# Patient Record
Sex: MALE | Race: ASIAN | NOT HISPANIC OR LATINO | Employment: FULL TIME | URBAN - METROPOLITAN AREA
[De-identification: names, ages, dates, MRNs, and addresses within clinical notes are randomized per-mention and may not be internally consistent; named-entity substitution may affect disease eponyms.]

---

## 2017-01-06 ENCOUNTER — TELEPHONE (OUTPATIENT)
Dept: FAMILY MEDICINE | Facility: CLINIC | Age: 27
End: 2017-01-06

## 2017-01-06 ENCOUNTER — RADIANT APPOINTMENT (OUTPATIENT)
Dept: GENERAL RADIOLOGY | Facility: CLINIC | Age: 27
End: 2017-01-06
Attending: PHYSICIAN ASSISTANT
Payer: COMMERCIAL

## 2017-01-06 ENCOUNTER — OFFICE VISIT (OUTPATIENT)
Dept: FAMILY MEDICINE | Facility: CLINIC | Age: 27
End: 2017-01-06
Payer: COMMERCIAL

## 2017-01-06 VITALS
WEIGHT: 167 LBS | HEART RATE: 106 BPM | TEMPERATURE: 97.4 F | OXYGEN SATURATION: 98 % | HEIGHT: 66 IN | SYSTOLIC BLOOD PRESSURE: 134 MMHG | DIASTOLIC BLOOD PRESSURE: 72 MMHG | BODY MASS INDEX: 26.84 KG/M2

## 2017-01-06 DIAGNOSIS — R05.9 COUGH: ICD-10-CM

## 2017-01-06 DIAGNOSIS — J45.41 MODERATE PERSISTENT ASTHMA WITH ACUTE EXACERBATION: Primary | ICD-10-CM

## 2017-01-06 DIAGNOSIS — F17.200 TOBACCO USE DISORDER: ICD-10-CM

## 2017-01-06 PROCEDURE — 99214 OFFICE O/P EST MOD 30 MIN: CPT | Performed by: PHYSICIAN ASSISTANT

## 2017-01-06 PROCEDURE — 71020 XR CHEST 2 VW: CPT

## 2017-01-06 PROCEDURE — 99406 BEHAV CHNG SMOKING 3-10 MIN: CPT | Performed by: PHYSICIAN ASSISTANT

## 2017-01-06 RX ORDER — PREDNISONE 20 MG/1
TABLET ORAL
Qty: 20 TABLET | Refills: 0 | Status: SHIPPED | OUTPATIENT
Start: 2017-01-06 | End: 2017-05-17

## 2017-01-06 RX ORDER — ALBUTEROL SULFATE 0.83 MG/ML
1 SOLUTION RESPIRATORY (INHALATION) EVERY 6 HOURS PRN
Qty: 60 VIAL | Refills: 1 | Status: SHIPPED | OUTPATIENT
Start: 2017-01-06 | End: 2018-05-03

## 2017-01-06 RX ORDER — ALBUTEROL SULFATE 90 UG/1
2 AEROSOL, METERED RESPIRATORY (INHALATION) EVERY 6 HOURS
Qty: 3 INHALER | Refills: 3 | Status: SHIPPED | OUTPATIENT
Start: 2017-01-06 | End: 2017-05-17

## 2017-01-06 NOTE — PATIENT INSTRUCTIONS
Based on your medical history and these are the current health maintenance or preventive care services that you are due for (some may have been done at this visit)  Health Maintenance Due   Topic Date Due     HPV IMMUNIZATION (1 of 3 - Male 3 Dose Series) 07/21/2003     INFLUENZA VACCINE (SYSTEM ASSIGNED)  09/01/2016     ASTHMA CONTROL TEST Q6 MOS (NO INBASKET)  02/03/2017       At [facility], we strive to deliver an exceptional experience to you, every time we see you.    If you receive a survey in the mail, please send us back your thoughts. We really do value your feedback.    Your care team's suggested websites for health information:  Www.[employer].org : Up to date and easily searchable information on multiple topics.  Www.medlineplus.gov : medication info, interactive tutorials, watch real surgeries online  Www.familydoctor.org : good info from the Academy of Family Physicians  Www.cdc.gov : public health info, travel advisories, epidemics (H1N1)  Www.aap.org : children's health info, normal development, vaccinations  Www.health.[facility].[place].us : [place] dept of health, public health issues in [place], N1N1    How to contact your care team:   Team [facility] [phone]         Pharmacy [phone]    Dr. Joseph, Tracey Estrada PA-C, Dr. Hernández, Kira GARRIDO CNP, Ariana Gonzalez PA-C, Dr. Blanton, and Ligia Gaona, CNP    Team RNs: Unique & Radha      Clinic hours  M-Th 7 am-7 pm   Fri 7 am-5 pm.   Urgent care M-F 11 am-9 pm,   Sat/Sun 9 am-5 pm.  Pharmacy M-Th 8 am-8 pm Fri 8 am-6 pm  Sat/Sun 9 am-5 pm.     All password changes, disabled accounts, or ID changes in [facility]/MyHealth will be done by our Access Services Department.    If you need help with your account or password, call: [phone]. Clinic staff no longer has the ability to change passwords.

## 2017-01-06 NOTE — PROGRESS NOTES
SUBJECTIVE:                                                    Jada Lion is a 26 year old male who presents to clinic today for the following health issues:      Hypertension Follow-up      Outpatient blood pressures are being checked at store.  Results are 144/90.    Low Salt Diet: no added salt     Asthma Follow-Up    Was ACT completed today?    Yes    ACT Total Scores 1/6/2017   ACT TOTAL SCORE -   ASTHMA ER VISITS -   ASTHMA HOSPITALIZATIONS -   ACT TOTAL SCORE (Goal Greater than or Equal to 20) 7   In the past 12 months, how many times did you visit the emergency room for your asthma without being admitted to the hospital? 2   In the past 12 months, how many times were you hospitalized overnight because of your asthma? 0         Amount of exercise or physical activity: 6-7 days/week for an average of 30-45 minutes    Problems taking medications regularly: No    Medication side effects: none  Diet: regular (no restrictions)    Was recently in [facility] ER for asthma exacerbation, not intubated, no CXR done.  They prescribed zpack and a 7 day prednisone burst.   He is still coughing and feels his breathing is tight despite being on day 6 of prednisone.  He ran out of his inhaler and has been doing 10 nebs/day.     He tried singulair at last appt for about a month and this did not help.  He states typically, his asthma feels well controlled with the advair, however this cold air and infection seems to have really flared it up.      He also c/o left side chest pain that comes and goes.  He is worried about his blood pressure, he checked it the other day and it was 144/92 mmHg.    BP Readings from Last 6 Encounters:   01/06/17 134/72   08/03/16 115/70   05/13/16 110/70   04/20/16 112/78   11/16/15 126/80   10/01/15 108/68           CHEST PAIN     Onset: 3 weeks     Description:   Location:  left side  Character: sharp  Radiation: na  Duration: 30 minutes and intermittent     Intensity: severe,  7/10    Progression of Symptoms:  worsening and intermittent    Accompanying Signs & Symptoms:  Shortness of breath: YES  Sweating: YES  Nausea/vomiting: no  Lightheadedness: YES  Palpitations: YES  Fever/Chills: YES- Chills  Cough: no  Heartburn: no    History:   Family history of heart disease not sure  Tobacco use: YES    Precipitating factors:   Worse with exertion: doesn't matter  Worse with deep breaths :  doesn't matter  Related to food: no    Alleviating factors:  na       Therapies Tried and outcome: na              Problem list and histories reviewed & adjusted, as indicated.  Additional history: as documented    Patient Active Problem List   Diagnosis     Moderate persistent asthma     Seasonal allergic rhinitis     Plantar fasciitis     Overweight (BMI 25.0-29.9)     CARDIOVASCULAR SCREENING; LDL GOAL LESS THAN 160     Moderate persistent asthma without complication     Hip pain, left     Tobacco use disorder     Past Surgical History   Procedure Laterality Date     Dental surgery  1-2015     wisdom teeth        Social History   Substance Use Topics     Smoking status: Current Every Day Smoker     Smokeless tobacco: Never Used     Alcohol Use: Yes     Family History   Problem Relation Age of Onset     DIABETES Mother      Hypertension Mother      Asthma Mother      Other Cancer Maternal Grandfather      Other Cancer Paternal Grandfather          Current Outpatient Prescriptions   Medication Sig Dispense Refill     predniSONE (DELTASONE) 20 MG tablet Take 3 tabs (60 mg) by mouth daily x 3 days, 2 tabs (40 mg) daily x 3 days, 1 tab (20 mg) daily x 3 days, then 1/2 tab (10 mg) x 3 days. 20 tablet 0     albuterol (ALBUTEROL) 108 (90 BASE) MCG/ACT Inhaler Inhale 2 puffs into the lungs every 6 hours 3 Inhaler 3     albuterol (2.5 MG/3ML) 0.083% neb solution Take 1 vial (2.5 mg) by nebulization every 6 hours as needed for shortness of breath / dyspnea or wheezing 60 vial 1     fluticasone-salmeterol  "(ADVAIR-HFA) 230-21 MCG/ACT inhaler Inhale 2 puffs into the lungs 2 times daily 1 Inhaler 3     order for DME 1 nebulizer machine with tubing 1 Device 0     [DISCONTINUED] albuterol (ALBUTEROL) 108 (90 BASE) MCG/ACT inhaler Inhale 2 puffs into the lungs every 6 hours 3 Inhaler 3     [DISCONTINUED] albuterol (2.5 MG/3ML) 0.083% nebulizer solution Take 1 vial (2.5 mg) by nebulization every 6 hours as needed for shortness of breath / dyspnea or wheezing 60 vial 3     BP Readings from Last 3 Encounters:   01/06/17 134/72   08/03/16 115/70   05/13/16 110/70    Wt Readings from Last 3 Encounters:   01/06/17 167 lb (75.751 kg)   08/03/16 164 lb (74.39 kg)   07/21/16 164 lb 12.8 oz (74.753 kg)                    ROS:  Constitutional, HEENT, cardiovascular, pulmonary, GI, , musculoskeletal, neuro, skin, endocrine and psych systems are negative, except as otherwise noted.    OBJECTIVE:                                                    /72 mmHg  Pulse 106  Temp(Src) 97.4  F (36.3  C) (Oral)  Ht 5' 6.14\" (1.68 m)  Wt 167 lb (75.751 kg)  BMI 26.84 kg/m2  SpO2 98%  Body mass index is 26.84 kg/(m^2).  GENERAL: healthy, alert and no distress  NECK: no adenopathy, no asymmetry, masses, or scars and thyroid normal to palpation  RESP: lungs clear to auscultation - no rales, rhonchi or wheezes  CV: regular rate and rhythm, normal S1 S2, no S3 or S4, no murmur, click or rub, no peripheral edema and peripheral pulses strong  ABDOMEN: soft, nontender, no hepatosplenomegaly, no masses and bowel sounds normal  MS: no gross musculoskeletal defects noted, no edema    Diagnostic Test Results:  XR CHEST 2 VW 1/6/2017 10:28 AM     HISTORY: Cough.     COMPARISON: None.                                                                       IMPRESSION: The lungs are clear. No focal pulmonary opacities. Heart  and mediastinum are unremarkable. No acute cardiopulmonary  abnormalities.     DOROTHY COX MD     ASSESSMENT/PLAN:            "                                             1. Moderate persistent asthma with acute exacerbation  Typically does well on advair and he is on the highest dose of this.  I suggest we leave this as is and extend his prednisone and taper off accordingly.  He has tried singulair in the past and didn't seem to do much, however it may be worthwhile to retrial that if symptoms persist.  Otherwise, I suggest f/u with pulmonary specialist if his asthma remains this poorly controlled.   Lungs were clear on exam today and oxygen levels stable, he did just use a neb before coming in.  Chest pain is likely due to chest wall pain and excessive coughing, I expect this to resolve as asthma improves.  I reassured him that today's blood pressure and others in the clinic have been fine, BP likely a bit higher due to acute asthma flare and steroid use.    - PULMONARY MEDICINE REFERRAL  - predniSONE (DELTASONE) 20 MG tablet; Take 3 tabs (60 mg) by mouth daily x 3 days, 2 tabs (40 mg) daily x 3 days, 1 tab (20 mg) daily x 3 days, then 1/2 tab (10 mg) x 3 days.  Dispense: 20 tablet; Refill: 0    2. Tobacco use disorder  He is still smoking 2 cig/day.  Discussed risks of smoking and strongly advised smoking cessation, especially given how severe his asthma is flaring currently.  Went over various options including patches, meds, and cessation programs available.  Reviewed general cessation measures, including behavioral modifications.   Approximately 9 minutes were spent counseling patient on smoking cessation.     Coding if there is a co-morbidity.  32383 (3-9 minutes) and 42647 (10+ minutes)           3. Cough  CXR clear.   - XR Chest 2 Views    FUTURE APPOINTMENTS:       - Follow-up over phone in 1 month to recheck ACT.  If ACT < 20, I suggest he f/u to discuss further (or get into pulmonary)    Ariana Gonzalez PA-C  [facility]

## 2017-01-06 NOTE — TELEPHONE ENCOUNTER
Pt was seen today for ASTHMA, score 7 on the ACT.   Call pt in 1 month to do a new ACT over the phone.  Pt left with ACT form.  An,CMA

## 2017-01-06 NOTE — NURSING NOTE
"Chief Complaint   Patient presents with     Asthma     Hypertension     Chest Pain       Initial /72 mmHg  Pulse 106  Temp(Src) 97.4  F (36.3  C) (Oral)  Ht 5' 6.14\" (1.68 m)  Wt 167 lb (75.751 kg)  BMI 26.84 kg/m2  SpO2 98% Estimated body mass index is 26.84 kg/(m^2) as calculated from the following:    Height as of this encounter: 5' 6.14\" (1.68 m).    Weight as of this encounter: 167 lb (75.751 kg).  BP completed using cuff size: regular  An,CMA      "

## 2017-01-07 ASSESSMENT — ASTHMA QUESTIONNAIRES: ACT_TOTALSCORE: 7

## 2017-02-06 NOTE — TELEPHONE ENCOUNTER
Called pt today and left a voice msg for pt call back.  PT needs ACT questionnaire  When pt calls back reach team Sonia = Kellie/Radha/Deanne.  If team is busy,write a msg in this telephone encounter and route to the team.  Estelle,CMA

## 2017-02-15 NOTE — TELEPHONE ENCOUNTER
Called pt today and left a voice msg for pt call back.   PT needs ACT questionnaire   When pt calls back reach team Sonia = Kellie/Radha/Deanne.   If team is busy,write a msg in this telephone encounter and route to the team.   3 outreaches done.  Route to the PCP  THELMA Mccabe

## 2017-02-16 NOTE — TELEPHONE ENCOUNTER
He's on [employer], can you please send a [employer] message as well and then close panel management call?  Thanks  Ariana

## 2017-03-13 ENCOUNTER — TELEPHONE (OUTPATIENT)
Dept: FAMILY MEDICINE | Facility: CLINIC | Age: 27
End: 2017-03-13

## 2017-03-15 NOTE — TELEPHONE ENCOUNTER
RN called pharmacy and they already filled medication for Pro-Air.  No further action is required at this time.      Jessica Le RN

## 2017-05-17 ENCOUNTER — OFFICE VISIT (OUTPATIENT)
Dept: FAMILY MEDICINE | Facility: CLINIC | Age: 27
End: 2017-05-17
Payer: COMMERCIAL

## 2017-05-17 VITALS
SYSTOLIC BLOOD PRESSURE: 110 MMHG | BODY MASS INDEX: 26.29 KG/M2 | DIASTOLIC BLOOD PRESSURE: 73 MMHG | TEMPERATURE: 97.9 F | HEIGHT: 66 IN | OXYGEN SATURATION: 100 % | HEART RATE: 57 BPM | RESPIRATION RATE: 15 BRPM | WEIGHT: 163.6 LBS

## 2017-05-17 DIAGNOSIS — S46.001A ROTATOR CUFF INJURY, RIGHT, INITIAL ENCOUNTER: Primary | ICD-10-CM

## 2017-05-17 DIAGNOSIS — F17.200 TOBACCO USE DISORDER: ICD-10-CM

## 2017-05-17 DIAGNOSIS — J45.40 MODERATE PERSISTENT ASTHMA WITHOUT COMPLICATION: ICD-10-CM

## 2017-05-17 PROCEDURE — 99214 OFFICE O/P EST MOD 30 MIN: CPT | Performed by: PHYSICIAN ASSISTANT

## 2017-05-17 RX ORDER — ALBUTEROL SULFATE 90 UG/1
2 AEROSOL, METERED RESPIRATORY (INHALATION) EVERY 6 HOURS
Qty: 2 INHALER | Refills: 3 | Status: SHIPPED | OUTPATIENT
Start: 2017-05-17 | End: 2018-03-05

## 2017-05-17 RX ORDER — DICLOFENAC SODIUM 75 MG/1
75 TABLET, DELAYED RELEASE ORAL 2 TIMES DAILY
Qty: 60 TABLET | Refills: 1 | Status: SHIPPED | OUTPATIENT
Start: 2017-05-17 | End: 2018-01-03

## 2017-05-17 ASSESSMENT — PAIN SCALES - GENERAL: PAINLEVEL: EXTREME PAIN (8)

## 2017-05-17 NOTE — MR AVS SNAPSHOT
After Visit Summary   5/17/2017    Jada Lion    MRN: [id number]           Patient Information     Date Of Birth          1990        Visit Information        Provider Department      5/17/2017 8:40 AM Ariana Gonzalez PA-C [facility]        Today's Diagnoses     Tobacco use disorder    -  1    Rotator cuff injury, right, initial encounter        Moderate persistent asthma without complication          Care Instructions    Diclofenac twice per day with food x 2 weeks and then as needed.   Heat or ice (whatever feels better).    ROM exercises  physical therapy.   No volleyball for 2-4 weeks (as tolerated).           Follow-ups after your visit        Additional Services     [facility] PT, HAND, AND CHIROPRACTIC REFERRAL       === This order will print in the [facility] Scheduling  Office ===    Physical therapy, hand therapy and chiropractic care are available through:    [facility]  [facility]    Call one easy number to schedule at any of the above locations:  [phone].    Your provider has referred you to Physical Therapy at [facility] or [facility]    Indication/Reason for Referral: Shoulder Pain  Onset of Illness:  2 weeks  Therapy Orders:  Evaluate and Treat  Special Programs:  None  Special Request:  None    Additional Comments for the therapist or chiropractor:      Please be aware that coverage of these services is subject to the terms and limitations of your health insurance plan.  Call member services at your health plan with any benefit or coverage questions.      Please bring the following to your appointment:    >>   Your personal calendar for scheduling future appointments  >>   Comfortable clothing                  Who to contact     If you have questions or need follow up information about today's clinic visit or your schedule please contact [facility] directly at [phone].  Normal or  "non-critical lab and imaging results will be communicated to you by MyChart, letter or phone within 4 business days after the clinic has received the results. If you do not hear from us within 7 days, please contact the clinic through [employer] or phone. If you have a critical or abnormal lab result, we will notify you by phone as soon as possible.  Submit refill requests through [employer] or call your pharmacy and they will forward the refill request to us. Please allow 3 business days for your refill to be completed.          Additional Information About Your Visit        [employer] Information     [employer] gives you secure access to your electronic health record. If you see a primary care provider, you can also send messages to your care team and make appointments. If you have questions, please call your primary care clinic.  If you do not have a primary care provider, please call [phone] and they will assist you.        Care EveryWhere ID     This is your Care EveryWhere ID. This could be used by other organizations to access your [facility] medical records  [id number]        Your Vitals Were     Pulse Temperature Respirations Height Pulse Oximetry BMI (Body Mass Index)    57 97.9  F (36.6  C) (Oral) 15 5' 6\" (1.676 m) 100% 26.41 kg/m2       Blood Pressure from Last 3 Encounters:   05/17/17 110/73   01/06/17 134/72   08/03/16 115/70    Weight from Last 3 Encounters:   05/17/17 163 lb 9.6 oz (74.2 kg)   01/06/17 167 lb (75.8 kg)   08/03/16 164 lb (74.4 kg)              We Performed the Following     [facility] PT, HAND, AND CHIROPRACTIC REFERRAL          Today's Medication Changes          These changes are accurate as of: 5/17/17  8:51 AM.  If you have any questions, ask your nurse or doctor.               Start taking these medicines.        Dose/Directions    diclofenac 75 MG EC tablet   Commonly known as:  VOLTAREN   Used for:  Rotator cuff injury, right, initial encounter        Dose:  75 mg   Take 1 tablet (75 mg) by " mouth 2 times daily X 2 weeks then as needed after that.  (with food)   Quantity:  60 tablet   Refills:  1       mometasone-formoterol 200-5 MCG/ACT oral inhaler   Commonly known as:  DULERA   Used for:  Moderate persistent asthma without complication        Dose:  2 puff   Inhale 2 puffs into the lungs 2 times daily   Quantity:  13 g   Refills:  0            Where to get your medicines      These medications were sent to [place] [address] [facility] [place] [facility], [place] - [address]  [address] [place] [address]     Phone:  [phone]     albuterol 108 (90 BASE) MCG/ACT Inhaler    diclofenac 75 MG EC tablet    mometasone-formoterol 200-5 MCG/ACT oral inhaler                Primary Care Provider Office Phone # Fax #    Ariana Gonzalez PA-C [phone]       [facility] [address] [place]  [facility] [address]        Thank you!     Thank you for choosing [facility]  for your care. Our goal is always to provide you with excellent care. Hearing back from our patients is one way we can continue to improve our services. Please take a few minutes to complete the written survey that you may receive in the mail after your visit with us. Thank you!             Your Updated Medication List - Protect others around you: Learn how to safely use, store and throw away your medicines at www.disposemymeds.org.          This list is accurate as of: 5/17/17  8:51 AM.  Always use your most recent med list.                   Brand Name Dispense Instructions for use    * albuterol (2.5 MG/3ML) 0.083% neb solution     60 vial    Take 1 vial (2.5 mg) by nebulization every 6 hours as needed for shortness of breath / dyspnea or wheezing       * albuterol 108 (90 BASE) MCG/ACT Inhaler    albuterol    2 Inhaler    Inhale 2 puffs into the lungs every 6 hours       diclofenac 75 MG EC tablet    VOLTAREN    60 tablet    Take 1 tablet (75 mg) by mouth 2 times daily X 2 weeks  then as needed after that.  (with food)       mometasone-formoterol 200-5 MCG/ACT oral inhaler    DULERA    13 g    Inhale 2 puffs into the lungs 2 times daily       order for DME     1 Device    1 nebulizer machine with tubing       predniSONE 20 MG tablet    DELTASONE    20 tablet    Take 3 tabs (60 mg) by mouth daily x 3 days, 2 tabs (40 mg) daily x 3 days, 1 tab (20 mg) daily x 3 days, then 1/2 tab (10 mg) x 3 days.       * Notice:  This list has 2 medication(s) that are the same as other medications prescribed for you. Read the directions carefully, and ask your doctor or other care provider to review them with you.

## 2017-05-17 NOTE — PATIENT INSTRUCTIONS
Diclofenac twice per day with food x 2 weeks and then as needed.   Heat or ice (whatever feels better).    ROM exercises  physical therapy.   No volleyball for 2-4 weeks (as tolerated).

## 2017-05-17 NOTE — NURSING NOTE
"Chief Complaint   Patient presents with     Pain     right shoulder times 1.5 mo       Initial /73 (BP Location: Left arm, Patient Position: Chair, Cuff Size: Adult Regular)  Pulse 57  Temp 97.9  F (36.6  C) (Oral)  Resp 15  Ht 5' 6\" (1.676 m)  Wt 163 lb 9.6 oz (74.2 kg)  SpO2 100%  BMI 26.41 kg/m2 Estimated body mass index is 26.41 kg/(m^2) as calculated from the following:    Height as of this encounter: 5' 6\" (1.676 m).    Weight as of this encounter: 163 lb 9.6 oz (74.2 kg).  Medication Reconciliation: complete   Christiana Swain CMA      "

## 2017-05-17 NOTE — PROGRESS NOTES
SUBJECTIVE:                                                    Jada Lion is a 26 year old male who presents to clinic today for the following health issues:      Joint Pain     Onset: 1.5 mo    Description:   Location: right shoulder  Character: Sharp    Intensity: moderate, severe    Progression of Symptoms: worse    Accompanying Signs & Symptoms:  Other symptoms: radiation of pain to arm , not sleeping well now    History:   Previous similar pain: no       Precipitating factors:   Trauma or overuse: no     Alleviating factors:  Improved by: nothing       Therapies Tried and outcome: otc pain meds (ibuprofen, naproxen, tylenol), no relief     Playing volleyball and it was sore after a game (no specific injury).  He plays volleyball daily, he is still playing however taking it easier than normal.    Pain is now constant and is rated 8/10 when sleeping, if he doesn't use it, not much pain.  Lifting his arm overhead worsens the pain.    Asthma;  When albuterol inhaler gets down around 30 clicks left, it is not as effective (doesn't feel like it is delivering the same medication).   Not on advair due to cost.   Uses albuterol daily.   Singulair tried in the past and did not help.   Advair disc has worked the best in the past.   Flovent tried in the past and not effective.            Problem list and histories reviewed & adjusted, as indicated.  Additional history: as documented    Patient Active Problem List   Diagnosis     Moderate persistent asthma     Seasonal allergic rhinitis     Plantar fasciitis     Overweight (BMI 25.0-29.9)     CARDIOVASCULAR SCREENING; LDL GOAL LESS THAN 160     Moderate persistent asthma without complication     Hip pain, left     Tobacco use disorder     Rotator cuff injury, right, initial encounter     Past Surgical History:   Procedure Laterality Date     DENTAL SURGERY  1-2015    wisdom teeth        Social History   Substance Use Topics     Smoking status: Current Every Day  "Smoker     Smokeless tobacco: Never Used     Alcohol use Yes     Family History   Problem Relation Age of Onset     DIABETES Mother      Hypertension Mother      Asthma Mother      Other Cancer Maternal Grandfather      Other Cancer Paternal Grandfather          Current Outpatient Prescriptions   Medication Sig Dispense Refill     mometasone-formoterol (DULERA) 200-5 MCG/ACT oral inhaler Inhale 2 puffs into the lungs 2 times daily 13 g 0     albuterol (ALBUTEROL) 108 (90 BASE) MCG/ACT Inhaler Inhale 2 puffs into the lungs every 6 hours 2 Inhaler 3     diclofenac (VOLTAREN) 75 MG EC tablet Take 1 tablet (75 mg) by mouth 2 times daily X 2 weeks then as needed after that.  (with food) 60 tablet 1     albuterol (2.5 MG/3ML) 0.083% neb solution Take 1 vial (2.5 mg) by nebulization every 6 hours as needed for shortness of breath / dyspnea or wheezing 60 vial 1     [DISCONTINUED] albuterol (ALBUTEROL) 108 (90 BASE) MCG/ACT Inhaler Inhale 2 puffs into the lungs every 6 hours 3 Inhaler 3     BP Readings from Last 3 Encounters:   05/17/17 110/73   01/06/17 134/72   08/03/16 115/70    Wt Readings from Last 3 Encounters:   05/17/17 163 lb 9.6 oz (74.2 kg)   01/06/17 167 lb (75.8 kg)   08/03/16 164 lb (74.4 kg)                    Reviewed and updated as needed this visit by clinical staff  Allergies  Meds       Reviewed and updated as needed this visit by Provider         ROS:  Constitutional, HEENT, cardiovascular, pulmonary, GI, , musculoskeletal, neuro, skin, endocrine and psych systems are negative, except as otherwise noted.    OBJECTIVE:                                                    /73 (BP Location: Left arm, Patient Position: Chair, Cuff Size: Adult Regular)  Pulse 57  Temp 97.9  F (36.6  C) (Oral)  Resp 15  Ht 5' 6\" (1.676 m)  Wt 163 lb 9.6 oz (74.2 kg)  SpO2 100%  BMI 26.41 kg/m2  Body mass index is 26.41 kg/(m^2).  GENERAL: healthy, alert and no distress  RESP: lungs clear to auscultation - no " rales, rhonchi or wheezes  CV: regular rate and rhythm, normal S1 S2, no S3 or S4, no murmur, click or rub, no peripheral edema and peripheral pulses strong    Inspection: No obvious deformity, asymmetry, muscle atrophy or abnormal motion noted.   Palpation:  No pain over AC or SC joint, acromion and subacromial space, pain noted over bicipital groove and greater/less tubercles, no pain over scapular spine and adjacent musculature, cervical spine.    External/Internal rotation strength:  Limited on right due to pain  Abduction/Adduction strength: full but pain noted with abduction past 90 degrees  Biceps/Triceps strength: full  Neck examination: normal  ROM/crepitus? normal  Capillary refills less than 2 seconds and radial pulses normal bilaterally.   Sensation intact bilateral fingers, hands and arms.  X-ray: not indicated.           ASSESSMENT/PLAN:                                                            1. Rotator cuff injury, right, initial encounter  Discussed the etiology and pathophysiology of this condition.  Recommend we start with conservative treatment, mainly daily stretching/physical therapy exercises, NSAID's and ice.  Discussed importance of maintaining joint ROM to prevent frozen shoulder.  If no improvement with physical therapy, I suggest he follow-up for a recheck and we may consider imaging at that time.     - diclofenac (VOLTAREN) 75 MG EC tablet; Take 1 tablet (75 mg) by mouth 2 times daily X 2 weeks then as needed after that.  (with food)  Dispense: 60 tablet; Refill: 1    2. Tobacco use disorder  He has been working on cutting back and is down to 2 cigs/day.  Continue with smoking cessation efforts.     3. Moderate persistent asthma without complication    - mometasone-formoterol (DULERA) 200-5 MCG/ACT oral inhaler; Inhale 2 puffs into the lungs 2 times daily  Dispense: 13 g; Refill: 0    Dulera not covered better than advair.  Will try qvar next.     - albuterol (ALBUTEROL) 108 (90 BASE)  MCG/ACT Inhaler; Inhale 2 puffs into the lungs every 6 hours  Dispense: 2 Inhaler; Refill: 3  - [facility]    FUTURE APPOINTMENTS:       - Follow-up visit in 1 month with ACT over phone.     Ariana Gonzalez PA-C  [facility]

## 2017-05-18 ENCOUNTER — TELEPHONE (OUTPATIENT)
Dept: FAMILY MEDICINE | Facility: CLINIC | Age: 27
End: 2017-05-18

## 2017-05-18 ASSESSMENT — ASTHMA QUESTIONNAIRES: ACT_TOTALSCORE: 19

## 2017-05-18 NOTE — TELEPHONE ENCOUNTER
Reason for Call:  Other returning call    Detailed comments: Was told to call and let the Dr know that Quvar is cheaper than Doljones chol go ahead and abram the Quvar.     Phone Number Patient can be reached at: Other phone number:  [phone]    Best Time: Any    Can we leave a detailed message on this number? YES    Call taken on 5/18/2017 at 9:18 AM by Lisa Burton

## 2017-07-12 ENCOUNTER — TELEPHONE (OUTPATIENT)
Dept: FAMILY MEDICINE | Facility: CLINIC | Age: 27
End: 2017-07-12

## 2017-07-12 NOTE — TELEPHONE ENCOUNTER
Ariana Gonzalez PA-C  [facility] Team Sonia Qi                     Please mail ACT and then call a few days later to check ACT.  If < 20, please schedule f/u office visit for asthma.   Ariana Gonzalez PA-C

## 2017-07-12 NOTE — LETTER
July 12, 2017      [address]ada Lion  [address] [place] [address]            Dear Jada,      At [facility] we care about your health and are committed to providing quality patient care. It has come to our attention that you are due for an Asthma Control Test.     This screening tool helps us to assess how well your asthma is controlled. Good asthma control leads to less asthma symptoms and greater health. If your asthma is not in good control (score less than 20) it is recommended you be seen by your provider for medication and lifestyle adjustments    We have enclosed an  Asthma Control Test. Please complete the enclosed asthma control test even if you are feeling well. You can mail it back to our clinic or we will call you and verbally complete it.    Thank you for your time and we hope this letter finds you well!      Sincerely,    Your Team at [facility]

## 2017-08-21 NOTE — TELEPHONE ENCOUNTER
Spoke with the pt and schedule follow up for Asthma this Wednesday 7:40 with Lisa.  THELMA Mccabe (AA)

## 2017-08-23 ENCOUNTER — OFFICE VISIT (OUTPATIENT)
Dept: FAMILY MEDICINE | Facility: CLINIC | Age: 27
End: 2017-08-23
Payer: COMMERCIAL

## 2017-08-23 VITALS
BODY MASS INDEX: 27.66 KG/M2 | OXYGEN SATURATION: 100 % | TEMPERATURE: 98.7 F | DIASTOLIC BLOOD PRESSURE: 73 MMHG | WEIGHT: 171.4 LBS | HEART RATE: 67 BPM | SYSTOLIC BLOOD PRESSURE: 129 MMHG

## 2017-08-23 DIAGNOSIS — F17.200 TOBACCO USE DISORDER: ICD-10-CM

## 2017-08-23 DIAGNOSIS — J45.40 MODERATE PERSISTENT ASTHMA WITHOUT COMPLICATION: Primary | ICD-10-CM

## 2017-08-23 DIAGNOSIS — H04.123 DRY EYES: ICD-10-CM

## 2017-08-23 DIAGNOSIS — K13.0 CHEILITIS: ICD-10-CM

## 2017-08-23 DIAGNOSIS — B07.8 COMMON WART: ICD-10-CM

## 2017-08-23 PROCEDURE — 17110 DESTRUCTION B9 LES UP TO 14: CPT | Performed by: PHYSICIAN ASSISTANT

## 2017-08-23 PROCEDURE — 99214 OFFICE O/P EST MOD 30 MIN: CPT | Mod: 25 | Performed by: PHYSICIAN ASSISTANT

## 2017-08-23 PROCEDURE — 99406 BEHAV CHNG SMOKING 3-10 MIN: CPT | Performed by: PHYSICIAN ASSISTANT

## 2017-08-23 NOTE — MR AVS SNAPSHOT
After Visit Summary   8/23/2017    Jada Lion    MRN: [id number]           Patient Information     Date Of Birth          1990        Visit Information        Provider Department      8/23/2017 7:40 AM Ariana Gonzalez PA-C [facility]        Today's Diagnoses     Moderate persistent asthma without complication    -  1    Tobacco use disorder        Dry eyes        Cheilitis        Common wart          Care Instructions    Based on your medical history and these are the current health maintenance or preventive care services that you are due for (some may have been done at this visit)  Health Maintenance Due   Topic Date Due     ASTHMA ACTION PLAN Q1 YR  07/21/2017         At [facility], we strive to deliver an exceptional experience to you, every time we see you.    If you receive a survey in the mail, please send us back your thoughts. We really do value your feedback.    Your care team's suggested websites for health information:  Www.[email or web address] : Up to date and easily searchable information on multiple topics.  Www.medlineplus.gov : medication info, interactive tutorials, watch real surgeries online  Www.familydoctor.org : good info from the Academy of Family Physicians  Www.cdc.gov : public health info, travel advisories, epidemics (H1N1)  Www.aap.org : children's health info, normal development, vaccinations  Www.health.[facility].[place].us : [place] dept of health, public health issues in [place], N1N1    How to contact your care team:   Team Sonia/Spirit [phone]         Pharmacy [phone]    Dr. Joseph, Tracey Estrada PA-C, Kira Silva APRN CNP, Ariana Gonzalez PA-C, Dr. Blanton, and RADHIKA Rodriguez CNP    Team RNs: Radha & Marizol      Clinic hours  M-Th 7 am-7 pm   Fri 7 am-5 pm.   Urgent care M-F 11 am-9 pm,   Sat/Sun 9 am-5 pm.  Pharmacy M-Th 8 am-8 pm Fri 8 am-6 pm  Sat/Sun 9 am-5 pm.     All password changes, disabled  accounts, or ID changes in XenoOne/MyHealth will be done by our Access Services Department.    If you need help with your account or password, call: [phone]. Clinic staff no longer has the ability to change passwords.       I suggest 1% hydrocortisone ointment to your lips twice per day x 10-12 days to help calm down the irritation.                 Follow-ups after your visit        Who to contact     If you have questions or need follow up information about today's clinic visit or your schedule please contact [facility] directly at [phone].  Normal or non-critical lab and imaging results will be communicated to you by [employer], letter or phone within 4 business days after the clinic has received the results. If you do not hear from us within 7 days, please contact the clinic through [employer] or phone. If you have a critical or abnormal lab result, we will notify you by phone as soon as possible.  Submit refill requests through [employer]One or call your pharmacy and they will forward the refill request to us. Please allow 3 business days for your refill to be completed.          Additional Information About Your Visit        XenoOne Information     [employer]One gives you secure access to your electronic health record. If you see a primary care provider, you can also send messages to your care team and make appointments. If you have questions, please call your primary care clinic.  If you do not have a primary care provider, please call [phone] and they will assist you.        Care EveryWhere ID     This is your Care EveryWhere ID. This could be used by other organizations to access your [facility] medical records  [id number]        Your Vitals Were     Pulse Temperature Pulse Oximetry BMI (Body Mass Index)          67 98.7  F (37.1  C) (Tympanic) 100% 27.66 kg/m2         Blood Pressure from Last 3 Encounters:   08/23/17 129/73   05/17/17 110/73   01/06/17 134/72    Weight from Last 3  Encounters:   08/23/17 171 lb 6.4 oz (77.7 kg)   05/17/17 163 lb 9.6 oz (74.2 kg)   01/06/17 167 lb (75.8 kg)              We Performed the Following     DESTRUCT BENIGN LESION, UP TO 14     SMOKING CESSATION COUNSELING 3-10 MIN          Today's Medication Changes          These changes are accurate as of: 8/23/17  8:58 AM.  If you have any questions, ask your nurse or doctor.               Start taking these medicines.        Dose/Directions    fluticasone-salmeterol 250-50 MCG/DOSE diskus inhaler   Commonly known as:  ADVAIR   Used for:  Moderate persistent asthma without complication   Started by:  Ariana Gonzalez PA-C        Dose:  1 puff   Inhale 1 puff into the lungs every 12 hours   Quantity:  60 Inhaler   Refills:  3       glycerin-hypromellose- 0.2-0.2-1 % Soln ophthalmic solution   Commonly known as:  ARTIFICIAL TEARS   Used for:  Dry eyes   Started by:  Ariana Gonzalez PA-C        Dose:  1 drop   Place 1 drop into both eyes 4 times daily   Quantity:  1 Bottle   Refills:  1       nicotine 10 MG Inhaler   Commonly known as:  NICOTROL   Used for:  Tobacco use disorder   Started by:  Ariana Gonzalez PA-C        Dose:  5 cartridge   Inhale 5 cartridge into the lungs daily as needed for smoking cessation   Quantity:  168 each   Refills:  1         Stop taking these medicines if you haven't already. Please contact your care team if you have questions.     beclomethasone 40 MCG/ACT Inhaler   Commonly known as:  QVAR   Stopped by:  Ariana Gonzalez PA-C                Where to get your medicines      These medications were sent to [place] [id number] [place] - [address]  [address] [address]     Phone:  [phone]     fluticasone-salmeterol 250-50 MCG/DOSE diskus inhaler    glycerin-hypromellose- 0.2-0.2-1 % Soln ophthalmic solution    nicotine 10 MG Inhaler                Primary Care Provider Office Phone # Fax #    Ariana  Cate Gonzalez PA-C [phone]       [address]  [facility] [address]        Equal Access to Services     RACHEL NICOLE : Hadii bebeto nance stephenieo Solebronali, waaxda luqadaha, qaybta kaalmada adeegstephda, clover rodriguez laBardanyell rees. So [facility] [phone].    ATENCIÓN: Si habla español, tiene a antunez disposición servicios gratuitos de asistencia lingüística. Llame al [phone].    We comply with applicable federal civil rights laws and [place] laws. We do not discriminate on the basis of race, color, national origin, age, disability sex, sexual orientation or gender identity.            Thank you!     Thank you for choosing [facility]  for your care. Our goal is always to provide you with excellent care. Hearing back from our patients is one way we can continue to improve our services. Please take a few minutes to complete the written survey that you may receive in the mail after your visit with us. Thank you!             Your Updated Medication List - Protect others around you: Learn how to safely use, store and throw away your medicines at www.disposemymeds.org.          This list is accurate as of: 8/23/17  8:58 AM.  Always use your most recent med list.                   Brand Name Dispense Instructions for use Diagnosis    * albuterol (2.5 MG/3ML) 0.083% neb solution     60 vial    Take 1 vial (2.5 mg) by nebulization every 6 hours as needed for shortness of breath / dyspnea or wheezing        * albuterol 108 (90 BASE) MCG/ACT Inhaler    albuterol    2 Inhaler    Inhale 2 puffs into the lungs every 6 hours    Moderate persistent asthma without complication       diclofenac 75 MG EC tablet    VOLTAREN    60 tablet    Take 1 tablet (75 mg) by mouth 2 times daily X 2 weeks then as needed after that.  (with food)    Rotator cuff injury, right, initial encounter       fluticasone-salmeterol 250-50 MCG/DOSE diskus inhaler    ADVAIR    60 Inhaler    Inhale 1 puff into the  lungs every 12 hours    Moderate persistent asthma without complication       glycerin-hypromellose- 0.2-0.2-1 % Soln ophthalmic solution    ARTIFICIAL TEARS    1 Bottle    Place 1 drop into both eyes 4 times daily    Dry eyes       nicotine 10 MG Inhaler    NICOTROL    168 each    Inhale 5 cartridge into the lungs daily as needed for smoking cessation    Tobacco use disorder       * Notice:  This list has 2 medication(s) that are the same as other medications prescribed for you. Read the directions carefully, and ask your doctor or other care provider to review them with you.

## 2017-08-23 NOTE — PATIENT INSTRUCTIONS
Based on your medical history and these are the current health maintenance or preventive care services that you are due for (some may have been done at this visit)  Health Maintenance Due   Topic Date Due     ASTHMA ACTION PLAN Q1 YR  07/21/2017         At [facility], we strive to deliver an exceptional experience to you, every time we see you.    If you receive a survey in the mail, please send us back your thoughts. We really do value your feedback.    Your care team's suggested websites for health information:  Www.[employer].org : Up to date and easily searchable information on multiple topics.  Www.medlineplus.gov : medication info, interactive tutorials, watch real surgeries online  Www.familydoctor.org : good info from the Academy of Family Physicians  Www.cdc.gov : public health info, travel advisories, epidemics (H1N1)  Www.aap.org : children's health info, normal development, vaccinations  Www.health.[facility].[place].us : [place] dept of health, public health issues in [place], N1N1    How to contact your care team:   Team Sonia/Spirit [phone]         Pharmacy [phone]    Dr. Joseph, Tracey Estrada PA-C, Dr. Hernández, Kira GARRIDO CNP, Ariana Gonzalez PA-C, Dr. Blanton, and RADHIKA Rodriguez CNP    Team RNs: Radha Fontana      Clinic hours  M-Th 7 am-7 pm   Fri 7 am-5 pm.   Urgent care M-F 11 am-9 pm,   Sat/Sun 9 am-5 pm.  Pharmacy M-Th 8 am-8 pm Fri 8 am-6 pm  Sat/Sun 9 am-5 pm.     All password changes, disabled accounts, or ID changes in I[employer]iction/MyHealth will be done by our Access Services Department.    If you need help with your account or password, call: [phone]. Clinic staff no longer has the ability to change passwords.       I suggest 1% hydrocortisone ointment to your lips twice per day x 10-12 days to help calm down the irritation.

## 2017-08-23 NOTE — NURSING NOTE
"Chief Complaint   Patient presents with     Asthma     Follow up     Wart     removal     Eye Problem     dry eye       Initial /73 (BP Location: Left arm, Patient Position: Sitting, Cuff Size: Adult Regular)  Pulse 67  Temp 98.7  F (37.1  C) (Tympanic)  Wt 171 lb 6.4 oz (77.7 kg)  SpO2 100%  BMI 27.66 kg/m2 Estimated body mass index is 27.66 kg/(m^2) as calculated from the following:    Height as of 5/17/17: 5' 6\" (1.676 m).    Weight as of this encounter: 171 lb 6.4 oz (77.7 kg).  Medication Reconciliation: complete   Alyx Worrell MA    "

## 2017-08-23 NOTE — PROGRESS NOTES
SUBJECTIVE:   Jada Lion is a 27 year old male who presents to clinic today for the following health issues:      Asthma Follow-Up    Was ACT completed today?  No      Respiratory symptoms:   Cough: No   Wheezing: Yes-     Shortness of breath: Yes-      Use of short- acting(rescue) inhaler: Yes    Taking controlled (daily) meds as prescribed: Yes    ER/UC visits or hospital admissions since last visit: none     Recent asthma triggers that patient is dealing with: Not sure but this morning it happened       Tried Qvar in May for about 2 months, didn't seem to help, he was still wheezing while on it.    Going through a lot of albuterol recently, especially while playing sports.  Uses albuterol daily.   Not on advair due to cost.   Singulair tried in the past and did not help.   Advair disc has worked the best in the past.   Flovent tried in the past and not effective.    Most recently, Qvar does not seem to have helped.        Dry eyes, has been waking with dry eyes.  Has tried clear eyes however this causes a burning sensation.  Has tried many OTC eye drops without relief.      Still with warts on hand, but improved.  Left thumb and right 2nd digit.      Tobacco use:  Smokes 2 cigs/day.  Smokes mainly while at work.      C/o irritation on his lips for the past 2 months.  He has tried using vaseline/carmex on a regular basis however this started after he did not use it one day and was licking his lips all day.  Since that time it has continued to be irritated and burning at times.       Problem list and histories reviewed & adjusted, as indicated.  Additional history: as documented    Patient Active Problem List   Diagnosis     Moderate persistent asthma     Seasonal allergic rhinitis     Plantar fasciitis     Overweight (BMI 25.0-29.9)     CARDIOVASCULAR SCREENING; LDL GOAL LESS THAN 160     Moderate persistent asthma without complication     Hip pain, left     Tobacco use disorder     Rotator cuff injury,  right, initial encounter     Cheilitis     Dry eyes     Past Surgical History:   Procedure Laterality Date     DENTAL SURGERY  1-2015    wisdom teeth        Social History   Substance Use Topics     Smoking status: Current Every Day Smoker     Smokeless tobacco: Never Used     Alcohol use Yes     Family History   Problem Relation Age of Onset     DIABETES Mother      Hypertension Mother      Asthma Mother      Other Cancer Maternal Grandfather      Other Cancer Paternal Grandfather          Current Outpatient Prescriptions   Medication Sig Dispense Refill     albuterol (ALBUTEROL) 108 (90 BASE) MCG/ACT Inhaler Inhale 2 puffs into the lungs every 6 hours 2 Inhaler 3     diclofenac (VOLTAREN) 75 MG EC tablet Take 1 tablet (75 mg) by mouth 2 times daily X 2 weeks then as needed after that.  (with food) 60 tablet 1     albuterol (2.5 MG/3ML) 0.083% neb solution Take 1 vial (2.5 mg) by nebulization every 6 hours as needed for shortness of breath / dyspnea or wheezing 60 vial 1     BP Readings from Last 3 Encounters:   08/23/17 129/73   05/17/17 110/73   01/06/17 134/72    Wt Readings from Last 3 Encounters:   08/23/17 171 lb 6.4 oz (77.7 kg)   05/17/17 163 lb 9.6 oz (74.2 kg)   01/06/17 167 lb (75.8 kg)                        Reviewed and updated as needed this visit by clinical staffTobacco  Allergies       Reviewed and updated as needed this visit by Provider         ROS:  Constitutional, HEENT, cardiovascular, pulmonary, gi and gu systems are negative, except as otherwise noted.      OBJECTIVE:   /73 (BP Location: Left arm, Patient Position: Sitting, Cuff Size: Adult Regular)  Pulse 67  Temp 98.7  F (37.1  C) (Tympanic)  Wt 171 lb 6.4 oz (77.7 kg)  SpO2 100%  BMI 27.66 kg/m2  Body mass index is 27.66 kg/(m^2).  GENERAL: healthy, alert and no distress  RESP: lungs clear to auscultation - no rales, rhonchi or wheezes  CV: regular rate and rhythm, normal S1 S2, no S3 or S4, no murmur, click or rub, no  peripheral edema and peripheral pulses strong  Lips:  A few areas of hyperpigmentation and flaking noted.  MS: no gross musculoskeletal defects noted, no edema  Wart:  Wart noted on left base of 1st digit and knuckle of right 2nd digit (2 warts).     Diagnostic Test Results:  none     ASSESSMENT/PLAN:         1. Moderate persistent asthma without complication  Uncontrolled.  Has tried many meds in the past, not currently on a controller medication.  Most recently, Qvar has not worked well.  Will restart advair and may need to start a PA for coverage or check for 's coupons.      Discussed importance of smoking cessation.   - fluticasone-salmeterol (ADVAIR) 250-50 MCG/DOSE diskus inhaler; Inhale 1 puff into the lungs every 12 hours  Dispense: 60 Inhaler; Refill: 3      (F17.200) Tobacco use disorder  Comment: Discussed risks of smoking and strongly advised smoking cessation.  Went over various options including patches, meds, and cessation programs available.  Reviewed general cessation measures, including behavioral modifications.   Approximately 5 minutes were spent counseling patient on smoking cessation.     Coding if there is a co-morbidity.  81746 (3-9 minutes) and 29024 (10+ minutes)         Plan: nicotine (NICOTROL) 10 MG Inhaler          Discussed importance of smoking cessation given his h/o asthma (especially with how uncontrolled it is)    (H04.123) Dry eyes  Comment: Will try artificial tears 4x/day  Plan: glycerin-hypromellose- (ARTIFICIAL         TEARS) 0.2-0.2-1 % SOLN ophthalmic solution            (K13.0) Cheilitis  Comment: I suggest OTC hydrocortisone ointment BID x 12 days.  F/u if no improvement (will recheck in 1 month).   Plan:     (B07.8) Common wart  Comment: 078.10U Wart  Plan: DESTRUC BENIGN LESION, UP TO 14 LESIONS  (86293)        Liquid nitrogen was applied for 10-12 seconds x 3 to the warts and the expected blistering or scabbing reaction explained. Do not pick at the  area. Patient reminded to expect hypopigmented scars from the procedure.  The etiology of common warts were discussed.  Jada Lion  will continue to use the over-the-counter medications such as Compound W under occlusion on a nightly  basis. Warm soapy water soaks and sanding also recommended.  The patient is to return every two weeks until all warts have resolved.      Plan: DESTRUCT BENIGN LESION, UP TO 14                FUTURE APPOINTMENTS:       - Follow-up visit in 1 month for an asthma recheck and will recheck lips at that time    Ariana Gonzalez PA-C  [facility]

## 2017-08-24 ASSESSMENT — ASTHMA QUESTIONNAIRES: ACT_TOTALSCORE: 8

## 2018-01-03 ENCOUNTER — OFFICE VISIT (OUTPATIENT)
Dept: FAMILY MEDICINE | Facility: CLINIC | Age: 28
End: 2018-01-03
Payer: COMMERCIAL

## 2018-01-03 VITALS
BODY MASS INDEX: 27.61 KG/M2 | HEIGHT: 66 IN | WEIGHT: 171.8 LBS | DIASTOLIC BLOOD PRESSURE: 64 MMHG | SYSTOLIC BLOOD PRESSURE: 102 MMHG | HEART RATE: 60 BPM

## 2018-01-03 DIAGNOSIS — Z72.820 SLEEP DEPRIVATION: ICD-10-CM

## 2018-01-03 DIAGNOSIS — J45.40 MODERATE PERSISTENT ASTHMA WITHOUT COMPLICATION: ICD-10-CM

## 2018-01-03 DIAGNOSIS — M22.2X2 PATELLOFEMORAL ARTHRALGIA OF BOTH KNEES: Primary | ICD-10-CM

## 2018-01-03 DIAGNOSIS — M22.2X1 PATELLOFEMORAL ARTHRALGIA OF BOTH KNEES: Primary | ICD-10-CM

## 2018-01-03 PROCEDURE — 99214 OFFICE O/P EST MOD 30 MIN: CPT | Performed by: PHYSICIAN ASSISTANT

## 2018-01-03 RX ORDER — DICLOFENAC SODIUM 75 MG/1
75 TABLET, DELAYED RELEASE ORAL 2 TIMES DAILY PRN
Qty: 60 TABLET | Refills: 1 | Status: SHIPPED | OUTPATIENT
Start: 2018-01-03 | End: 2018-03-05

## 2018-01-03 NOTE — LETTER
My Asthma Action Plan  Name: Jada Lion   YOB: 1990  Date: 1/3/2018   My doctor: Ariana Gonzalez PA-C   My clinic: [facility]        My Control Medicine: Fluticasone + salmeterol (Advair) -  Diskus 250/50 mcg 1 puff twice per day  My Rescue Medicine: Albuterol 90 mcg 1-2 puffs every 4-6 hours as needed     My Asthma Severity: moderate persistent  Avoid your asthma triggers: smoke, upper respiratory infections, pollens and animal dander              GREEN ZONE   Good Control    I feel good    No cough or wheeze    Can work, sleep and play without asthma symptoms       Take your asthma control medicine every day.     1. If exercise triggers your asthma, take your rescue medication    15 minutes before exercise or sports, and    During exercise if you have asthma symptoms  2. Spacer to use with inhaler: If you have a spacer, make sure to use it with your inhaler             YELLOW ZONE Getting Worse  I have ANY of these:    I do not feel good    Cough or wheeze    Chest feels tight    Wake up at night   1. Keep taking your Green Zone medications  2. Start taking your rescue medicine:    every 20 minutes for up to 1 hour. Then every 4 hours for 24-48 hours.  3. If you stay in the Yellow Zone for more than 12-24 hours, contact your doctor.  4. If you do not return to the Green Zone in 12-24 hours or you get worse, start taking your oral steroid medicine if prescribed by your provider.           RED ZONE Medical Alert - Get Help  I have ANY of these:    I feel awful    Medicine is not helping    Breathing getting harder    Trouble walking or talking    Nose opens wide to breathe       1. Take your rescue medicine NOW  2. If your provider has prescribed an oral steroid medicine, start taking it NOW  3. Call your doctor NOW  4. If you are still in the Red Zone after 20 minutes and you have not reached your doctor:    Take your rescue medicine again and    Call 911 or go to the  emergency room right away    See your regular doctor within 2 weeks of an Emergency Room or Urgent Care visit for follow-up treatment.        Electronically signed by: Ariana Gonzalez, January 3, 2018    Annual Reminders:  Meet with Asthma Educator,  Flu Shot in the Fall, consider Pneumonia Vaccination for patients with asthma (aged 19 and older).    Pharmacy: [facility] [address] [place], [place] - [address]                    Asthma Triggers  How To Control Things That Make Your Asthma Worse    Triggers are things that make your asthma worse.  Look at the list below to help you find your triggers and what you can do about them.  You can help prevent asthma flare-ups by staying away from your triggers.      Trigger                                                          What you can do   Cigarette Smoke  Tobacco smoke can make asthma worse. Do not allow smoking in your home, car or around you.  Be sure no one smokes at a child s day care or school.  If you smoke, ask your health care provider for ways to help you quit.  Ask family members to quit too.  Ask your health care provider for a referral to Quit Plan to help you quit smoking, or call 9-734-458-PLAN.     Colds, Flu, Bronchitis  These are common triggers of asthma. Wash your hands often.  Don t touch your eyes, nose or mouth.  Get a flu shot every year.     Dust Mites  These are tiny bugs that live in cloth or carpet. They are too small to see. Wash sheets and blankets in hot water every week.   Encase pillows and mattress in dust mite proof covers.  Avoid having carpet if you can. If you have carpet, vacuum weekly.   Use a dust mask and HEPA vacuum.   Pollen and Outdoor Mold  Some people are allergic to trees, grass, or weed pollen, or molds. Try to keep your windows closed.  Limit time out doors when pollen count is high.   Ask you health care provider about taking medicine during allergy season.     Animal Dander  Some people are  allergic to skin flakes, urine or saliva from pets with fur or feathers. Keep pets with fur or feathers out of your home.    If you can t keep the pet outdoors, then keep the pet out of your bedroom.  Keep the bedroom door closed.  Keep pets off cloth furniture and away from stuffed toys.     Mice, Rats, and Cockroaches  Some people are allergic to the waste from these pests.   Cover food and garbage.  Clean up spills and food crumbs.  Store grease in the refrigerator.   Keep food out of the bedroom.   Indoor Mold  This can be a trigger if your home has high moisture. Fix leaking faucets, pipes, or other sources of water.   Clean moldy surfaces.  Dehumidify basement if it is damp and smelly.   Smoke, Strong Odors, and Sprays  These can reduce air quality. Stay away from strong odors and sprays, such as perfume, powder, hair spray, paints, smoke incense, paint, cleaning products, candles and new carpet.   Exercise or Sports  Some people with asthma have this trigger. Be active!  Ask your doctor about taking medicine before sports or exercise to prevent symptoms.    Warm up for 5-10 minutes before and after sports or exercise.     Other Triggers of Asthma  Cold air:  Cover your nose and mouth with a scarf.  Sometimes laughing or crying can be a trigger.  Some medicines and food can trigger asthma.

## 2018-01-03 NOTE — MR AVS SNAPSHOT
After Visit Summary   1/3/2018    Jada Lion    MRN: [id number]           Patient Information     Date Of Birth          1990        Visit Information        Provider Department      1/3/2018 8:00 AM Ariana Gonzalez PA-C [facility]        Today's Diagnoses     Patellofemoral arthralgia of both knees    -  1      Care Instructions    You need to get more sleep at night, goal is 6 hours per night.    For your knees, make sure you rest them as your pain is caused by overuse.  You can try ice, elevation and a compression sleeve to help with the pain          Follow-ups after your visit        Who to contact     Normal or non-critical lab and imaging results will be communicated to you by DraftMixhart, letter or phone within 4 business days after the clinic has received the results. If you do not hear from us within 7 days, please contact the clinic through DraftMixhart or phone. If you have a critical or abnormal lab result, we will notify you by phone as soon as possible.  Submit refill requests through MadeClose or call your pharmacy and they will forward the refill request to us. Please allow 3 business days for your refill to be completed.          If you need to speak with a  for additional information , please call: [phone]           Additional Information About Your Visit        MadeClose Information     MadeClose gives you secure access to your electronic health record. If you see a primary care provider, you can also send messages to your care team and make appointments. If you have questions, please call your primary care clinic.  If you do not have a primary care provider, please call [phone] and they will assist you.        Care EveryWhere ID     This is your Care EveryWhere ID. This could be used by other organizations to access your [place] medical records  [id number]        Your Vitals Were     Pulse Height BMI (Body Mass Index)             60 5'  "6\" (1.676 m) 27.73 kg/m2          Blood Pressure from Last 3 Encounters:   01/03/18 102/64   08/23/17 129/73   05/17/17 110/73    Weight from Last 3 Encounters:   01/03/18 171 lb 12.8 oz (77.9 kg)   08/23/17 171 lb 6.4 oz (77.7 kg)   05/17/17 163 lb 9.6 oz (74.2 kg)              Today, you had the following     No orders found for display         Today's Medication Changes          These changes are accurate as of: 1/3/18  8:35 AM.  If you have any questions, ask your nurse or doctor.               Start taking these medicines.        Dose/Directions    diclofenac 75 MG EC tablet   Commonly known as:  VOLTAREN   Used for:  Patellofemoral arthralgia of both knees   Started by:  rAiana Gonzalez PA-C        Dose:  75 mg   Take 1 tablet (75 mg) by mouth 2 times daily as needed for moderate pain   Quantity:  60 tablet   Refills:  1            Where to get your medicines      These medications were sent to [id number] - [place], [place] - [address]  [address] [place] [facility] [address]     Phone:  [phone]     diclofenac 75 MG EC tablet                Primary Care Provider Office Phone # Fax #    Ariana Gonzalez PA-C [phone] [address] [place] [address]        Equal Access to Services     [facility] COREY AH: Hadii bebeto nance hadasho Soomaali, waaxda luqadaha, qaybta kaalmada adeegyada, clover rees. So [facility] [phone].    ATENCIÓN: Si habla jaycobañol, tiene a antunez disposición servicios gratuitos de asistencia lingüística. Llame al [phone].    We comply with applicable federal civil rights laws and [place] laws. We do not discriminate on the basis of race, color, national origin, age, disability, sex, sexual orientation, or gender identity.            Thank you!     Thank you for choosing [facility]  for your care. Our goal is always to provide you with excellent care. Hearing back from our patients is " one way we can continue to improve our services. Please take a few minutes to complete the written survey that you may receive in the mail after your visit with us. Thank you!             Your Updated Medication List - Protect others around you: Learn how to safely use, store and throw away your medicines at www.disposemymeds.org.          This list is accurate as of: 1/3/18  8:35 AM.  Always use your most recent med list.                   Brand Name Dispense Instructions for use Diagnosis    * albuterol (2.5 MG/3ML) 0.083% neb solution     60 vial    Take 1 vial (2.5 mg) by nebulization every 6 hours as needed for shortness of breath / dyspnea or wheezing        * albuterol 108 (90 BASE) MCG/ACT Inhaler    PROAIR HFA    2 Inhaler    Inhale 2 puffs into the lungs every 6 hours    Moderate persistent asthma without complication       diclofenac 75 MG EC tablet    VOLTAREN    60 tablet    Take 1 tablet (75 mg) by mouth 2 times daily as needed for moderate pain    Patellofemoral arthralgia of both knees       fluticasone-salmeterol 250-50 MCG/DOSE diskus inhaler    ADVAIR    60 Inhaler    Inhale 1 puff into the lungs every 12 hours    Moderate persistent asthma without complication       glycerin-hypromellose- 0.2-0.2-1 % Soln ophthalmic solution    ARTIFICIAL TEARS    1 Bottle    Place 1 drop into both eyes 4 times daily    Dry eyes       nicotine 10 MG Inhaler    NICOTROL    168 each    Inhale 5 cartridge into the lungs daily as needed for smoking cessation    Tobacco use disorder       * Notice:  This list has 2 medication(s) that are the same as other medications prescribed for you. Read the directions carefully, and ask your doctor or other care provider to review them with you.

## 2018-01-03 NOTE — LETTER
[facility]  [address] [facility] [address]  Phone: [phone]    January 3, 2018        Jada Lion  [address] [place] [facility] [address]          To whom it may concern:    RE: Jada Lion    Patient was seen and treated today at our clinic and missed work.    Please contact me for questions or concerns.      Sincerely,        Ariana Gonzalze PA-C

## 2018-01-03 NOTE — PATIENT INSTRUCTIONS
You need to get more sleep at night, goal is 6 hours per night.    For your knees, make sure you rest them as your pain is caused by overuse.  You can try ice, elevation and a compression sleeve to help with the pain

## 2018-01-03 NOTE — PROGRESS NOTES
"  SUBJECTIVE:   Jaad Lion is a 27 year old male who presents to clinic today for the following health issues:    Joint Pain    Onset:pain in both knees for 1 week. Right knee is very bothersome today.     Description:   Location: left knee and right knee  Character: Sharp, Dull ache and Stabbing, burning, \"feels like bones are touching\"    Intensity: moderate    Progression of Symptoms: worse    Accompanying Signs & Symptoms:  Other symptoms: radiation of pain to hamstring     History:   Previous similar pain: no       Precipitating factors:   Trauma or overuse: YES- maybe from playing volleyball     Alleviating factors:  Improved by: tylenol and ibuprofen without relief    Right knee feels unsteady at times, left knee just pain.     Therapies Tried and outcome: ice for 1-2 days - made it worse, tylenol and ibuprofen     No pain while playing volleyball (plays nearly everyday).  Gets 3-4 hours of sleep at night.  Drinks an energy drink once per day.  Works and goes to school and has a daughter.  His schedule is packed.     Currently in school at [employer], needs a form filled out stating he is current on immunizations (does not actually need to be signed by provider, just needs immunization history)           Asthma Follow-Up    Was ACT completed today?    Yes    ACT Total Scores 1/3/2018   ACT TOTAL SCORE -   ASTHMA ER VISITS -   ASTHMA HOSPITALIZATIONS -   ACT TOTAL SCORE (Goal Greater than or Equal to 20) 13   In the past 12 months, how many times did you visit the emergency room for your asthma without being admitted to the hospital? 0   In the past 12 months, how many times were you hospitalized overnight because of your asthma? 0       Recent asthma triggers that patient is dealing with: None              Problem list and histories reviewed & adjusted, as indicated.  Additional history: as documented    Patient Active Problem List   Diagnosis     Moderate persistent asthma     Seasonal " allergic rhinitis     Plantar fasciitis     Overweight (BMI 25.0-29.9)     CARDIOVASCULAR SCREENING; LDL GOAL LESS THAN 160     Moderate persistent asthma without complication     Hip pain, left     Tobacco use disorder     Rotator cuff injury, right, initial encounter     Cheilitis     Dry eyes     Past Surgical History:   Procedure Laterality Date     DENTAL SURGERY  1-2015    wisdom teeth        Social History   Substance Use Topics     Smoking status: Current Every Day Smoker     Smokeless tobacco: Never Used     Alcohol use Yes     Family History   Problem Relation Age of Onset     DIABETES Mother      Hypertension Mother      Asthma Mother      Other Cancer Maternal Grandfather      Other Cancer Paternal Grandfather          Current Outpatient Prescriptions   Medication Sig Dispense Refill     diclofenac (VOLTAREN) 75 MG EC tablet Take 1 tablet (75 mg) by mouth 2 times daily as needed for moderate pain 60 tablet 1     fluticasone-salmeterol (ADVAIR) 250-50 MCG/DOSE diskus inhaler Inhale 1 puff into the lungs every 12 hours 60 Inhaler 3     nicotine (NICOTROL) 10 MG Inhaler Inhale 5 cartridge into the lungs daily as needed for smoking cessation 168 each 1     glycerin-hypromellose- (ARTIFICIAL TEARS) 0.2-0.2-1 % SOLN ophthalmic solution Place 1 drop into both eyes 4 times daily 1 Bottle 1     albuterol (ALBUTEROL) 108 (90 BASE) MCG/ACT Inhaler Inhale 2 puffs into the lungs every 6 hours 2 Inhaler 3     albuterol (2.5 MG/3ML) 0.083% neb solution Take 1 vial (2.5 mg) by nebulization every 6 hours as needed for shortness of breath / dyspnea or wheezing 60 vial 1     [DISCONTINUED] diclofenac (VOLTAREN) 75 MG EC tablet Take 1 tablet (75 mg) by mouth 2 times daily X 2 weeks then as needed after that.  (with food) 60 tablet 1     BP Readings from Last 3 Encounters:   01/03/18 102/64   08/23/17 129/73   05/17/17 110/73    Wt Readings from Last 3 Encounters:   01/03/18 171 lb 12.8 oz (77.9 kg)   08/23/17 171 lb  "6.4 oz (77.7 kg)   05/17/17 163 lb 9.6 oz (74.2 kg)                        Reviewed and updated as needed this visit by clinical staff     Reviewed and updated as needed this visit by Provider         ROS:  Constitutional, HEENT, cardiovascular, pulmonary, gi and gu systems are negative, except as otherwise noted.      OBJECTIVE:   /64  Pulse 60  Ht 5' 6\" (1.676 m)  Wt 171 lb 12.8 oz (77.9 kg)  BMI 27.73 kg/m2  Body mass index is 27.73 kg/(m^2).  GENERAL: healthy, alert and no distress  MS: no gross musculoskeletal defects noted, no edema  SKIN: no suspicious lesions or rashes  NEURO: Normal strength and tone, mentation intact and speech normal    GAIT: NORMAL  Dorsalis Pedis pulses intact bilaterally.  MUSCULOSKELETAL:  Bilateral KNEEs   Inspection: AP/lateral alignment normal, No effusion  Tender: prepatellar bursa  Non-tender: lateral joint line, medial joint line, popliteal region  Active Range of Motion: full flexion, full extension  Strength: full  Special tests: normal Valgus stress test, negative Lachman's test  Duck walk: able to perform.     No warmth noted over bilateral knees.             Diagnostic Test Results:  none     ASSESSMENT/PLAN:       1. Patellofemoral arthralgia of both knees  Patellofemoral Pain Syndrome    The patient was advised to apply ice on knee for 20-30 minutes every 3-4 hours for first 2-3 days or until pain goes away.  Elevate knee by placing a pillow underneath  Start anti-inflammatory medication, see epic for orders.  Do rehabilitation exercises, Patient education materials given during examination today, patellofemoral pain syndrome.    Physical therapy/imaging if symptoms not improving    Patient was instructed to f/u if symptoms worsen or fail to improve as anticipated.          - diclofenac (VOLTAREN) 75 MG EC tablet; Take 1 tablet (75 mg) by mouth 2 times daily as needed for moderate pain  Dispense: 60 tablet; Refill: 1    2. Moderate persistent asthma without " complication  AAP done today.  No change in treatment at this time, he feels his breathing is fine.     3. Sleep deprivation  Discussed the importance of adequate sleep and need for rest (especially with his knee pains).  Currently getting 3-4 hours of sleep at night,  I suggest an initial goal of 6 hours per night.     Immunization history provided for Kristian    FUTURE APPOINTMENTS:       - Follow-up visit if symptoms worsen or fail to improve as anticipated.     Ariana Gonzalez PA-C  [facility]

## 2018-01-04 ASSESSMENT — ASTHMA QUESTIONNAIRES: ACT_TOTALSCORE: 13

## 2018-01-18 DIAGNOSIS — J45.40 MODERATE PERSISTENT ASTHMA WITHOUT COMPLICATION: ICD-10-CM

## 2018-01-19 NOTE — TELEPHONE ENCOUNTER
"Requested Prescriptions   Pending Prescriptions Disp Refills     PROAIR  (90 BASE) MCG/ACT inhaler [Pharmacy Med Name: PROAIR HFA 90 MCG INHALER] 17 Inhaler 3    Last Written Prescription Date:  05/07/2017 #2 x 3  Last filled 01/04/2018  Last Office Visit with [facility], [facility] or [facility] prescribing provider:  01/03/2018 RANGEL Gonzalez   Future Office Visit:  none    Sig: INHALE 2 PUFFS BY MOUTH INTO THE LUNGS EVERY 6 HOURS    Asthma Maintenance Inhalers - Anticholinergics Failed    1/18/2018  6:02 PM       Failed - Asthma control test score is 20 or greater in last 6 months    Please review ACT score.   ACT Total Scores 5/17/2017 8/23/2017 1/3/2018   ACT TOTAL SCORE - - -   ASTHMA ER VISITS - - -   ASTHMA HOSPITALIZATIONS - - -   ACT TOTAL SCORE (Goal Greater than or Equal to 20) 19 8 13   In the past 12 months, how many times did you visit the emergency room for your asthma without being admitted to the hospital? 2 1 0   In the past 12 months, how many times were you hospitalized overnight because of your asthma? 0 0 0              Passed - Patient is age 12 years or older       Passed - Recent (6 mo) or future visit with authorizing provider's specialty    Patient had office visit in the last 6 months or has a visit in the next 30 days with authorizing provider.  See \"Patient Info\" tab in inbasket, or \"Choose Columns\" in Meds & Orders section of the refill encounter.              "

## 2018-01-22 ENCOUNTER — OFFICE VISIT (OUTPATIENT)
Dept: FAMILY MEDICINE | Facility: CLINIC | Age: 28
End: 2018-01-22
Payer: COMMERCIAL

## 2018-01-22 VITALS
RESPIRATION RATE: 18 BRPM | HEIGHT: 66 IN | BODY MASS INDEX: 27.64 KG/M2 | OXYGEN SATURATION: 97 % | WEIGHT: 172 LBS | HEART RATE: 87 BPM | DIASTOLIC BLOOD PRESSURE: 68 MMHG | SYSTOLIC BLOOD PRESSURE: 126 MMHG | TEMPERATURE: 98.6 F

## 2018-01-22 DIAGNOSIS — J45.40 MODERATE PERSISTENT ASTHMA, UNSPECIFIED WHETHER COMPLICATED: ICD-10-CM

## 2018-01-22 DIAGNOSIS — J06.9 UPPER RESPIRATORY TRACT INFECTION, UNSPECIFIED TYPE: Primary | ICD-10-CM

## 2018-01-22 DIAGNOSIS — J10.1 INFLUENZA A: ICD-10-CM

## 2018-01-22 LAB
FLUAV+FLUBV AG SPEC QL: NEGATIVE
FLUAV+FLUBV AG SPEC QL: NEGATIVE
SPECIMEN SOURCE: NORMAL

## 2018-01-22 PROCEDURE — 99213 OFFICE O/P EST LOW 20 MIN: CPT | Performed by: PHYSICIAN ASSISTANT

## 2018-01-22 PROCEDURE — 87804 INFLUENZA ASSAY W/OPTIC: CPT | Performed by: PHYSICIAN ASSISTANT

## 2018-01-22 RX ORDER — OSELTAMIVIR PHOSPHATE 75 MG/1
75 CAPSULE ORAL 2 TIMES DAILY
Qty: 10 CAPSULE | Refills: 0 | Status: SHIPPED | OUTPATIENT
Start: 2018-01-22 | End: 2018-03-05

## 2018-01-22 RX ORDER — ALBUTEROL SULFATE 90 UG/1
2 AEROSOL, METERED RESPIRATORY (INHALATION) EVERY 4 HOURS PRN
Qty: 1 INHALER | Refills: 0 | Status: SHIPPED | OUTPATIENT
Start: 2018-01-22 | End: 2018-05-03

## 2018-01-22 ASSESSMENT — PAIN SCALES - GENERAL: PAINLEVEL: MODERATE PAIN (4)

## 2018-01-22 NOTE — MR AVS SNAPSHOT
After Visit Summary   1/22/2018    Jada Lion    MRN: [id number]           Patient Information     Date Of Birth          1990        Visit Information        Provider Department      1/22/2018 3:00 PM Nate Feng PA [facility]        Today's Diagnoses     Upper respiratory tract infection, unspecified type    -  1    Influenza A          Care Instructions      Influenza (Adult)  Influenza, also called the flu, is a viral illness that affects the air passages of the lungs. It differs from the common cold. It is highly contagious. It may be spread through the air by coughing and sneezing or by direct contact (touching the sick person and then touching your own eyes, nose or mouth).  Illness starts 1-3 days after exposure and lasts for 1-2 weeks. Antibiotics are usually not needed unless a complication appears (ear or sinus infection or pneumonia).  Symptoms may be mild or severe and can include extreme tiredness (wanting to stay in bed all day), chills, fevers, muscle aching, soreness with eye movement, headache, and a dry, hacking cough.  Home Care:  Avoid exposure to cigarette smoke (yours or others ).  Tylenol or ibuprofen (Advil) will help fever, muscle aching, and headache. To avoid risk of liver injury, aspirin should not be used in children and teenagers under 18 with this illness.  Nausea and loss of appetite are common. A light diet is recommended. Avoid dehydration by drinking 6-8 glasses of fluids per day (water, sport drinks like Gatorade, soft drinks without caffeine, juices, tea, soup, etc.). Extra fluids will also help loosen secretions in the nose and lungs.  Over-the-counter cold medicines will not shorten the duration of the illness but may be helpful for the following symptoms: cough (Robitussin DM); sore throat (Chloraseptic lozenges or spray); nasal and sinus congestion (Actifed or Sudafed). [NOTE: Do not use decongestants if you have  high blood pressure.]  Stay home until your fever has been gone for at least 24 hours (without the use of fever-reducing medications such as ibuprofen).  Follow Up  with your doctor or as directed by our staff if you are not improving over the next week.  Note: If you are age 65 or older, or if you have chronic asthma or COPD, we recommend a pneumococcal vaccination every five years. All adults should receive a yearly influenza vaccination every autumn. Ask your doctor about this.  Get Prompt Medical Attention  if any of the following occur:  Cough with lots of colored sputum (mucus) or blood in your sputum  Chest pain, shortness of breath, wheezing, or difficulty breathing  Severe headache, face, neck or ear pain  New rash  Fever of 100.4 F (38 C) oral or higher, not better with fever medication  Confusion, behavior change or seizure  Severe weakness or dizziness    0341-3762 [place] [address]. All rights reserved. This information is not intended as a substitute for professional medical care. Always follow your healthcare professional's instructions.                Follow-ups after your visit        Follow-up notes from your care team     Return if symptoms worsen or fail to improve.      Who to contact     If you have questions or need follow up information about today's clinic visit or your schedule please contact [facility] directly at [phone].  Normal or non-critical lab and imaging results will be communicated to you by MyChart, letter or phone within 4 business days after the clinic has received the results. If you do not hear from us within 7 days, please contact the clinic through MyChart or phone. If you have a critical or abnormal lab result, we will notify you by phone as soon as possible.  Submit refill requests through [employer] or call your pharmacy and they will forward the refill request to us. Please allow 3 business days for your  "refill to be completed.          Additional Information About Your Visit        [employer] Information     [employer] gives you secure access to your electronic health record. If you see a primary care provider, you can also send messages to your care team and make appointments. If you have questions, please call your primary care clinic.  If you do not have a primary care provider, please call [phone] and they will assist you.        Care EveryWhere ID     This is your Care EveryWhere ID. This could be used by other organizations to access your [facility] medical records  [id number]        Your Vitals Were     Pulse Temperature Respirations Height Pulse Oximetry BMI (Body Mass Index)    87 98.6  F (37  C) (Oral) 18 5' 5.75\" (1.67 m) 97% 27.97 kg/m2       Blood Pressure from Last 3 Encounters:   01/22/18 126/68   01/03/18 102/64   08/23/17 129/73    Weight from Last 3 Encounters:   01/22/18 172 lb (78 kg)   01/03/18 171 lb 12.8 oz (77.9 kg)   08/23/17 171 lb 6.4 oz (77.7 kg)              We Performed the Following     Influenza A/B antigen          Today's Medication Changes          These changes are accurate as of: 1/22/18  4:08 PM.  If you have any questions, ask your nurse or doctor.               Start taking these medicines.        Dose/Directions    oseltamivir 75 MG capsule   Commonly known as:  TAMIFLU   Used for:  Influenza A   Started by:  Nate Feng PA        Dose:  75 mg   Take 1 capsule (75 mg) by mouth 2 times daily   Quantity:  10 capsule   Refills:  0            Where to get your medicines      These medications were sent to [id number] [facility] [place] - [address]  [address] [place] [address]     Phone:  [phone]     oseltamivir 75 MG capsule                Primary Care Provider Office Phone # Fax #    Ariana Gonzalez PA-C [phone] [address] [place] [address]        Equal Access to Services     " RACHEL NICOLE : Hadii aad ku gustabo Solebronali, waaxda luqadaha, qaybta kaalmada adeegdarron, clover lindain hayaashukri curzish rodriguez dougie . So [facility] [phone].    ATENCIÓN: Si habla desire, tiene a antunez disposición servicios gratuitos de asistencia lingüística. Llame al [phone].    We comply with applicable federal civil rights laws and [place] laws. We do not discriminate on the basis of race, color, national origin, age, disability, sex, sexual orientation, or gender identity.            Thank you!     Thank you for choosing [facility]  for your care. Our goal is always to provide you with excellent care. Hearing back from our patients is one way we can continue to improve our services. Please take a few minutes to complete the written survey that you may receive in the mail after your visit with us. Thank you!             Your Updated Medication List - Protect others around you: Learn how to safely use, store and throw away your medicines at www.disposemymeds.org.          This list is accurate as of: 1/22/18  4:08 PM.  Always use your most recent med list.                   Brand Name Dispense Instructions for use Diagnosis    * albuterol (2.5 MG/3ML) 0.083% neb solution     60 vial    Take 1 vial (2.5 mg) by nebulization every 6 hours as needed for shortness of breath / dyspnea or wheezing        * albuterol 108 (90 BASE) MCG/ACT Inhaler    PROAIR HFA    2 Inhaler    Inhale 2 puffs into the lungs every 6 hours    Moderate persistent asthma without complication       diclofenac 75 MG EC tablet    VOLTAREN    60 tablet    Take 1 tablet (75 mg) by mouth 2 times daily as needed for moderate pain    Patellofemoral arthralgia of both knees       fluticasone-salmeterol 250-50 MCG/DOSE diskus inhaler    ADVAIR    60 Inhaler    Inhale 1 puff into the lungs every 12 hours    Moderate persistent asthma without complication       glycerin-hypromellose- 0.2-0.2-1 % Soln ophthalmic solution     ARTIFICIAL TEARS    1 Bottle    Place 1 drop into both eyes 4 times daily    Dry eyes       nicotine 10 MG Inhaler    NICOTROL    168 each    Inhale 5 cartridge into the lungs daily as needed for smoking cessation    Tobacco use disorder       oseltamivir 75 MG capsule    TAMIFLU    10 capsule    Take 1 capsule (75 mg) by mouth 2 times daily    Influenza A       * Notice:  This list has 2 medication(s) that are the same as other medications prescribed for you. Read the directions carefully, and ask your doctor or other care provider to review them with you.

## 2018-01-22 NOTE — PROGRESS NOTES
SUBJECTIVE:   Jada Lion is a 27 year old male who presents to clinic today for the following health issues:      Acute Illness   Acute illness concerns: cough, heartburn, headache and diarrhea   Onset: today    Fever: no     Chills/Sweats: YES    Headache (location?): YES    Sinus Pressure:no    Conjunctivitis:  no    Ear Pain: YES: both    Rhinorrhea: no     Congestion: no     Sore Throat: YES     Cough: YES-non-productive    Wheeze: YES    Decreased Appetite: yes    Nausea: YES    Vomiting: YES- once    Diarrhea:  YES    Dysuria/Freq.: no     Fatigue/Achiness: YES    Sick/Strep Exposure: YES     Therapies Tried and outcome: Tylenol and Ibuprofen    Patient didn't tolerate singualr very well in the past      No Known Allergies    Patient Active Problem List   Diagnosis     Moderate persistent asthma     Seasonal allergic rhinitis     Plantar fasciitis     Overweight (BMI 25.0-29.9)     CARDIOVASCULAR SCREENING; LDL GOAL LESS THAN 160     Moderate persistent asthma without complication     Hip pain, left     Tobacco use disorder     Rotator cuff injury, right, initial encounter     Cheilitis     Dry eyes       History reviewed. No pertinent past medical history.      Current Outpatient Prescriptions on File Prior to Visit:  diclofenac (VOLTAREN) 75 MG EC tablet Take 1 tablet (75 mg) by mouth 2 times daily as needed for moderate pain   fluticasone-salmeterol (ADVAIR) 250-50 MCG/DOSE diskus inhaler Inhale 1 puff into the lungs every 12 hours   nicotine (NICOTROL) 10 MG Inhaler Inhale 5 cartridge into the lungs daily as needed for smoking cessation   glycerin-hypromellose- (ARTIFICIAL TEARS) 0.2-0.2-1 % SOLN ophthalmic solution Place 1 drop into both eyes 4 times daily   albuterol (ALBUTEROL) 108 (90 BASE) MCG/ACT Inhaler Inhale 2 puffs into the lungs every 6 hours   albuterol (2.5 MG/3ML) 0.083% neb solution Take 1 vial (2.5 mg) by nebulization every 6 hours as needed for shortness of breath / dyspnea  "or wheezing     No current facility-administered medications on file prior to visit.     Social History   Substance Use Topics     Smoking status: Current Every Day Smoker     Smokeless tobacco: Never Used     Alcohol use Yes       Family History   Problem Relation Age of Onset     DIABETES Mother      Hypertension Mother      Asthma Mother      Other Cancer Maternal Grandfather      Other Cancer Paternal Grandfather        ROS:  Consitutional: As above  ENT: As above  Respiratory: As above    OBJECTIVE:  /68 (BP Location: Right arm, Patient Position: Chair, Cuff Size: Adult Large)  Pulse 87  Temp 98.6  F (37  C) (Oral)  Resp 18  Ht 5' 5.75\" (1.67 m)  Wt 172 lb (78 kg)  SpO2 97%  BMI 27.97 kg/m2  GENERAL APPEARANCE: healthy, alert and no distress  EYES: conjunctiva clear  HENT:    TMs w/o erythema, effusion or bulging.  Nose and mouth without ulcers, erythema or lesions.  NO tonsillar enlargement erythema or exudates.   NECK: supple, nontender, no lymphadenopathy  RESP: lungs clear to auscultation - no rales, rhonchi or wheezes  CV: regular rates and rhythm, normal S1 S2, no murmur noted  NEURO: awake, alert      INFLU NEGATIVE but daughterk being seen concurrently with similar symptoms, tested + for A    ASSESSMENT: Well appearing.    ICD-10-CM    1. Upper respiratory tract infection, unspecified type J06.9 Influenza A/B antigen   2. Influenza A J10.1 oseltamivir (TAMIFLU) 75 MG capsule   3. Moderate persistent asthma, unspecified whether complicated J45.40 albuterol (PROAIR HFA/PROVENTIL HFA/VENTOLIN HFA) 108 (90 BASE) MCG/ACT Inhaler     fluticasone-salmeterol (ADVAIR) 500-50 MCG/DOSE diskus inhaler       Increase advair  PLAN:  Lots of rest and fluids.  RTC if any worsening symptoms or if not improving.    Marcel Feng PA-C           "

## 2018-01-22 NOTE — PATIENT INSTRUCTIONS
Influenza (Adult)  Influenza, also called the flu, is a viral illness that affects the air passages of the lungs. It differs from the common cold. It is highly contagious. It may be spread through the air by coughing and sneezing or by direct contact (touching the sick person and then touching your own eyes, nose or mouth).  Illness starts 1-3 days after exposure and lasts for 1-2 weeks. Antibiotics are usually not needed unless a complication appears (ear or sinus infection or pneumonia).  Symptoms may be mild or severe and can include extreme tiredness (wanting to stay in bed all day), chills, fevers, muscle aching, soreness with eye movement, headache, and a dry, hacking cough.  Home Care:  Avoid exposure to cigarette smoke (yours or others ).  Tylenol or ibuprofen (Advil) will help fever, muscle aching, and headache. To avoid risk of liver injury, aspirin should not be used in children and teenagers under 18 with this illness.  Nausea and loss of appetite are common. A light diet is recommended. Avoid dehydration by drinking 6-8 glasses of fluids per day (water, sport drinks like Gatorade, soft drinks without caffeine, juices, tea, soup, etc.). Extra fluids will also help loosen secretions in the nose and lungs.  Over-the-counter cold medicines will not shorten the duration of the illness but may be helpful for the following symptoms: cough (Robitussin DM); sore throat (Chloraseptic lozenges or spray); nasal and sinus congestion (Actifed or Sudafed). [NOTE: Do not use decongestants if you have high blood pressure.]  Stay home until your fever has been gone for at least 24 hours (without the use of fever-reducing medications such as ibuprofen).  Follow Up  with your doctor or as directed by our staff if you are not improving over the next week.  Note: If you are age 65 or older, or if you have chronic asthma or COPD, we recommend a pneumococcal vaccination every five years. All adults should receive a yearly  influenza vaccination every autumn. Ask your doctor about this.  Get Prompt Medical Attention  if any of the following occur:  Cough with lots of colored sputum (mucus) or blood in your sputum  Chest pain, shortness of breath, wheezing, or difficulty breathing  Severe headache, face, neck or ear pain  New rash  Fever of 100.4 F (38 C) oral or higher, not better with fever medication  Confusion, behavior change or seizure  Severe weakness or dizziness    5150-[address], [place] [address]. All rights reserved. This information is not intended as a substitute for professional medical care. Always follow your healthcare professional's instructions.

## 2018-01-22 NOTE — NURSING NOTE
"Chief Complaint   Patient presents with     Cough       Initial /68 (BP Location: Right arm, Patient Position: Chair, Cuff Size: Adult Large)  Pulse 87  Temp 98.6  F (37  C) (Oral)  Resp 18  Ht 5' 5.75\" (1.67 m)  Wt 172 lb (78 kg)  SpO2 97%  BMI 27.97 kg/m2 Estimated body mass index is 27.97 kg/(m^2) as calculated from the following:    Height as of this encounter: 5' 5.75\" (1.67 m).    Weight as of this encounter: 172 lb (78 kg).  Medication Reconciliation: minh Thomason      "

## 2018-01-24 ASSESSMENT — ASTHMA QUESTIONNAIRES: ACT_TOTALSCORE: 6

## 2018-01-25 RX ORDER — ALBUTEROL SULFATE 90 UG/1
AEROSOL, METERED RESPIRATORY (INHALATION)
Qty: 17 INHALER | Refills: 3 | OUTPATIENT
Start: 2018-01-25

## 2018-03-05 ENCOUNTER — THERAPY VISIT (OUTPATIENT)
Dept: PHYSICAL THERAPY | Facility: CLINIC | Age: 28
End: 2018-03-05
Payer: COMMERCIAL

## 2018-03-05 ENCOUNTER — OFFICE VISIT (OUTPATIENT)
Dept: FAMILY MEDICINE | Facility: CLINIC | Age: 28
End: 2018-03-05
Payer: COMMERCIAL

## 2018-03-05 VITALS
TEMPERATURE: 98.5 F | WEIGHT: 167 LBS | SYSTOLIC BLOOD PRESSURE: 118 MMHG | HEIGHT: 66 IN | BODY MASS INDEX: 26.84 KG/M2 | HEART RATE: 63 BPM | OXYGEN SATURATION: 96 % | DIASTOLIC BLOOD PRESSURE: 80 MMHG

## 2018-03-05 DIAGNOSIS — M54.50 ACUTE LEFT-SIDED LOW BACK PAIN WITHOUT SCIATICA: ICD-10-CM

## 2018-03-05 DIAGNOSIS — M54.50 ACUTE LEFT-SIDED LOW BACK PAIN WITHOUT SCIATICA: Primary | ICD-10-CM

## 2018-03-05 DIAGNOSIS — R50.9 FEVER, UNSPECIFIED FEVER CAUSE: Primary | ICD-10-CM

## 2018-03-05 DIAGNOSIS — R68.89 FLU-LIKE SYMPTOMS: ICD-10-CM

## 2018-03-05 LAB
FLUAV+FLUBV AG SPEC QL: NEGATIVE
FLUAV+FLUBV AG SPEC QL: NEGATIVE
SPECIMEN SOURCE: NORMAL

## 2018-03-05 PROCEDURE — 97112 NEUROMUSCULAR REEDUCATION: CPT | Mod: GP | Performed by: PHYSICAL THERAPIST

## 2018-03-05 PROCEDURE — 99214 OFFICE O/P EST MOD 30 MIN: CPT | Performed by: PHYSICIAN ASSISTANT

## 2018-03-05 PROCEDURE — 97110 THERAPEUTIC EXERCISES: CPT | Mod: GP | Performed by: PHYSICAL THERAPIST

## 2018-03-05 PROCEDURE — 87804 INFLUENZA ASSAY W/OPTIC: CPT | Performed by: PHYSICIAN ASSISTANT

## 2018-03-05 PROCEDURE — 97161 PT EVAL LOW COMPLEX 20 MIN: CPT | Mod: GP | Performed by: PHYSICAL THERAPIST

## 2018-03-05 RX ORDER — METHOCARBAMOL 750 MG/1
750 TABLET, FILM COATED ORAL 3 TIMES DAILY PRN
Qty: 30 TABLET | Refills: 0 | Status: SHIPPED | OUTPATIENT
Start: 2018-03-05 | End: 2018-05-09

## 2018-03-05 RX ORDER — OSELTAMIVIR PHOSPHATE 75 MG/1
75 CAPSULE ORAL 2 TIMES DAILY
Qty: 10 CAPSULE | Refills: 0 | Status: SHIPPED | OUTPATIENT
Start: 2018-03-05 | End: 2018-05-09

## 2018-03-05 RX ORDER — BENZONATATE 200 MG/1
200 CAPSULE ORAL 3 TIMES DAILY PRN
Qty: 20 CAPSULE | Refills: 0 | Status: SHIPPED | OUTPATIENT
Start: 2018-03-05 | End: 2018-05-09

## 2018-03-05 ASSESSMENT — PAIN SCALES - GENERAL: PAINLEVEL: EXTREME PAIN (9)

## 2018-03-05 NOTE — PROGRESS NOTES
SUBJECTIVE:   Jada Lion is a 27 year old male who presents to clinic today for the following health issues:    Acute Illness   Acute illness concerns: Possible recurrent flu  Onset: 2days    Fever: YES    Chills/Sweats: YES    Headache (location?): YES    Sinus Pressure:YES    Conjunctivitis:  no    Ear Pain: YES: right    Rhinorrhea: no     Congestion: no     Sore Throat: YES     Cough: YES-productive of yellow sputum    Wheeze: YES- Hx asthma    Decreased Appetite: no     Nausea: YES    Vomiting: no     Diarrhea:  YES    Dysuria/Freq.: no     Fatigue/Achiness: YES    Sick/Strep Exposure: YES- friends/family     Therapies Tried and outcome: Ibuprofen, left over Tamiflu    tx'd for influ in January (daughetr tested +)    Has been using alb less since incr dose of advair at last visit.      Back Pain       Duration: 1week        Specific cause: none    Description:   Location of pain: low back bilateral  Character of pain: sharp, burning and pins and needles  Pain radiation:radiates to bilateral buttocks and mid back  New numbness or weakness in legs, not attributed to pain:  no     Intensity: Currently 9/10    History:   Pain interferes with job: YES  History of back problems: no prior back problems  Any previous MRI or X-rays: None  Sees a specialist for back pain:  No  Therapies tried without relief: physical home exercises found at home, working out    Alleviating factors:   Improved by: None      Precipitating factors:  Worsened by: Lifting, Bending, Standing, Sitting and Walking    Works in construction         Allergies   Allergen Reactions     Singulair [Montelukast]      Abdominal pain       Patient Active Problem List   Diagnosis     Moderate persistent asthma     Seasonal allergic rhinitis     Plantar fasciitis     Overweight (BMI 25.0-29.9)     CARDIOVASCULAR SCREENING; LDL GOAL LESS THAN 160     Moderate persistent asthma without complication     Hip pain, left     Tobacco use disorder      Rotator cuff injury, right, initial encounter     Cheilitis     Dry eyes       No past medical history on file.      Current Outpatient Prescriptions on File Prior to Visit:  albuterol (PROAIR HFA/PROVENTIL HFA/VENTOLIN HFA) 108 (90 BASE) MCG/ACT Inhaler Inhale 2 puffs into the lungs every 4 hours as needed for shortness of breath / dyspnea or wheezing   fluticasone-salmeterol (ADVAIR) 500-50 MCG/DOSE diskus inhaler Inhale 1 puff into the lungs 2 times daily   albuterol (2.5 MG/3ML) 0.083% neb solution Take 1 vial (2.5 mg) by nebulization every 6 hours as needed for shortness of breath / dyspnea or wheezing   [DISCONTINUED] fluticasone-salmeterol (ADVAIR) 250-50 MCG/DOSE diskus inhaler Inhale 1 puff into the lungs every 12 hours   [DISCONTINUED] albuterol (ALBUTEROL) 108 (90 BASE) MCG/ACT Inhaler Inhale 2 puffs into the lungs every 6 hours     No current facility-administered medications on file prior to visit.     Past Surgical History:   Procedure Laterality Date     DENTAL SURGERY  1-2015    wisdom teeth        Social History     Social History     Marital status: Single     Spouse name: N/A     Number of children: N/A     Years of education: N/A     Occupational History     Not on file.     Social History Main Topics     Smoking status: Current Every Day Smoker     Packs/day: 0.50     Smokeless tobacco: Never Used     Alcohol use Yes     Drug use: No     Sexual activity: Yes     Partners: Female     Birth control/ protection: None     Other Topics Concern     Not on file     Social History Narrative       REVIEW OF SYSTEMS  General: + for chills  Resp: + as above  ENt + as above      : negative for dysuria , incontinence, frequency  Musculoskeletal: as above  Neurologic: negative for ataxia, saddle anesthesia, fecal incontinence, one sided weakness,  paresthesias    Physical Exam:  Vitals: /80 (BP Location: Left arm, Patient Position: Chair, Cuff Size: Adult Regular)  Pulse 63  Temp 98.5  F (36.9  C)  "(Oral)  Ht 5' 5.75\" (1.67 m)  Wt 167 lb (75.8 kg)  SpO2 96%  BMI 27.16 kg/m2  BMI= Body mass index is 27.16 kg/(m^2).  Constitutional: healthy, alert and no acute distress   ENT_ TMs intact and clear, no throat redness or swelling  CARDIO: RRR, no MRG  RESP: lungs CTA, NAD  NEURO: Patellar reflexes intact and equal b/l  BACK:  Straight leg raise intact, + LS paraspinal muscle TTP, strength intact and equal b/l lower extremities  GAIT: intact  Psychiatric: mentation appears normal and affect normal/bright    INFLU NEG    Impression: Back pain, uncomplicated.  Work note, p/t.  Discussed tx options for URI- amenable to empiric t x as below.      ICD-10-CM    1. Fever, unspecified fever cause R50.9 Influenza A/B antigen   2. Flu-like symptoms R68.89 oseltamivir (TAMIFLU) 75 MG capsule     benzonatate (TESSALON) 200 MG capsule   3. Acute left-sided low back pain without sciatica M54.5 [facility] AND CHIR[facility]RACTIC REFERRAL     methocarbamol (ROBAXIN) 750 MG tablet       Plan:  Instructions for back care and return precautions discussed.        Marcel Feng PA-C      "

## 2018-03-05 NOTE — MR AVS SNAPSHOT
After Visit Summary   3/5/2018    Jada Lion    MRN: [id number]           Patient Information     Date Of Birth          1990        Visit Information        Provider Department      3/5/2018 7:40 AM Nate Feng PA [facility]        Today's Diagnoses     Fever, unspecified fever cause    -  1    Flu-like symptoms        Acute left-sided low back pain without sciatica          Care Instructions    At [facility], we strive to deliver an exceptional experience to you, every time we see you.  If you receive a survey in the mail, please send us back your thoughts. We really do value your feedback.    Based on your medical history, these are the current health maintenance/preventive care services that you are due for (some may have been done at this visit.)  There are no preventive care reminders to display for this patient.      Suggested websites for health information:  Www.Upclique : Up to date and easily searchable information on multiple topics.  Www.medlineplus.gov : medication info, interactive tutorials, watch real surgeries online  Www.familydoctor.org : good info from the Academy of Family Physicians  Www.cdc.gov : public health info, travel advisories, epidemics (H1N1)  Www.aap.org : children's health info, normal development, vaccinations  Www.health.[facility].[place].us : [place] dept of health, public health issues in [place], N1N1    Your care team:                            Family Medicine Internal Medicine   MD Michael Ma MD Shantel Branch-Fleming, MD Katya Georgiev PA-C Nam Ho, MD Pediatrics   CHAVA Corado, MD Krista Velasquez CNP, MD Deborah Mielke, MD Kim Thein, RADHIKA CNP      Clinic hours: Monday - Thursday 7 am-7 pm; Fridays 7 am-5 pm.   Urgent care: Monday - Friday 11 am-9 pm; Saturday and Sunday 9 am-5 pm.  Pharmacy : Monday -Thursday 8  am-8 pm; Friday 8 am-6 pm; Saturday and Sunday 9 am-5 pm.     Clinic: [phone]   Pharmacy: [phone]  Trial over the counter symptomatic relief, rest, and drink plenty of fluids. Salt water gargles and nasal lavage may also be helpful.  Return to clinic or Emergency Department for breathing/swallowing problems, fever >105, altered mental status, or worsening general condition.      Viral Respiratory Illness:  You have an Upper Respiratory Illness (URI) caused by a virus. This illness is contagious during the first few days. It is spread through the air by coughing and sneezing or by direct contact (touching the sick person and then touching your own eyes, nose or mouth). Most viral illnesses go away within 7-10 days with rest and simple home remedies. Sometimes, the illness may last for several weeks. Antibiotics will not kill a virus and are generally not prescribed for this condition.  Home Care:  1) If symptoms are severe, rest at home for the first 2-3 days. When you resume activity, don't let yourself get too tired.  2) Avoid being exposed to cigarette smoke (yours or others ).  3) Tylenol (acetaminophen) or ibuprofen (Advil, Motrin) will help fever, muscle aching and headache. (Persons under 18 with fever should not take aspirin since this may cause liver damage.)  4) Your appetite may be poor, so a light diet is fine. Avoid dehydration by drinking 6-8 glasses of fluids per day (water, soft, drinks, juices, tea, soup, etc.). Extra fluids will help loosen secretions in the nose and lungs.  5) Over-the-counter cold medicines will not shorten the length of time you re sick, but they may be helpful for the following symptoms: cough (Robitussin DM); sore throat (Chloraseptic lozenges or spray); nasal and sinus congestion (Actifed, Sudafed, Chlortrimeton).  Follow Up  with your doctor or as advised if you don t improve over the next week.  Get Prompt Medical Attention  if any of the following occur:  --  Cough with lots of colored sputum (mucus) or blood in your sputum  -- Chest pain, shortness of breath, wheezing or have trouble breathing  -- Severe headache; face, neck or ear pain  -- Fever over 100.4  F (38.0  C) for more than three days  -- You can t swallow due to throat pain    1953-4093 [employer], [address] [place] [address], [place] [address]. All rights reserved. This information is not intended as a substitute for professional medical care. Always follow your healthcare professional's instructions.      Apply ice for 24 hours then alternate heat or ice, which ever feels better. Return to clinic or Emergency Department  for uncontrolled pain, chest pain, shortness of breath, fever, numbness, incontinence or urinary problems.    Neck/Back Pain [General]  Both neck and back pain are usually caused by injury to the muscles or ligaments of the spine. Sometimes the disks that separate each bone of the spine may cause pain by putting pressure on a nearby nerve. Back and neck pain may appear after a sudden twisting/bending force (such as in a car accident), or sometimes after a simple awkward movement. In either case, muscle spasm is often present and adds to the pain.  Acute neck and back pain usually gets better in one to two weeks. Pain related to disk disease, arthritis in the spinal joints or spinal stenosis (narrowing of the spinal canal) can become chronic and last for months or years.  Home Care:  FOR NECK PAIN: Use a comfortable pillow that supports the head and keeps the spine in a neutral position. The position of the head should not be tilted forward or backward.  FOR BACK PAIN: You may need to stay in bed the first few days. But, as soon as possible, begin sitting or walking to avoid problems with prolonged bed rest (muscle weakness, worsening back stiffness and pain, blood clots in the legs).  When in bed, try to find a position of comfort. A firm mattress is best. Try lying flat on your back with  pillows under your knees. You can also try lying on your side with your knees bent up towards your chest and a pillow between your knees.  Avoid prolonged sitting. This puts more stress on the lower back than standing or walking.  During the first two days after injury, apply an ICE PACK to the painful area for 20 minutes every 2-4 hours. This will reduce swelling and pain. HEAT (hot shower, hot bath or heating pad) works well for muscle spasm. You can start with ice, then switch to heat after two days. Some patients feel best alternating ice and heat treatments. Use the one method that feels the best to you.  You may use acetaminophen (Tylenol) or ibuprofen (Motrin, Advil) to control pain, unless another pain medicine was prescribed. [NOTE: If you have chronic liver or kidney disease or ever had a stomach ulcer or GI bleeding, talk with your doctor before using these medicines.]  Be aware of safe lifting methods and do not lift anything over 15 pounds until all the pain is gone.  Follow Up  with your physician or this facility if your symptoms do not start to improve after one week. Physical therapy or further tests may be needed.  [NOTE: If X-rays were taken, they will be reviewed by a radiologist. You will be notified of any new findings that may affect your care.]  Get Prompt Medical Attention  if any of the following occur:  Pain becomes worse or spreads into your arms or legs  Weakness, numbness or pain in one or both arms or legs  Loss of bowel or bladder control  Numbness in the groin area  Difficulty walking  Fever of 100.4 F (38 C) or higher, or as directed by your healthcare provider    8330-4639 [employer] \[facility] [place] [facility]\"", [address] [place] [address], [place] [address]. All rights reserved. This information is not intended as a substitute for professional medical care. Always follow your healthcare professional's instructions.                  Follow-ups after your visit        Additional Services     [facility], HAND, AND  CHIROPRACTIC REFERRAL       **This order will print in the [facility] Scheduling Office**    Physical Therapy, Hand Therapy and Chiropractic Care are available through:    *[facility]  *[facility]  *[facility]    Call one number to schedule at any of the above locations: [phone].    Your provider has referred you to: [facility] - PT and/or Chiropractic Care determined by clinical presentation at [facility] or [facility] Initial Visit    Indication/Reason for Referral: Low Back Pain  Onset of Illness: 1 week  Therapy Orders: Evaluate and Treat  Special Programs: None  Special Request: None    Tatiana Simpson      Additional Comments for the Therapist or Chiropractor: no    Please be aware that coverage of these services is subject to the terms and limitations of your health insurance plan.  Call member services at your health plan with any benefit or coverage questions.      Please bring the following to your appointment:    *Your personal calendar for scheduling future appointments  *Comfortable clothing                  Follow-up notes from your care team     Return if symptoms worsen or fail to improve.      Your next 10 appointments already scheduled     Mar 05, 2018  9:50 AM CST   (Arrive by 9:35 AM)   [facility] Spine with Fidel Estrada, PT   [facility] Laura Jennings ([facility] Laura Jennings  )    [address]  [facility] [id number] [phone]              Who to contact     If you have questions or need follow up information about today's clinic visit or your schedule please contact [facility] [place] [facility]ENN[facility] directly at [phone].  Normal or non-critical lab and imaging results will be communicated to you by MyChart, letter or phone within 4 business days after the clinic has received the results. If you do not hear from us within 7 days, please contact the clinic through MyChart or phone. If you have a critical or  "abnormal lab result, we will notify you by phone as soon as possible.  Submit refill requests through [facility] or call your pharmacy and they will forward the refill request to us. Please allow 3 business days for your refill to be completed.          Additional Information About Your Visit        [employer] Information     [facility] gives you secure access to your electronic health record. If you see a primary care provider, you can also send messages to your care team and make appointments. If you have questions, please call your primary care clinic.  If you do not have a primary care provider, please call [phone] and they will assist you.        Care EveryWhere ID     This is your Care EveryWhere ID. This could be used by other organizations to access your [facility] medical records  [id number]        Your Vitals Were     Pulse Temperature Height Pulse Oximetry BMI (Body Mass Index)       63 98.5  F (36.9  C) (Oral) 5' 5.75\" (1.67 m) 96% 27.16 kg/m2        Blood Pressure from Last 3 Encounters:   03/05/18 118/80   01/22/18 126/68   01/03/18 102/64    Weight from Last 3 Encounters:   03/05/18 167 lb (75.8 kg)   01/22/18 172 lb (78 kg)   01/03/18 171 lb 12.8 oz (77.9 kg)              We Performed the Following     [facility], AND CHIROPRACTIC REFERRAL     Influenza A/B antigen          Today's Medication Changes          These changes are accurate as of 3/5/18  8:55 AM.  If you have any questions, ask your nurse or doctor.               Start taking these medicines.        Dose/Directions    benzonatate 200 MG capsule   Commonly known as:  TESSALON   Used for:  Flu-like symptoms   Started by:  Nate Feng PA        Dose:  200 mg   Take 1 capsule (200 mg) by mouth 3 times daily as needed for cough   Quantity:  20 capsule   Refills:  0       methocarbamol 750 MG tablet   Commonly known as:  ROBAXIN   Used for:  Acute left-sided low back pain without sciatica   Started by:  Nate Feng, " PA        Dose:  750 mg   Take 1 tablet (750 mg) by mouth 3 times daily as needed for muscle spasms   Quantity:  30 tablet   Refills:  0       oseltamivir 75 MG capsule   Commonly known as:  TAMIFLU   Used for:  Flu-like symptoms   Started by:  Nate Feng PA        Dose:  75 mg   Take 1 capsule (75 mg) by mouth 2 times daily   Quantity:  10 capsule   Refills:  0            Where to get your medicines      These medications were sent to [place] [address] [facility] [place] [facility], [place] - [address]  [address] [place] [facility] [place] [address]     Phone:  [phone]     benzonatate 200 MG capsule    methocarbamol 750 MG tablet    oseltamivir 75 MG capsule                Primary Care Provider Office Phone # Fax #    Ariana Cate Gonzalez PA-C [phone] [address] [place] [address]        Equal Access to Services     [facility]: Hadii bebeto casianoo Soluis a, waaxda luqadaha, qaybta kaalmada adeegyada, clover constantino . So [facility] [phone].    ATENCIÓN: Si habla español, tiene a antunez disposición servicios gratuitos de asistencia lingüística. Llame al [phone].    We comply with applicable federal civil rights laws and [place] laws. We do not discriminate on the basis of race, color, national origin, age, disability, sex, sexual orientation, or gender identity.            Thank you!     Thank you for choosing [facility]  for your care. Our goal is always to provide you with excellent care. Hearing back from our patients is one way we can continue to improve our services. Please take a few minutes to complete the written survey that you may receive in the mail after your visit with us. Thank you!             Your Updated Medication List - Protect others around you: Learn how to safely use, store and throw away your medicines at www.disposemymeds.org.          This list is accurate as of 3/5/18  8:55 AM.  Always use  your most recent med list.                   Brand Name Dispense Instructions for use Diagnosis    * albuterol (2.5 MG/3ML) 0.083% neb solution     60 vial    Take 1 vial (2.5 mg) by nebulization every 6 hours as needed for shortness of breath / dyspnea or wheezing        * albuterol 108 (90 BASE) MCG/ACT Inhaler    PROAIR HFA/PROVENTIL HFA/VENTOLIN HFA    1 Inhaler    Inhale 2 puffs into the lungs every 4 hours as needed for shortness of breath / dyspnea or wheezing    Moderate persistent asthma, unspecified whether complicated       benzonatate 200 MG capsule    TESSALON    20 capsule    Take 1 capsule (200 mg) by mouth 3 times daily as needed for cough    Flu-like symptoms       fluticasone-salmeterol 500-50 MCG/DOSE diskus inhaler    ADVAIR    1 Inhaler    Inhale 1 puff into the lungs 2 times daily    Moderate persistent asthma, unspecified whether complicated       methocarbamol 750 MG tablet    ROBAXIN    30 tablet    Take 1 tablet (750 mg) by mouth 3 times daily as needed for muscle spasms    Acute left-sided low back pain without sciatica       oseltamivir 75 MG capsule    TAMIFLU    10 capsule    Take 1 capsule (75 mg) by mouth 2 times daily    Flu-like symptoms       * Notice:  This list has 2 medication(s) that are the same as other medications prescribed for you. Read the directions carefully, and ask your doctor or other care provider to review them with you.

## 2018-03-05 NOTE — PATIENT INSTRUCTIONS
At [facility], we strive to deliver an exceptional experience to you, every time we see you.  If you receive a survey in the mail, please send us back your thoughts. We really do value your feedback.    Based on your medical history, these are the current health maintenance/preventive care services that you are due for (some may have been done at this visit.)  There are no preventive care reminders to display for this patient.      Suggested websites for health information:  Www.[facility]pet[facility]all.org : Up to date and easily searchable information on multiple topics.  Www.medlineplus.gov : medication info, interactive tutorials, watch real surgeries online  Www.familydoctor.org : good info from the Academy of Family Physicians  Www.cdc.gov : public health info, travel advisories, epidemics (H1N1)  Www.aap.org : children's health info, normal development, vaccinations  Www.health.[facility].[place].us : [place] dept of health, public health issues in [place], N1N1    Your care team:                            Family Medicine Internal Medicine   MD Michael Ma MD Shantel Branch-Fleming, MD Katya Georgiev PA-C Nam Ho, MD Pediatrics   CHAVA Corado, JAMISON Galeano APRN CNP   MD Krista Lawrence MD Deborah Mielke, MD Kim Thein, APRN [facility]      Clinic hours: Monday - Thursday 7 am-7 pm; Fridays 7 am-5 pm.   Urgent care: Monday - Friday 11 am-9 pm; Saturday and Sunday 9 am-5 pm.  Pharmacy : Monday -Thursday 8 am-8 pm; Friday 8 am-6 pm; Saturday and Sunday 9 am-5 pm.     Clinic: [phone]   Pharmacy: [phone]  Trial over the counter symptomatic relief, rest, and drink plenty of fluids. Salt water gargles and nasal lavage may also be helpful.  Return to clinic or Emergency Department for breathing/swallowing problems, fever >105, altered mental status, or worsening general condition.      Viral Respiratory Illness:  You have an Upper Respiratory Illness  (URI) caused by a virus. This illness is contagious during the first few days. It is spread through the air by coughing and sneezing or by direct contact (touching the sick person and then touching your own eyes, nose or mouth). Most viral illnesses go away within 7-10 days with rest and simple home remedies. Sometimes, the illness may last for several weeks. Antibiotics will not kill a virus and are generally not prescribed for this condition.  Home Care:  1) If symptoms are severe, rest at home for the first 2-3 days. When you resume activity, don't let yourself get too tired.  2) Avoid being exposed to cigarette smoke (yours or others ).  3) Tylenol (acetaminophen) or ibuprofen (Advil, Motrin) will help fever, muscle aching and headache. (Persons under 18 with fever should not take aspirin since this may cause liver damage.)  4) Your appetite may be poor, so a light diet is fine. Avoid dehydration by drinking 6-8 glasses of fluids per day (water, soft, drinks, juices, tea, soup, etc.). Extra fluids will help loosen secretions in the nose and lungs.  5) Over-the-counter cold medicines will not shorten the length of time you re sick, but they may be helpful for the following symptoms: cough (Robitussin DM); sore throat (Chloraseptic lozenges or spray); nasal and sinus congestion (Actifed, Sudafed, Chlortrimeton).  Follow Up  with your doctor or as advised if you don t improve over the next week.  Get Prompt Medical Attention  if any of the following occur:  -- Cough with lots of colored sputum (mucus) or blood in your sputum  -- Chest pain, shortness of breath, wheezing or have trouble breathing  -- Severe headache; face, neck or ear pain  -- Fever over 100.4  F (38.0  C) for more than three days  -- You can t swallow due to throat pain    6712-9067 [employer], [address] [place] [address], [place] [address]. All rights reserved. This information is not intended as a substitute for professional medical care. Always  follow your healthcare professional's instructions.      Apply ice for 24 hours then alternate heat or ice, which ever feels better. Return to clinic or Emergency Department  for uncontrolled pain, chest pain, shortness of breath, fever, numbness, incontinence or urinary problems.    Neck/Back Pain [General]  Both neck and back pain are usually caused by injury to the muscles or ligaments of the spine. Sometimes the disks that separate each bone of the spine may cause pain by putting pressure on a nearby nerve. Back and neck pain may appear after a sudden twisting/bending force (such as in a car accident), or sometimes after a simple awkward movement. In either case, muscle spasm is often present and adds to the pain.  Acute neck and back pain usually gets better in one to two weeks. Pain related to disk disease, arthritis in the spinal joints or spinal stenosis (narrowing of the spinal canal) can become chronic and last for months or years.  Home Care:  FOR NECK PAIN: Use a comfortable pillow that supports the head and keeps the spine in a neutral position. The position of the head should not be tilted forward or backward.  FOR BACK PAIN: You may need to stay in bed the first few days. But, as soon as possible, begin sitting or walking to avoid problems with prolonged bed rest (muscle weakness, worsening back stiffness and pain, blood clots in the legs).  When in bed, try to find a position of comfort. A firm mattress is best. Try lying flat on your back with pillows under your knees. You can also try lying on your side with your knees bent up towards your chest and a pillow between your knees.  Avoid prolonged sitting. This puts more stress on the lower back than standing or walking.  During the first two days after injury, apply an ICE PACK to the painful area for 20 minutes every 2-4 hours. This will reduce swelling and pain. HEAT (hot shower, hot bath or heating pad) works well for muscle spasm. You can start  with ice, then switch to heat after two days. Some patients feel best alternating ice and heat treatments. Use the one method that feels the best to you.  You may use acetaminophen (Tylenol) or ibuprofen (Motrin, Advil) to control pain, unless another pain medicine was prescribed. [NOTE: If you have chronic liver or kidney disease or ever had a stomach ulcer or GI bleeding, talk with your doctor before using these medicines.]  Be aware of safe lifting methods and do not lift anything over 15 pounds until all the pain is gone.  Follow Up  with your physician or this facility if your symptoms do not start to improve after one week. Physical therapy or further tests may be needed.  [NOTE: If X-rays were taken, they will be reviewed by a radiologist. You will be notified of any new findings that may affect your care.]  Get Prompt Medical Attention  if any of the following occur:  Pain becomes worse or spreads into your arms or legs  Weakness, numbness or pain in one or both arms or legs  Loss of bowel or bladder control  Numbness in the groin area  Difficulty walking  Fever of 100.4 F (38 C) or higher, or as directed by your healthcare provider    6197-5473 [employer] [facility], [address] [place] [address], [place] [address]. All rights reserved. This information is not intended as a substitute for professional medical care. Always follow your healthcare professional's instructions.

## 2018-03-05 NOTE — MR AVS SNAPSHOT
After Visit Summary   3/5/2018    Jada Lion    MRN: [id number]           Patient Information     Date Of Birth          1990        Visit Information        Provider Department      3/5/2018 9:50 AM Fidel Estrada PT [facility]        Today's Diagnoses     Acute left-sided low back pain without sciatica    -  1       Follow-ups after your visit        Your next 10 appointments already scheduled     Mar 19, 2018  3:40 PM CDT   [facility] Spine with Fidel Estrada PT   [facility] ([facility]ETH [place]  )    [address]  [facility] [id number] [phone]              Who to contact     If you have questions or need follow up information about today's clinic visit or your schedule please contact [facility] directly at [phone].  Normal or non-critical lab and imaging results will be communicated to you by MyChart, letter or phone within 4 business days after the clinic has received the results. If you do not hear from us within 7 days, please contact the clinic through [employer]ie[employer] or phone. If you have a critical or abnormal lab result, we will notify you by phone as soon as possible.  Submit refill requests through [employer] or call your pharmacy and they will forward the refill request to us. Please allow 3 business days for your refill to be completed.          Additional Information About Your Visit        MyChart Information     [employer] gives you secure access to your electronic health record. If you see a primary care provider, you can also send messages to your care team and make appointments. If you have questions, please call your primary care clinic.  If you do not have a primary care provider, please call [phone] and they will assist you.        Care EveryWhere ID     This is your Care EveryWhere ID. This could be used by other organizations to access your [place]  medical records  [id number]         Blood Pressure from Last 3 Encounters:   03/05/18 118/80   01/22/18 126/68   01/03/18 102/64    Weight from Last 3 Encounters:   03/05/18 75.8 kg (167 lb)   01/22/18 78 kg (172 lb)   01/03/18 77.9 kg (171 lb 12.8 oz)              We Performed the Following     [facility]IZ[facility]ETH Inital Eval Report     Neuromuscular Re-Education     PT Rachna, Low Complexity (51522)     Therapeutic Exercises          Today's Medication Changes          These changes are accurate as of 3/5/18 10:57 PM.  If you have any questions, ask your nurse or doctor.               Start taking these medicines.        Dose/Directions    benzonatate 200 MG capsule   Commonly known as:  TESSALON   Used for:  Flu-like symptoms   Started by:  Nate Feng PA        Dose:  200 mg   Take 1 capsule (200 mg) by mouth 3 times daily as needed for cough   Quantity:  20 capsule   Refills:  0       methocarbamol 750 MG tablet   Commonly known as:  ROBAXIN   Used for:  Acute left-sided low back pain without sciatica   Started by:  Nate Feng PA        Dose:  750 mg   Take 1 tablet (750 mg) by mouth 3 times daily as needed for muscle spasms   Quantity:  30 tablet   Refills:  0       oseltamivir 75 MG capsule   Commonly known as:  TAMIFLU   Used for:  Flu-like symptoms   Started by:  Nate Feng PA        Dose:  75 mg   Take 1 capsule (75 mg) by mouth 2 times daily   Quantity:  10 capsule   Refills:  0            Where to get your medicines      These medications were sent to [facility] [place] - [address]  [address] [place] [address]     Phone:  [phone]     benzonatate 200 MG capsule    methocarbamol 750 MG tablet    oseltamivir 75 MG capsule                Primary Care Provider Office Phone # Fax #    Ariana Gonzalez PA-C [phone] [address] [place] [address]        Equal Access to Services     [facility]  [facility]: Hadii aad ku haduyen Vyasali, waaxda luqadaha, qaybta kaalmada tadeo, clover lindain hayaashukri cruzish rodriguez dougie . So [facility] [phone].    ATENCIÓN: Si habla español, tiene a antunez disposición servicios gratuitos de asistencia lingüística. Llame al [phone].    We comply with applicable federal civil rights laws and [place] laws. We do not discriminate on the basis of race, color, national origin, age, disability, sex, sexual orientation, or gender identity.            Thank you!     Thank you for choosing [place] [facility]  for your care. Our goal is always to provide you with excellent care. Hearing back from our patients is one way we can continue to improve our services. Please take a few minutes to complete the written survey that you may receive in the mail after your visit with us. Thank you!             Your Updated Medication List - Protect others around you: Learn how to safely use, store and throw away your medicines at www.disposemymeds.org.          This list is accurate as of 3/5/18 10:57 PM.  Always use your most recent med list.                   Brand Name Dispense Instructions for use Diagnosis    * albuterol (2.5 MG/3ML) 0.083% neb solution     60 vial    Take 1 vial (2.5 mg) by nebulization every 6 hours as needed for shortness of breath / dyspnea or wheezing        * albuterol 108 (90 BASE) MCG/ACT Inhaler    PROAIR HFA/PROVENTIL HFA/VENTOLIN HFA    1 Inhaler    Inhale 2 puffs into the lungs every 4 hours as needed for shortness of breath / dyspnea or wheezing    Moderate persistent asthma, unspecified whether complicated       benzonatate 200 MG capsule    TESSALON    20 capsule    Take 1 capsule (200 mg) by mouth 3 times daily as needed for cough    Flu-like symptoms       fluticasone-salmeterol 500-50 MCG/DOSE diskus inhaler    ADVAIR    1 Inhaler    Inhale 1 puff into the lungs 2 times daily    Moderate persistent asthma, unspecified whether  complicated       methocarbamol 750 MG tablet    ROBAXIN    30 tablet    Take 1 tablet (750 mg) by mouth 3 times daily as needed for muscle spasms    Acute left-sided low back pain without sciatica       oseltamivir 75 MG capsule    TAMIFLU    10 capsule    Take 1 capsule (75 mg) by mouth 2 times daily    Flu-like symptoms       * Notice:  This list has 2 medication(s) that are the same as other medications prescribed for you. Read the directions carefully, and ask your doctor or other care provider to review them with you.

## 2018-03-05 NOTE — PROGRESS NOTES
[facility] Initial Evaluation  Subjective:  Patient is a 27 year old male presenting with rehab back hpi.   Jada Lion is a 27 year old male with a lumbar condition.  Condition occurred with:  Insidious onset (VOLLEYBALL.).  Condition occurred: during recreation/sport ([employer]).    2/25/2018..    Patient reports pain:  Lumbar spine right and lumbar spine left.  Radiates to:  Gluteals right.  Pain is described as sharp, shooting, stabbing, aching, burning and cramping and is constant and reported as 9/10.  Associated symptoms:  Tingling. Pain is the same all the time.  Symptoms are exacerbated by bending, lifting and sitting (COUGH / SNEEZE. ) and relieved by rest.  Since onset symptoms are gradually worsening.  Special testing: NONE.  Previous treatment: NONE.    General health as reported by patient is excellent.  Pertinent medical history includes:  Asthma and smoking.  Medical allergies: yes.  Other surgeries include:  No.  Current medications:  Pain medication.  Current occupation is [employer]. .  Patient is working in normal job without restrictions.  Primary job tasks include:  Prolonged standing and repetitive tasks.    Barriers include:  None as reported by the patient.    Red flags: NONE COMMUNICATED.                         Objective:  System    Physical Exam      [place] Lumbar Evaluation    Posture:  Sitting: poor        Correction of Posture: no effect    Movement Loss:  Flexion (Flex): pain and mod  Extension (EXT): pain        Static Tests:          Lying Prone in Extension: PRONE LYING    Conclusion: derangement  Principle of Treatment:  Posture Correction: IN SITTING WITH TOWEL ROLL.     Extension: EIS/EIL, ADIEL.    Other: NEUTRAL SPINE, THER EX, THER ACT, NMR, MANUAL THERAPY.                                        ROS    Assessment/Plan:    Patient is a 27 year old male with lumbar complaints.    Patient has the following significant findings with corresponding  treatment plan.                Diagnosis 1:  ACUTE LEFT SIDED LOW BACK PAIN WITHOUT SCIATICA  Pain -  hot/cold therapy, US, electric stimulation, manual therapy, splint/taping/bracing/orthotics, self management, education, directional preference exercise and home program  Decreased ROM/flexibility - manual therapy, therapeutic exercise, therapeutic activity and home program  Decreased function - therapeutic activities and home program  Impaired posture - neuro re-education, therapeutic activities and home program    Therapy Evaluation Codes:   1) History comprised of:   Personal factors that impact the plan of care:      Past/current experiences.    Comorbidity factors that impact the plan of care are:      None.     Medications impacting care: None.  2) Examination of Body Systems comprised of:   Body structures and functions that impact the plan of care:      Lumbar spine.   Activity limitations that impact the plan of care are:      Bathing, Bending, Driving, Dressing, Lifting, Running, Sitting, Sports, Squatting/kneeling, Standing, Working and Transfers..  3) Clinical presentation characteristics are:   Stable/Uncomplicated.  4) Decision-Making    Low complexity using standardized patient assessment instrument and/or measureable assessment of functional outcome.  Cumulative Therapy Evaluation is: Low complexity.    Previous and current functional limitations:  (See Goal Flow Sheet for this information)    Short term and Long term goals: (See Goal Flow Sheet for this information)     Communication ability:  Patient appears to be able to clearly communicate and understand verbal and written communication and follow directions correctly.  Treatment Explanation - The following has been discussed with the patient:   RX ordered/plan of care  Anticipated outcomes  Possible risks and side effects  This patient would benefit from PT intervention to resume normal activities.   Rehab potential is good.    Frequency:  1 X  week, once daily  Duration:  for 6 weeks  Discharge Plan:  Achieve all LTG.  Independent in home treatment program.  Reach maximal therapeutic benefit.    Please refer to the daily flowsheet for treatment today, total treatment time and time spent performing 1:1 timed codes.

## 2018-03-05 NOTE — LETTER
[address] [address]  Phone: [phone]    March 5, 2018        Jada SHANNON Lion  [address]  [facility] [place] [address]          To whom it may concern:    RE: [place] [employer]    Patient was seen and treated today at our clinic.  Patient excused from work until 3/12/18 unless feeling better sooner.    Patient may return to work without restrictions after that.          Please contact me for questions or concerns.      Sincerely,        JAMEEL Preciado

## 2018-03-05 NOTE — LETTER
[facility]  [address]  [facility] [address]  [phone]    2018    Re: Jada Lion   :   1990  MRN:  [id number]   REFERRING PHYSICIAN:   Nate Feng    [facility]    Date of Initial Evaluation:  2018  Visits:  Rxs Used: 1  Reason for Referral:  Acute left-sided low back pain without sciatica    EVALUATION SUMMARY    [facility] Initial Evaluation    Subjective:  Patient is a 27 year old male presenting with rehab back hpi.   Jada Lion is a 27 year old male with a lumbar condition.  Condition occurred with:  Insidious onset (VOLLEYBALL.).  Condition occurred: during recreation/sport ([employer]).    2018..    Patient reports pain:  Lumbar spine right and lumbar spine left.  Radiates to:  Gluteals right.  Pain is described as sharp, shooting, stabbing, aching, burning and cramping and is constant and reported as 9/10.  Associated symptoms:  Tingling. Pain is the same all the time.  Symptoms are exacerbated by bending, lifting and sitting (COUGH / SNEEZE. ) and relieved by rest.  Since onset symptoms are gradually worsening.  Special testing: NONE.  Previous treatment: NONE.    General health as reported by patient is excellent.  Pertinent medical history includes:  Asthma and smoking.  Medical allergies: yes.  Other surgeries include:  No.  Current medications:  Pain medication.  Current occupation is [employer] .  Patient is working in normal job without restrictions.  Primary job tasks include:  Prolonged standing and repetitive tasks.  Barriers include:  None as reported by the patient.  Red flags: NONE COMMUNICATED.   Pertinent medical history includes:  Asthma and smoking.    Other surgeries include:  Other (Hernia).    Current occupation is / Student.    Primary job tasks include:  Prolonged standing, lifting and repetitive  tasks.  Oswestry Score: 54 %         [place] Lumbar Evaluation  Posture:  Sitting: poor  Correction of Posture: no effect  Movement Loss:  Flexion (Flex): pain and mod  Extension (EXT): pain  Static Tests:  Lying Prone in Extension: PRONE LYING      Re: Jada Lion   :   1990    Conclusion: derangement  Principle of Treatment:  Posture Correction: IN SITTING WITH TOWEL ROLL.   Extension: EIS/EIL, ADIEL.  Other: NEUTRAL SPINE, THER EX, THER ACT, NMR, MANUAL THERAPY.     Assessment/Plan:    Patient is a 27 year old male with lumbar complaints.    Patient has the following significant findings with corresponding treatment plan.                Diagnosis 1:  ACUTE LEFT SIDED LOW BACK PAIN WITHOUT SCIATICA  Pain -  hot/cold therapy, US, electric stimulation, manual therapy, splint/taping/bracing/orthotics, self management, education, directional preference exercise and home program  Decreased ROM/flexibility - manual therapy, therapeutic exercise, therapeutic activity and home program  Decreased function - therapeutic activities and home program  Impaired posture - neuro re-education, therapeutic activities and home program  Therapy Evaluation Codes:   1) History comprised of:   Personal factors that impact the plan of care:      Past/current experiences.    Comorbidity factors that impact the plan of care are:      None.     Medications impacting care: None.  2) Examination of Body Systems comprised of:   Body structures and functions that impact the plan of care:      Lumbar spine.   Activity limitations that impact the plan of care are:      Bathing, Bending, Driving, Dressing, Lifting, Running, Sitting, Sports, Squatting/kneeling, Standing, Working and Transfers..  3) Clinical presentation characteristics are:   Stable/Uncomplicated.  4) Decision-Making    Low complexity using standardized patient assessment instrument and/or measureable assessment of functional outcome.  Cumulative Therapy Evaluation is:  Low complexity.  Previous and current functional limitations:  (See Goal Flow Sheet for this information)    Short term and Long term goals: (See Goal Flow Sheet for this information)   Communication ability:  Patient appears to be able to clearly communicate and understand verbal and written communication and follow directions correctly.  Treatment Explanation - The following has been discussed with the patient:   RX ordered/plan of care  Anticipated outcomes  Possible risks and side effects  This patient would benefit from PT intervention to resume normal activities.   Rehab potential is good.          Re: Jada Lion   :   1990    Frequency:  1 X week, once daily  Duration:  for 6 weeks  Discharge Plan:  Achieve all LTG.  Independent in home treatment program.  Reach maximal therapeutic benefit.        Thank you for your referral.    INQUIRIES  Therapist: Fidel Estrada, PT  [facility]  [address]  [facility] [address]  Phone: [phone]  Fax: [phone]

## 2018-03-06 NOTE — PROGRESS NOTES
[facility] Initial Evaluation  Subjective:  Patient is a 27 year old male presenting with rehab left ankle/foot hpi.                                      Pertinent medical history includes:  Asthma and smoking.    Other surgeries include:  Other (Hernia).    Current occupation is / Student.    Primary job tasks include:  Prolonged standing, lifting and repetitive tasks.              Oswestry Score: 54 %                 Objective:  System    Physical Exam    General     ROS    Assessment/Plan:

## 2018-05-03 ENCOUNTER — TELEPHONE (OUTPATIENT)
Dept: FAMILY MEDICINE | Facility: CLINIC | Age: 28
End: 2018-05-03

## 2018-05-03 DIAGNOSIS — J45.40 MODERATE PERSISTENT ASTHMA WITHOUT COMPLICATION: Primary | ICD-10-CM

## 2018-05-03 DIAGNOSIS — J45.40 MODERATE PERSISTENT ASTHMA, UNSPECIFIED WHETHER COMPLICATED: ICD-10-CM

## 2018-05-03 RX ORDER — ALBUTEROL SULFATE 0.83 MG/ML
SOLUTION RESPIRATORY (INHALATION)
Qty: 150 ML | Refills: 0 | Status: SHIPPED | OUTPATIENT
Start: 2018-05-03 | End: 2018-05-04

## 2018-05-03 NOTE — LETTER
May 7, 2018      Jada Lion  [address]  [facility] [place] [address]        Dear Jada,     As part of [facility]'s commitment to health and wellness we have reviewed your chart and it indicates that you are due for one or more of the following:    -- An Asthma Control Test. To ensure you are receiving the best care, it is important to stay current with healthcare visits. We recommend that you have an office visit every 6 months for your asthma and have an  Updated Asthma Control Test and Asthma Action Plan each year to help you stay in good control with your asthma.      We have enclosed the Asthma Control Test for you to fill out and mail back to us (no matter what your score is).  If your end score is 20 or less, please schedule an appointment to discuss your asthma score.    Please try to schedule and/or complete the tests above within the next 2-4 weeks.   The number to call to schedule an appointment at [facility] is [phone].    Thank you for choosing [facility] for your healthcare needs.      Sincerely,     Ariana Gonzalez PA-C / concha

## 2018-05-03 NOTE — TELEPHONE ENCOUNTER
"Requested Prescriptions   Pending Prescriptions Disp Refills     albuterol (PROAIR HFA/PROVENTIL HFA/VENTOLIN HFA) 108 (90 Base) MCG/ACT Inhaler    Last Written Prescription Date:  1/22/18  Last Fill Quantity: 1,  # refills: 0   Last Office Visit with [facility], [facility] or [facility] prescribing provider:  3/5/18   Future Office Visit:      1 Inhaler 0     Sig: Inhale 2 puffs into the lungs every 4 hours as needed for shortness of breath / dyspnea or wheezing    Asthma Maintenance Inhalers - Anticholinergics Failed    5/3/2018  1:44 PM       Failed - Asthma control assessment score within normal limits in last 6 months    Please review ACT score.          Passed - Patient is age 12 years or older       Passed - Recent (6 mo) or future (30 days) visit within the authorizing provider's specialty    Patient had office visit in the last 6 months or has a visit in the next 30 days with authorizing provider or within the authorizing provider's specialty.  See \"Patient Info\" tab in inbasket, or \"Choose Columns\" in Meds & Orders section of the refill encounter.                  Dale Faarax  [facility]  "

## 2018-05-03 NOTE — TELEPHONE ENCOUNTER
Please send a copy of the ACT to patient's home and then call him in 5 days to do ACT over phone.      If ACT > 20, no follow-up necessary.   If ACT < 20, I recommend he follow-up for a recheck on asthma to adjust medication.      Ariana Gonzalez PA-C

## 2018-05-03 NOTE — TELEPHONE ENCOUNTER
"Requested Prescriptions   Pending Prescriptions Disp Refills     albuterol (2.5 MG/3ML) 0.083% neb solution [Pharmacy Med Name: ALBUTEROL SUL 2.5 MG/3 ML SOLN] 150 mL 0    Last Written Prescription Date:  01/06/2017 #60 vi x 1  Last filled 01/16/2017  Last office visit: 3/5/2018 BRUNO Cain   Future Office Visit:  None   Sig: INHALE 1 VIAL VIA NEB EVERY 6 HOURS AS NEEDED FOR SHORTNESS OF BREATH/WHEEZING    Asthma Maintenance Inhalers - Anticholinergics Failed    5/3/2018 12:57 PM       Failed - Asthma control assessment score within normal limits in last 6 months    Please review ACT score.   ACT Total Scores 8/23/2017 1/3/2018 1/23/2018   ACT TOTAL SCORE - - -   ASTHMA ER VISITS - - -   ASTHMA HOSPITALIZATIONS - - -   ACT TOTAL SCORE (Goal Greater than or Equal to 20) 8 13 6   In the past 12 months, how many times did you visit the emergency room for your asthma without being admitted to the hospital? 1 0 0   In the past 12 months, how many times were you hospitalized overnight because of your asthma? 0 0 0              Passed - Patient is age 12 years or older       Passed - Recent (6 mo) or future (30 days) visit within the authorizing provider's specialty    Patient had office visit in the last 6 months or has a visit in the next 30 days with authorizing provider or within the authorizing provider's specialty.  See \"Patient Info\" tab in inbasket, or \"Choose Columns\" in Meds & Orders section of the refill encounter.              "

## 2018-05-04 DIAGNOSIS — J45.40 MODERATE PERSISTENT ASTHMA WITHOUT COMPLICATION: ICD-10-CM

## 2018-05-04 RX ORDER — ALBUTEROL SULFATE 0.83 MG/ML
SOLUTION RESPIRATORY (INHALATION)
Qty: 150 ML | Refills: 0 | Status: SHIPPED | OUTPATIENT
Start: 2018-05-04 | End: 2018-05-04

## 2018-05-04 RX ORDER — ALBUTEROL SULFATE 0.83 MG/ML
SOLUTION RESPIRATORY (INHALATION)
Qty: 150 ML | Refills: 0 | Status: SHIPPED | OUTPATIENT
Start: 2018-05-04 | End: 2018-05-09

## 2018-05-04 NOTE — TELEPHONE ENCOUNTER
"05/03/2018 was a failed transmission          Requested Prescriptions   Pending Prescriptions Disp Refills     albuterol (2.5 MG/3ML) 0.083% neb solution [Pharmacy Med Name: ALBUTEROL SUL 2.5 MG/3 ML SOLN] 150 mL 0    Last Written Prescription Date:  01/06/2017 #60vi x 1  Last filled 01/16/2017  Last office visit: 3/5/2018 ROXANA CARR   Future Office Visit:  None   Sig: INHALE 1 VIAL VIA NEB EVERY 6 HOURS AS NEEDED FOR SHORTNESS OF BREATH/WHEEZING    Asthma Maintenance Inhalers - Anticholinergics Failed    5/4/2018  3:00 PM       Failed - Asthma control assessment score within normal limits in last 6 months    Please review ACT score.   ACT Total Scores 8/23/2017 1/3/2018 1/23/2018   ACT TOTAL SCORE - - -   ASTHMA ER VISITS - - -   ASTHMA HOSPITALIZATIONS - - -   ACT TOTAL SCORE (Goal Greater than or Equal to 20) 8 13 6   In the past 12 months, how many times did you visit the emergency room for your asthma without being admitted to the hospital? 1 0 0   In the past 12 months, how many times were you hospitalized overnight because of your asthma? 0 0 0              Passed - Patient is age 12 years or older       Passed - Recent (6 mo) or future (30 days) visit within the authorizing provider's specialty    Patient had office visit in the last 6 months or has a visit in the next 30 days with authorizing provider or within the authorizing provider's specialty.  See \"Patient Info\" tab in inbasket, or \"Choose Columns\" in Meds & Orders section of the refill encounter.              "

## 2018-05-07 RX ORDER — ALBUTEROL SULFATE 90 UG/1
2 AEROSOL, METERED RESPIRATORY (INHALATION) EVERY 4 HOURS PRN
Qty: 1 INHALER | Refills: 0 | Status: SHIPPED | OUTPATIENT
Start: 2018-05-07 | End: 2018-05-09

## 2018-05-07 NOTE — TELEPHONE ENCOUNTER
Routing refill request to provider for review/approval because:  Protocol failed    Jacey Thomson RN

## 2018-05-09 ENCOUNTER — OFFICE VISIT (OUTPATIENT)
Dept: FAMILY MEDICINE | Facility: CLINIC | Age: 28
End: 2018-05-09
Payer: COMMERCIAL

## 2018-05-09 VITALS
DIASTOLIC BLOOD PRESSURE: 69 MMHG | SYSTOLIC BLOOD PRESSURE: 113 MMHG | BODY MASS INDEX: 27.64 KG/M2 | TEMPERATURE: 98 F | HEIGHT: 66 IN | WEIGHT: 172 LBS | HEART RATE: 56 BPM | OXYGEN SATURATION: 98 %

## 2018-05-09 DIAGNOSIS — F17.200 TOBACCO USE DISORDER: ICD-10-CM

## 2018-05-09 DIAGNOSIS — J45.40 MODERATE PERSISTENT ASTHMA WITHOUT COMPLICATION: Primary | ICD-10-CM

## 2018-05-09 PROCEDURE — 99214 OFFICE O/P EST MOD 30 MIN: CPT | Performed by: PREVENTIVE MEDICINE

## 2018-05-09 RX ORDER — ALBUTEROL SULFATE 0.83 MG/ML
SOLUTION RESPIRATORY (INHALATION)
Qty: 150 ML | Refills: 3 | Status: SHIPPED | OUTPATIENT
Start: 2018-05-09 | End: 2018-10-30

## 2018-05-09 RX ORDER — ALBUTEROL SULFATE 90 UG/1
2 AEROSOL, METERED RESPIRATORY (INHALATION) EVERY 4 HOURS PRN
Qty: 2 INHALER | Refills: 3 | Status: SHIPPED | OUTPATIENT
Start: 2018-05-09 | End: 2018-10-30

## 2018-05-09 ASSESSMENT — PAIN SCALES - GENERAL: PAINLEVEL: NO PAIN (0)

## 2018-05-09 NOTE — PROGRESS NOTES
SUBJECTIVE:   Jada Lion is a 27 year old male who presents to clinic today for the following health issues:      Asthma Follow-Up    Was ACT completed today?  Score today is 18  Yes    ACT Total Scores 1/23/2018   ACT TOTAL SCORE -   ASTHMA ER VISITS -   ASTHMA HOSPITALIZATIONS -   ACT TOTAL SCORE (Goal Greater than or Equal to 20) 6   In the past 12 months, how many times did you visit the emergency room for your asthma without being admitted to the hospital? 0   In the past 12 months, how many times were you hospitalized overnight because of your asthma? 0       Recent asthma triggers that patient is dealing with: animal dander        Amount of exercise or physical activity: 6-7 days/week for an average of greater than 60 minutes    Problems taking medications regularly: No    Medication side effects: none    Diet: regular (no restrictions)      Review of Care Everywhere indicates patient was seen in the emergency room for an asthma exacerbation on 3/8/18 and treated with Prednisone.    Tobacco use:  -smokes 2-3 cigarettes per day  -has cut down from before  -Motivated to quit all together       Problem list and histories reviewed & adjusted, as indicated.  Additional history: as documented    Patient Active Problem List   Diagnosis     Moderate persistent asthma     Seasonal allergic rhinitis     Plantar fasciitis     Overweight (BMI 25.0-29.9)     CARDIOVASCULAR SCREENING; LDL GOAL LESS THAN 160     Moderate persistent asthma without complication     Hip pain, left     Tobacco use disorder     Rotator cuff injury, right, initial encounter     Cheilitis     Dry eyes     Acute left-sided low back pain without sciatica     Past Surgical History:   Procedure Laterality Date     DENTAL SURGERY  1-2015    wisdom teeth        Social History   Substance Use Topics     Smoking status: Current Every Day Smoker     Packs/day: 0.50     Smokeless tobacco: Never Used     Alcohol use Yes     Family History   Problem  "Relation Age of Onset     DIABETES Mother      Hypertension Mother      Asthma Mother      Other Cancer Maternal Grandfather      Other Cancer Paternal Grandfather          Current Outpatient Prescriptions   Medication Sig Dispense Refill     albuterol (2.5 MG/3ML) 0.083% neb solution INHALE 1 VIAL VIA NEB EVERY 6 HOURS AS NEEDED FOR SHORTNESS OF BREATH/WHEEZING 150 mL 3     albuterol (PROAIR HFA/PROVENTIL HFA/VENTOLIN HFA) 108 (90 Base) MCG/ACT Inhaler Inhale 2 puffs into the lungs every 4 hours as needed for shortness of breath / dyspnea or wheezing 2 Inhaler 3     fluticasone-salmeterol (ADVAIR) 500-50 MCG/DOSE diskus inhaler Inhale 1 puff into the lungs 2 times daily 1 Inhaler 5     [DISCONTINUED] albuterol (2.5 MG/3ML) 0.083% neb solution INHALE 1 VIAL VIA NEB EVERY 6 HOURS AS NEEDED FOR SHORTNESS OF BREATH/WHEEZING 150 mL 0     [DISCONTINUED] albuterol (PROAIR HFA/PROVENTIL HFA/VENTOLIN HFA) 108 (90 Base) MCG/ACT Inhaler Inhale 2 puffs into the lungs every 4 hours as needed for shortness of breath / dyspnea or wheezing 1 Inhaler 0     [DISCONTINUED] fluticasone-salmeterol (ADVAIR) 500-50 MCG/DOSE diskus inhaler Inhale 1 puff into the lungs 2 times daily 1 Inhaler 3     Allergies   Allergen Reactions     Singulair [Montelukast]      Abdominal pain     BP Readings from Last 3 Encounters:   05/09/18 113/69   03/05/18 118/80   01/22/18 126/68    Wt Readings from Last 3 Encounters:   05/09/18 172 lb (78 kg)   03/05/18 167 lb (75.8 kg)   01/22/18 172 lb (78 kg)                  Labs reviewed in EPIC    Reviewed and updated as needed this visit by clinical staff  Allergies  Meds       Reviewed and updated as needed this visit by Provider         ROS:  Constitutional, HEENT, cardiovascular, pulmonary, gi and gu systems are negative, except as otherwise noted.    OBJECTIVE:                                                    /69  Pulse 56  Temp 98  F (36.7  C) (Oral)  Ht 5' 5.75\" (1.67 m)  Wt 172 lb (78 kg) "  SpO2 98%  BMI 27.97 kg/m2  Body mass index is 27.97 kg/(m^2).  GENERAL APPEARANCE: healthy, alert and no distress  EYES: Eyes grossly normal to inspection and conjunctivae and sclerae normal  HENT: ear canals and TM's normal and nose and mouth without ulcers or lesions  NECK: no adenopathy, no asymmetry, masses, or scars and thyroid normal to palpation  RESP: lungs clear to auscultation - no rales, rhonchi or wheezes  CV: regular rates and rhythm, normal S1 S2, no S3 or S4 and no murmur, click or rub  ABDOMEN: soft, non-tender  MS: extremities normal- no gross deformities noted  SKIN: no suspicious lesions or rashes  NEURO: Normal strength and tone, mentation intact and speech normal  PSYCH: mentation appears normal and affect normal/bright    Diagnostic test results:  Diagnostic Test Results:  No results found for this or any previous visit (from the past 24 hour(s)).     ASSESSMENT/PLAN:                                                    1. Moderate persistent asthma without complication  -ACT score of 18  -Refills on medication provided  -Stressed importance of daily steroid inhaler  -Albuterol is the rescue medication only   - albuterol (2.5 MG/3ML) 0.083% neb solution; INHALE 1 VIAL VIA NEB EVERY 6 HOURS AS NEEDED FOR SHORTNESS OF BREATH/WHEEZING  Dispense: 150 mL; Refill: 3  - albuterol (PROAIR HFA/PROVENTIL HFA/VENTOLIN HFA) 108 (90 Base) MCG/ACT Inhaler; Inhale 2 puffs into the lungs every 4 hours as needed for shortness of breath / dyspnea or wheezing  Dispense: 2 Inhaler; Refill: 3  - fluticasone-salmeterol (ADVAIR) 500-50 MCG/DOSE diskus inhaler; Inhale 1 puff into the lungs 2 times daily  Dispense: 1 Inhaler; Refill: 5    2. Tobacco use disorder  -Has been cutting down himself       Follow up with Provider - Recheck ACT in 4 weeks, if not at goal then refer to Pulmonary and add Bhakti Hernández MD MPH    [facility]

## 2018-05-09 NOTE — PATIENT INSTRUCTIONS
At [facility], we strive to deliver an exceptional experience to you, every time we see you.  If you receive a survey in the mail, please send us back your thoughts. We really do value your feedback.    Based on your medical history, these are the current health maintenance/preventive care services that you are due for (some may have been done at this visit.)  Health Maintenance Due   Topic Date Due     HIV SCREEN (SYSTEM ASSIGNED)  07/21/2010       Suggested websites for health information:  Www.[employer] Talk Network.org : Up to date and easily searchable information on multiple topics.  Www.medlineplus.gov : medication info, interactive tutorials, watch real surgeries online  Www.familydoctor.org : good info from the Academy of Family Physicians  Www.cdc.gov : public health info, travel advisories, epidemics (H1N1)  Www.aap.org : children's health info, normal development, vaccinations  Www.health.[facility].[place].us : [place] dept of health, public health issues in [place], N1N1    Your care team:                            Family Medicine Internal Medicine   MD Michael Ma MD Shantel Branch-Fleming, MD Katya Georgiev PA-C Megan Hill, APRN CNP    Destin Fischer MD Pediatrics   Marcel Feng, PAJerryC  Libertad Kraus, CNP MD Kira Jones APRN CNP   MD Krista Lawrence MD Deborah Mielke, MD Kim Thein, APRN CNP      Clinic hours: Monday - Thursday 7 am-7 pm; Fridays 7 am-5 pm.   Urgent care: Monday - Friday 11 am-9 pm; Saturday and Sunday 9 am-5 pm.  Pharmacy : Monday -Thursday 8 am-8 pm; Friday 8 am-6 pm; Saturday and Sunday 9 am-5 pm.     Clinic: [phone]   Pharmacy: [phone]

## 2018-05-10 ASSESSMENT — ASTHMA QUESTIONNAIRES: ACT_TOTALSCORE: 18

## 2018-05-14 ENCOUNTER — RADIANT APPOINTMENT (OUTPATIENT)
Dept: GENERAL RADIOLOGY | Facility: CLINIC | Age: 28
End: 2018-05-14
Attending: INTERNAL MEDICINE
Payer: COMMERCIAL

## 2018-05-14 ENCOUNTER — OFFICE VISIT (OUTPATIENT)
Dept: FAMILY MEDICINE | Facility: CLINIC | Age: 28
End: 2018-05-14
Payer: COMMERCIAL

## 2018-05-14 VITALS
DIASTOLIC BLOOD PRESSURE: 75 MMHG | OXYGEN SATURATION: 98 % | WEIGHT: 169 LBS | RESPIRATION RATE: 12 BRPM | BODY MASS INDEX: 27.16 KG/M2 | HEIGHT: 66 IN | HEART RATE: 52 BPM | TEMPERATURE: 98.3 F | SYSTOLIC BLOOD PRESSURE: 128 MMHG

## 2018-05-14 DIAGNOSIS — S86.111A STRAIN OF RIGHT GASTROCNEMIUS MUSCLE, INITIAL ENCOUNTER: ICD-10-CM

## 2018-05-14 DIAGNOSIS — M79.604 PAIN OF RIGHT LOWER EXTREMITY: ICD-10-CM

## 2018-05-14 DIAGNOSIS — J32.0 LEFT MAXILLARY SINUSITIS: Primary | ICD-10-CM

## 2018-05-14 DIAGNOSIS — J20.8 ACUTE BRONCHITIS DUE TO OTHER SPECIFIED ORGANISMS: ICD-10-CM

## 2018-05-14 PROCEDURE — 71046 X-RAY EXAM CHEST 2 VIEWS: CPT | Mod: FY

## 2018-05-14 PROCEDURE — 99214 OFFICE O/P EST MOD 30 MIN: CPT | Performed by: INTERNAL MEDICINE

## 2018-05-14 PROCEDURE — 70220 X-RAY EXAM OF SINUSES: CPT | Mod: FY

## 2018-05-14 PROCEDURE — 73590 X-RAY EXAM OF LOWER LEG: CPT | Mod: RT

## 2018-05-14 RX ORDER — AZITHROMYCIN 250 MG/1
TABLET, FILM COATED ORAL
Qty: 6 TABLET | Refills: 2 | Status: SHIPPED | OUTPATIENT
Start: 2018-05-14 | End: 2018-06-13

## 2018-05-14 RX ORDER — PREDNISONE 20 MG/1
40 TABLET ORAL DAILY
Qty: 10 TABLET | Refills: 1 | Status: SHIPPED | OUTPATIENT
Start: 2018-05-14 | End: 2018-05-19

## 2018-05-14 ASSESSMENT — PAIN SCALES - GENERAL: PAINLEVEL: MODERATE PAIN (5)

## 2018-05-14 NOTE — PATIENT INSTRUCTIONS
At [facility], we strive to deliver an exceptional experience to you, every time we see you.  If you receive a survey in the mail, please send us back your thoughts. We really do value your feedback.    Based on your medical history, these are the current health maintenance/preventive care services that you are due for (some may have been done at this visit.)  Health Maintenance Due   Topic Date Due     HIV SCREEN (SYSTEM ASSIGNED)  07/21/2010         Suggested websites for health information:  Www.Hydrelis.org : Up to date and easily searchable information on multiple topics.  Www.medlineplus.gov : medication info, interactive tutorials, watch real surgeries online  Www.familydoctor.org : good info from the Academy of Family Physicians  Www.cdc.gov : public health info, travel advisories, epidemics (H1N1)  Www.aap.org : children's health info, normal development, vaccinations  Www.health.[facility].[place].us : [place] dept of health, public health issues in [place], N1N1    Your care team:                            Family Medicine Internal Medicine   MD Michael Ma MD Shantel Branch-Fleming, MD Katya Georgiev PA-C Nam Ho, MD Pediatrics   CHAVA Corado, CNP Kira Galeano APRN CNP   MD Krista Lawrence MD Deborah Mielke, MD Kim Thein, APRN CNP      Clinic hours: Monday - Thursday 7 am-7 pm; Fridays 7 am-5 pm.   Urgent care: Monday - Friday 11 am-9 pm; Saturday and Sunday 9 am-5 pm.  Pharmacy : Monday -Thursday 8 am-8 pm; Friday 8 am-6 pm; Saturday and Sunday 9 am-5 pm.     Clinic: [phone]   Pharmacy: [phone]    PATIENT INSTRUCTIONS:    1) If the bruise behind your right leg is worse or if your right leg becomes swollen, call [phone] to schedule

## 2018-05-14 NOTE — MR AVS SNAPSHOT
After Visit Summary   5/14/2018    Jada Lion    MRN: [id number]           Patient Information     Date Of Birth          1990        Visit Information        Provider Department      5/14/2018 8:00 AM Michael Edwards MD [facility]        Today's Diagnoses     Left maxillary sinusitis    -  1    Acute bronchitis due to other specified organisms        Pain of right lower extremity        Strain of right gastrocnemius muscle, initial encounter          Care Instructions    At [facility], we strive to deliver an exceptional experience to you, every time we see you.  If you receive a survey in the mail, please send us back your thoughts. We really do value your feedback.    Based on your medical history, these are the current health maintenance/preventive care services that you are due for (some may have been done at this visit.)  Health Maintenance Due   Topic Date Due     HIV SCREEN (SYSTEM ASSIGNED)  07/21/2010         Suggested websites for health information:  Www.Trovali.Spacious App : Up to date and easily searchable information on multiple topics.  Www.medlineplus.gov : medication info, interactive tutorials, watch real surgeries online  Www.familydoctor.org : good info from the Academy of Family Physicians  Www.cdc.gov : public health info, travel advisories, epidemics (H1N1)  Www.aap.org : children's health info, normal development, vaccinations  Www.health.state.[place].us : [place] dept of health, public health issues in [place], N1N1    Your care team:                            Family Medicine Internal Medicine   MD Michael Ma MD Shantel Branch-Fleming, MD Katya Georgiev PA-C Nam Ho, MD Pediatrics   CHAVA Corado, MD Krista Velasquez CNP, MD Deborah Mielke, MD Kim Thein, RADHIKA CNP      Clinic hours: Monday - Thursday 7 am-7 pm; Fridays 7 am-5 pm.   Urgent care:  Monday - Friday 11 am-9 pm; Saturday and Sunday 9 am-5 pm.  Pharmacy : Monday -Thursday 8 am-8 pm; Friday 8 am-6 pm; Saturday and Sunday 9 am-5 pm.     Clinic: [phone]   Pharmacy: [phone]    PATIENT INSTRUCTIONS:    1) If the bruise behind your right leg is worse or if your right leg becomes swollen, call [phone] to schedule MR.          Follow-ups after your visit        Future tests that were ordered for you today     Open Future Orders        Priority Expected Expires Ordered    MR Tibia Fibula Right w/o Contrast Routine  5/14/2019 5/14/2018            Who to contact     If you have questions or need follow up information about today's clinic visit or your schedule please contact [facility] directly at [phone].  Normal or non-critical lab and imaging results will be communicated to you by MyChart, letter or phone within 4 business days after the clinic has received the results. If you do not hear from us within 7 days, please contact the clinic through [employer] or phone. If you have a critical or abnormal lab result, we will notify you by phone as soon as possible.  Submit refill requests through [employer] or call your pharmacy and they will forward the refill request to us. Please allow 3 business days for your refill to be completed.          Additional Information About Your Visit        [employer] Information     [employer] gives you secure access to your electronic health record. If you see a primary care provider, you can also send messages to your care team and make appointments. If you have questions, please call your primary care clinic.  If you do not have a primary care provider, please call [phone] and they will assist you.        Care EveryWhere ID     This is your Care EveryWhere ID. This could be used by other organizations to access your [facility] medical records  [id number]        Your Vitals Were     Pulse Temperature Respirations Height Pulse Oximetry  "BMI (Body Mass Index)    52 98.3  F (36.8  C) (Oral) 12 5' 5.75\" (1.67 m) 98% 27.49 kg/m2       Blood Pressure from Last 3 Encounters:   05/14/18 128/75   05/09/18 113/69   03/05/18 118/80    Weight from Last 3 Encounters:   05/14/18 169 lb (76.7 kg)   05/09/18 172 lb (78 kg)   03/05/18 167 lb (75.8 kg)              We Performed the Following     XR Chest 2 Views     XR Sinus Complete G/E 3 Views     XR Tibia & Fibula Right 2 Views          Today's Medication Changes          These changes are accurate as of 5/14/18  9:00 AM.  If you have any questions, ask your nurse or doctor.               Start taking these medicines.        Dose/Directions    azithromycin 250 MG tablet   Commonly known as:  ZITHROMAX   Used for:  Acute bronchitis due to other specified organisms, Left maxillary sinusitis   Started by:  Michael Edwards MD        Two tablets first day, then one tablet daily for four days.   Quantity:  6 tablet   Refills:  2       predniSONE 20 MG tablet   Commonly known as:  DELTASONE   Used for:  Left maxillary sinusitis, Acute bronchitis due to other specified organisms   Started by:  Michael Edwards MD        Dose:  40 mg   Take 2 tablets (40 mg) by mouth daily for 5 days   Quantity:  10 tablet   Refills:  1            Where to get your medicines      These medications were sent to [facility] - [place], [place] - [address]  [address], [facility] [address]     Phone:  [phone]     azithromycin 250 MG tablet    predniSONE 20 MG tablet                Primary Care Provider Office Phone # Fax #    Ariana Gonzalez PA-C [phone] [address] [place] [address]        Equal Access to Services     RACHEL NICOLE AH: Khadijah Cantrell, waasiada luqadaha, qaybta kaalmada adeegyada, clover rees. [facility] [phone].    ATENCIÓN: Si habla español, tiene a antunez disposición servicios gratuitos de " asistencia lingüística. Joey al [phone].    We comply with applicable federal civil rights laws and [place] laws. We do not discriminate on the basis of race, color, national origin, age, disability, sex, sexual orientation, or gender identity.            Thank you!     Thank you for choosing [facility]  for your care. Our goal is always to provide you with excellent care. Hearing back from our patients is one way we can continue to improve our services. Please take a few minutes to complete the written survey that you may receive in the mail after your visit with us. Thank you!             Your Updated Medication List - Protect others around you: Learn how to safely use, store and throw away your medicines at www.disposemymeds.org.          This list is accurate as of 5/14/18  9:00 AM.  Always use your most recent med list.                   Brand Name Dispense Instructions for use Diagnosis    * albuterol (2.5 MG/3ML) 0.083% neb solution     150 mL    INHALE 1 VIAL VIA NEB EVERY 6 HOURS AS NEEDED FOR SHORTNESS OF BREATH/WHEEZING    Moderate persistent asthma without complication       * albuterol 108 (90 Base) MCG/ACT Inhaler    PROAIR HFA/PROVENTIL HFA/VENTOLIN HFA    2 Inhaler    Inhale 2 puffs into the lungs every 4 hours as needed for shortness of breath / dyspnea or wheezing    Moderate persistent asthma without complication       azithromycin 250 MG tablet    ZITHROMAX    6 tablet    Two tablets first day, then one tablet daily for four days.    Acute bronchitis due to other specified organisms, Left maxillary sinusitis       fluticasone-salmeterol 500-50 MCG/DOSE diskus inhaler    ADVAIR    1 Inhaler    Inhale 1 puff into the lungs 2 times daily    Moderate persistent asthma without complication       predniSONE 20 MG tablet    DELTASONE    10 tablet    Take 2 tablets (40 mg) by mouth daily for 5 days    Left maxillary sinusitis, Acute bronchitis due to other specified organisms        * Notice:  This list has 2 medication(s) that are the same as other medications prescribed for you. Read the directions carefully, and ask your doctor or other care provider to review them with you.

## 2018-05-14 NOTE — PROGRESS NOTES
SUBJECTIVE:   Jada Lion is a 27 year old male who presents to clinic today for the following health issues:    Cough      Duration: acute    Description (location/character/radiation): Postnasal drip cough, worse at night, associated with mucoid phlegm.    Intensity:  Moderate    Accompanying signs and symptoms: Denies any fever, chills, hemoptysis or pleuritic pain.    History (similar episodes/previous evaluation): Seen last 5/9/18. Started on ADVAIR.    Precipitating or alleviating factors: Denies any seasonal allergies but patient is allergic to animal dander.    Therapies tried and outcome: Albuterol inhalers & nebulizers plus ADVAIR (no effect).     Right leg pain      Duration: 5/12/2018    Description (location/character/radiation): Pain behind right leg after playing volleyball.    Intensity:  Moderate    Accompanying signs and symptoms: Contusion behind right knee.    History (similar episodes/previous evaluation): None    Precipitating or alleviating factors: Playing volleyball.    Therapies tried and outcome: Advil (not effective).     Problem list and histories reviewed & adjusted, as indicated.  Additional history: as documented    Patient Active Problem List   Diagnosis     Moderate persistent asthma     Seasonal allergic rhinitis     Plantar fasciitis     Overweight (BMI 25.0-29.9)     CARDIOVASCULAR SCREENING; LDL GOAL LESS THAN 160     Moderate persistent asthma without complication     Hip pain, left     Tobacco use disorder     Rotator cuff injury, right, initial encounter     Cheilitis     Dry eyes     Acute left-sided low back pain without sciatica     Past Surgical History:   Procedure Laterality Date     DENTAL SURGERY  1-2015    wisdom teeth        Social History   Substance Use Topics     Smoking status: Current Every Day Smoker     Packs/day: 0.50     Smokeless tobacco: Never Used     Alcohol use Yes     Family History   Problem Relation Age of Onset     DIABETES Mother       "Hypertension Mother      Asthma Mother      Other Cancer Maternal Grandfather      Other Cancer Paternal Grandfather          Current Outpatient Prescriptions   Medication Sig Dispense Refill     albuterol (2.5 MG/3ML) 0.083% neb solution INHALE 1 VIAL VIA NEB EVERY 6 HOURS AS NEEDED FOR SHORTNESS OF BREATH/WHEEZING 150 mL 3     albuterol (PROAIR HFA/PROVENTIL HFA/VENTOLIN HFA) 108 (90 Base) MCG/ACT Inhaler Inhale 2 puffs into the lungs every 4 hours as needed for shortness of breath / dyspnea or wheezing 2 Inhaler 3     fluticasone-salmeterol (ADVAIR) 500-50 MCG/DOSE diskus inhaler Inhale 1 puff into the lungs 2 times daily 1 Inhaler 5     Allergies   Allergen Reactions     Singulair [Montelukast]      Abdominal pain     No lab results found.   BP Readings from Last 3 Encounters:   05/14/18 128/75   05/09/18 113/69   03/05/18 118/80    Wt Readings from Last 3 Encounters:   05/14/18 169 lb (76.7 kg)   05/09/18 172 lb (78 kg)   03/05/18 167 lb (75.8 kg)              ROS:  CONSTITUTIONAL: NEGATIVE for fever, chills, change in weight  INTEGUMENTARY/SKIN: NEGATIVE for worrisome rashes, moles or lesions  EYES: NEGATIVE for vision changes or irritation  ENT/MOUTH: NEGATIVE for epistaxis  RESP:NEGATIVE for hemoptysis, pleurisy and wheezing  CV: NEGATIVE for chest pain, palpitations or peripheral edema  GI: NEGATIVE for nausea, abdominal pain, heartburn, or change in bowel habits  : NEGATIVE for frequency, dysuria, or hematuria  MUSCULOSKELETAL:As above.  NEURO: NEGATIVE for weakness, dizziness or paresthesias  ENDOCRINE: NEGATIVE for temperature intolerance, skin/hair changes  HEME: NEGATIVE for bleeding problems  PSYCHIATRIC: NEGATIVE for changes in mood or affect    OBJECTIVE:     /75 (BP Location: Left arm, Patient Position: Chair, Cuff Size: Adult Regular)  Pulse 52  Temp 98.3  F (36.8  C) (Oral)  Resp 12  Ht 5' 5.75\" (1.67 m)  Wt 169 lb (76.7 kg)  SpO2 98%  BMI 27.49 kg/m2  Body mass index is 27.49 " kg/(m^2).  GENERAL: healthy, alert and no distress  EYES: Eyes grossly normal to inspection and conjunctivae and sclerae normal  HENT: normal cephalic/atraumatic, nasal mucosa edematous , oral mucous membranes moist and tonsillar hypertrophy  NECK: no adenopathy  RESP: lungs clear to auscultation - no rales, rhonchi or wheezes  CV: regular rate and rhythm, normal S1 S2, no S3 or S4, no murmur, click or rub, no peripheral edema and peripheral pulses strong  MS: Tenderness on palpation of proximal aspect of right gastrocnemius muscle.  SKIN: Minimal contusion noted at at right proximal posterior leg.  NEURO: Normal strength and tone, mentation intact and speech normal  PSYCH: mentation appears normal, affect normal/bright    Diagnostic Test Results:  Pending.    ASSESSMENT/PLAN:     (J32.0) Left maxillary sinusitis  (primary encounter diagnosis)  Comment: X-rays reviewed by this provider.  Plan: XR Sinus Complete G/E 3 Views, azithromycin         (ZITHROMAX) 250 MG tablet, predniSONE         (DELTASONE) 20 MG tablet            (J20.8) Acute bronchitis due to other specified organisms  Comment: No evidence of pneumonia.  Plan: XR Chest 2 Views, azithromycin (ZITHROMAX) 250         MG tablet, predniSONE (DELTASONE) 20 MG tablet            (M79.604) Pain of right lower extremity  Comment: No evidence of fractures.  Plan: XR Tibia & Fibula Right 2 Views            (S86.111A) Strain of right gastrocnemius muscle, initial encounter  Comment: Most probable cause of right posterior leg pain.  Plan: MR Tibia Fibula Right w/o Contrast          Follow-up visit if condition worsens.      Michael Edwards MD  [facility]

## 2018-05-15 ASSESSMENT — ASTHMA QUESTIONNAIRES: ACT_TOTALSCORE: 6

## 2018-05-17 NOTE — TELEPHONE ENCOUNTER
Left message for patient to return call to clinic. Need to check ACT score. Was mailed out 5/7/18.  Deya Cadet CMA([facility])

## 2018-06-07 ENCOUNTER — TELEPHONE (OUTPATIENT)
Dept: FAMILY MEDICINE | Facility: CLINIC | Age: 28
End: 2018-06-07

## 2018-06-07 NOTE — TELEPHONE ENCOUNTER
This writer attempted to contact patient on 06/07/18      Reason for call ACT and left message.      If patient calls back:  Patient contacted by 2nd floor [facility] Team (MA/TC). Inform patient that someone from the team will contact them, document that pt called and route to care team.         Dalila Guillen MA

## 2018-06-13 ENCOUNTER — OFFICE VISIT (OUTPATIENT)
Dept: FAMILY MEDICINE | Facility: CLINIC | Age: 28
End: 2018-06-13
Payer: COMMERCIAL

## 2018-06-13 VITALS
HEIGHT: 66 IN | SYSTOLIC BLOOD PRESSURE: 108 MMHG | HEART RATE: 70 BPM | BODY MASS INDEX: 27.16 KG/M2 | OXYGEN SATURATION: 97 % | WEIGHT: 169 LBS | DIASTOLIC BLOOD PRESSURE: 66 MMHG | TEMPERATURE: 98.4 F

## 2018-06-13 DIAGNOSIS — J45.41 MODERATE PERSISTENT ASTHMA WITH EXACERBATION: Primary | ICD-10-CM

## 2018-06-13 DIAGNOSIS — J06.9 UPPER RESPIRATORY TRACT INFECTION, UNSPECIFIED TYPE: ICD-10-CM

## 2018-06-13 PROCEDURE — 99214 OFFICE O/P EST MOD 30 MIN: CPT | Performed by: FAMILY MEDICINE

## 2018-06-13 RX ORDER — PREDNISONE 20 MG/1
40 TABLET ORAL DAILY
Qty: 10 TABLET | Refills: 0 | Status: SHIPPED | OUTPATIENT
Start: 2018-06-13 | End: 2018-06-18

## 2018-06-13 ASSESSMENT — PAIN SCALES - GENERAL: PAINLEVEL: NO PAIN (0)

## 2018-06-13 NOTE — LETTER
[address] [address]  Phone: [phone]    June 13, 2018        Jada Lion  [address]  [facility] [place] [address]          To whom it may concern:    RE: Jada Lion    Patient was seen and treated today at our clinic and missed work since 6/11/2018.  Patient may return to work 6/14/2018 with the following:  No working or lifting restrictions    Please contact me for questions or concerns.      Sincerely,        Destin Fischer MD

## 2018-06-13 NOTE — PATIENT INSTRUCTIONS
At [facility], we strive to deliver an exceptional experience to you, every time we see you.  If you receive a survey in the mail, please send us back your thoughts. We really do value your feedback.    Based on your medical history, these are the current health maintenance/preventive care services that you are due for (some may have been done at this visit.)  Health Maintenance Due   Topic Date Due     HIV SCREEN (SYSTEM ASSIGNED)  07/21/2010         Suggested websites for health information:  Www.[employer].org : Up to date and easily searchable information on multiple topics.  Www.medlineplus.gov : medication info, interactive tutorials, watch real surgeries online  Www.familydoctor.org : good info from the Academy of Family Physicians  Www.cdc.gov : public health info, travel advisories, epidemics (H1N1)  Www.aap.org : children's health info, normal development, vaccinations  Www.health.[facility].[place].us : [place] dept of health, public health issues in [place], N1N1    Your care team:                            Family Medicine Internal Medicine   MD Michael Ma MD Shantel Branch-Fleming, MD Katya Georgiev PA-C Nam Ho, MD Pediatrics   CHAVA Corado, CNP Kira Galeano APRN CNP   MD Krista Lawrence MD Deborah Mielke, MD Kim Thein, APRN CNP      Clinic hours: Monday - Thursday 7 am-7 pm; Fridays 7 am-5 pm.   Urgent care: Monday - Friday 11 am-9 pm; Saturday and Sunday 9 am-5 pm.  Pharmacy : Monday -Thursday 8 am-8 pm; Friday 8 am-6 pm; Saturday and Sunday 9 am-5 pm.     Clinic: [phone]   Pharmacy: [phone]

## 2018-06-13 NOTE — MR AVS SNAPSHOT
After Visit Summary   6/13/2018    Jada Lion    MRN: [id number]           Patient Information     Date Of Birth          1990        Visit Information        Provider Department      6/13/2018 8:00 AM Destin Fischer MD [facility]        Today's Diagnoses     Moderate persistent asthma with exacerbation    -  1    Upper respiratory tract infection, unspecified type          Care Instructions    At [facility], we strive to deliver an exceptional experience to you, every time we see you.  If you receive a survey in the mail, please send us back your thoughts. We really do value your feedback.    Based on your medical history, these are the current health maintenance/preventive care services that you are due for (some may have been done at this visit.)  Health Maintenance Due   Topic Date Due     HIV SCREEN (SYSTEM ASSIGNED)  07/21/2010         Suggested websites for health information:  Www.[employer] : Up to date and easily searchable information on multiple topics.  Www.[employer].gov : medication info, interactive tutorials, watch real surgeries online  Www.familydoctor.org : good info from the Academy of Family Physicians  Www.cdc.gov : public health info, travel advisories, epidemics (H1N1)  Www.aap.org : children's health info, normal development, vaccinations  Www.health.state.[place].us : [place] dept of health, public health issues in [place], N1N1    Your care team:                            Family Medicine Internal Medicine   MD Michael Ma MD Shantel Branch-Fleming, MD Katya Georgiev PA-C Nam Ho, MD Pediatrics   CHAVA Corado, MD Krista Velasquez CNP, MD Deborah Mielke, MD Kim Thein, APRN CNP      Clinic hours: Monday - Thursday 7 am-7 pm; Fridays 7 am-5 pm.   Urgent care: Monday - Friday 11 am-9 pm; Saturday and Sunday 9 am-5 pm.  Pharmacy : Monday -Thursday 8  "am-8 pm; Friday 8 am-6 pm; Saturday and  9 am-5 pm.     Clinic: [phone]   Pharmacy: [phone]            Follow-ups after your visit        Who to contact     If you have questions or need follow up information about today's clinic visit or your schedule please contact [facility] [place] directly at [phone].  Normal or non-critical lab and imaging results will be communicated to you by CloudOnhart, letter or phone within 4 business days after the clinic has received the results. If you do not hear from us within 7 days, please contact the clinic through Wiret or phone. If you have a critical or abnormal lab result, we will notify you by phone as soon as possible.  Submit refill requests through MulliganPlus or call your pharmacy and they will forward the refill request to us. Please allow 3 business days for your refill to be completed.          Additional Information About Your Visit        [employer] Information     MulliganPlus lets you send messages to your doctor, view your test results, renew your prescriptions, schedule appointments and more. To sign up, go to [email or web address] . Click on \"Log in\" on the left side of the screen, which will take you to the Welcome page. Then click on \"Sign up Now\" on the right side of the page.     You will be asked to enter the access code listed below, as well as some personal information. Please follow the directions to create your username and password.     Your access code is: [email or web address]  Expires: 2018  8:01 AM     Your access code will  in 90 days. If you need help or a new code, please call your [place] clinic or [phone].        Care EveryWhere ID     This is your Care EveryWhere ID. This could be used by other organizations to access your [place] medical records  [id number]        Your Vitals Were     Pulse Temperature Height Pulse Oximetry BMI (Body Mass Index)       70 98.4  F (36.9  C) (Oral) 5' 5.75\" (1.67 m) 97% " 27.49 kg/m2        Blood Pressure from Last 3 Encounters:   06/13/18 108/66   05/14/18 128/75   05/09/18 113/69    Weight from Last 3 Encounters:   06/13/18 169 lb (76.7 kg)   05/14/18 169 lb (76.7 kg)   05/09/18 172 lb (78 kg)              Today, you had the following     No orders found for display         Today's Medication Changes          These changes are accurate as of 6/13/18  8:01 AM.  If you have any questions, ask your nurse or doctor.               Start taking these medicines.        Dose/Directions    predniSONE 20 MG tablet   Commonly known as:  DELTASONE   Used for:  Moderate persistent asthma with exacerbation   Started by:  Destin Fischer MD        Dose:  40 mg   Take 2 tablets (40 mg) by mouth daily for 5 days   Quantity:  10 tablet   Refills:  0            Where to get your medicines      These medications were sent to [id number] - [place] [facility], [place] - [address]  [address] [place] [facility] [address]     Phone:  [phone]     predniSONE 20 MG tablet                Primary Care Provider Office Phone # Fax #    Ariana Cate Gonzalez PA-C [phone] [address] [place] [address]        Equal Access to Services     RACHEL NICOLE AH: Hadii bebeto ku hadasho Soomaali, waaxda luqadaha, qaybta kaalmada adeegyada, waxay idiin haydanyell rees. So [facility] [phone].    ATENCIÓN: Si habla español, tiene a antunez disposición servicios gratuitos de asistencia lingüística. [facility] [phone].    We comply with applicable federal civil rights laws and [place] laws. We do not discriminate on the basis of race, color, national origin, age, disability, sex, sexual orientation, or gender identity.            Thank you!     Thank you for choosing [facility]  for your care. Our goal is always to provide you with excellent care. Hearing back from our patients is one way we can continue to improve our services. Please take a few  minutes to complete the written survey that you may receive in the mail after your visit with us. Thank you!             Your Updated Medication List - Protect others around you: Learn how to safely use, store and throw away your medicines at www.disposemymeds.org.          This list is accurate as of 6/13/18  8:01 AM.  Always use your most recent med list.                   Brand Name Dispense Instructions for use Diagnosis    * albuterol (2.5 MG/3ML) 0.083% neb solution     150 mL    INHALE 1 VIAL VIA NEB EVERY 6 HOURS AS NEEDED FOR SHORTNESS OF BREATH/WHEEZING    Moderate persistent asthma without complication       * albuterol 108 (90 Base) MCG/ACT Inhaler    PROAIR HFA/PROVENTIL HFA/VENTOLIN HFA    2 Inhaler    Inhale 2 puffs into the lungs every 4 hours as needed for shortness of breath / dyspnea or wheezing    Moderate persistent asthma without complication       fluticasone-salmeterol 500-50 MCG/DOSE diskus inhaler    ADVAIR    1 Inhaler    Inhale 1 puff into the lungs 2 times daily    Moderate persistent asthma without complication       predniSONE 20 MG tablet    DELTASONE    10 tablet    Take 2 tablets (40 mg) by mouth daily for 5 days    Moderate persistent asthma with exacerbation       * Notice:  This list has 2 medication(s) that are the same as other medications prescribed for you. Read the directions carefully, and ask your doctor or other care provider to review them with you.

## 2018-06-13 NOTE — PROGRESS NOTES
SUBJECTIVE:   Jada Lion is a 27 year old male who presents to clinic today for the following health issues:      Acute Illness   Acute illness concerns: COUGH  Onset: 3-4 days    Fever: YES    Chills/Sweats: YES    Headache (location?): no    Sinus Pressure:YES    Conjunctivitis:  no    Ear Pain: YES: bilateral    Rhinorrhea: no    Congestion: no     Sore Throat: YES     Cough: YES    Wheeze: no     Decreased Appetite: YES    Nausea: no    Vomiting: YES- from coughing     Diarrhea:  YES    Dysuria/Freq.: no    Fatigue/Achiness: YES    Sick/Strep Exposure: no     Therapies Tried and outcome: inhaler, neb treatment- no relief      Problem list and histories reviewed & adjusted, as indicated.  Additional history: as documented    Patient Active Problem List   Diagnosis     Moderate persistent asthma     Seasonal allergic rhinitis     Plantar fasciitis     Overweight (BMI 25.0-29.9)     CARDIOVASCULAR SCREENING; LDL GOAL LESS THAN 160     Moderate persistent asthma without complication     Hip pain, left     Tobacco use disorder     Rotator cuff injury, right, initial encounter     Cheilitis     Dry eyes     Acute left-sided low back pain without sciatica     Past Surgical History:   Procedure Laterality Date     DENTAL SURGERY  1-2015    wisdom teeth        Social History   Substance Use Topics     Smoking status: Current Every Day Smoker     Packs/day: 0.25     Smokeless tobacco: Never Used     Alcohol use Yes     Family History   Problem Relation Age of Onset     DIABETES Mother      Hypertension Mother      Asthma Mother      Other Cancer Maternal Grandfather      Other Cancer Paternal Grandfather            Reviewed and updated as needed this visit by clinical staff  Tobacco  Allergies  Meds  Med Hx  Surg Hx  Fam Hx  Soc Hx      Reviewed and updated as needed this visit by Provider         ROS:  Constitutional, HEENT, cardiovascular, pulmonary, GI, , musculoskeletal, neuro, skin, endocrine and psych  "systems are negative, except as otherwise noted.    OBJECTIVE:     /66 (BP Location: Left arm, Patient Position: Chair, Cuff Size: Adult Large)  Pulse 70  Temp 98.4  F (36.9  C) (Oral)  Ht 5' 5.75\" (1.67 m)  Wt 169 lb (76.7 kg)  SpO2 97%  BMI 27.49 kg/m2  Body mass index is 27.49 kg/(m^2).  GENERAL: healthy, alert and no distress  NECK: no adenopathy, no asymmetry, masses, or scars and thyroid normal to palpation  RESP: some wheezing  CV: regular rate and rhythm, normal S1 S2, no S3 or S4, no murmur, click or rub, no peripheral edema and peripheral pulses strong  ABDOMEN: soft, nontender, no hepatosplenomegaly, no masses and bowel sounds normal  MS: no gross musculoskeletal defects noted, no edema    Diagnostic Test Results:  none     ASSESSMENT/PLAN:     1. Moderate persistent asthma with exacerbation  Oral steroid burst given. Continue with current asthma inhalers. RTC if not improving.  - predniSONE (DELTASONE) 20 MG tablet; Take 2 tablets (40 mg) by mouth daily for 5 days  Dispense: 10 tablet; Refill: 0    2. Upper respiratory tract infection, unspecified type  Discussed length of illness (3-10 days for normal people and may be longer for smokers)  Discussed modes of transmission (hands, droplets, surfaces) and ways to prevent spreading (washing hands, using arm for coughing/sneezing, cleaning surfaces.)  Treatment is symptomatic treatment.  Discussed possible ways for treatment:  Rhinorrhea (nasal discharge)/sneezing   -ipratropium nasal 0.06% spray   -1st generation antihistamine: diphenhydramine   -intranasal cromolyn sodium  Cough   -dextromethorphan   -dextromethorphan plus albuterol inhaler   -codeine  Fever/Sore throat   -ibuprofen and/or tylenol prn  Nasal congestion   -pseudoephedrine   -phenylephrine   -Oxymetazoline 0.05% (Afrin)     -not to use more than 5 days due to risk for rebounding  Expectorants   -guaifenesin  Prophylaxis against URI for people exposed to vigorous activities in " extreme cold conditions   -vitamin C 200-500mg daily  Please see Epic orders.  Push fluids.  Discussed signs or symptoms that would indicate need for recheck.  Patient agrees with plan.      See Patient Instructions    Destin Fischer MD, MD  [facility]

## 2018-06-21 NOTE — TELEPHONE ENCOUNTER
This writer attempted to contact pt on 06/21/18      Reason for call ACT and left message.      If patient calls back:   Patient contacted by 2nd floor [facility] Team (MA/TC). Inform patient that someone from the team will contact them, document that pt called and route to care team.         Amelia Lewis

## 2018-07-03 NOTE — TELEPHONE ENCOUNTER
Patient have ACT score of 16. Per patient he uses his inhaler once a day. Routing to the provider to advise.  Annabelle Ravi MA 7/3/2018

## 2018-07-04 ASSESSMENT — ASTHMA QUESTIONNAIRES: ACT_TOTALSCORE: 16

## 2018-08-03 ENCOUNTER — TRANSFERRED RECORDS (OUTPATIENT)
Dept: HEALTH INFORMATION MANAGEMENT | Facility: CLINIC | Age: 28
End: 2018-08-03

## 2018-08-03 ENCOUNTER — TELEPHONE (OUTPATIENT)
Dept: FAMILY MEDICINE | Facility: CLINIC | Age: 28
End: 2018-08-03

## 2018-08-03 NOTE — TELEPHONE ENCOUNTER
Patient  returned call    Best number to reach caller: Home number on file [phone] (home)    Is it ok to leave a detailed message: YES

## 2018-08-03 NOTE — TELEPHONE ENCOUNTER
Reason for call:  Patient reporting a symptom    Symptom or request: Pain in scrotum/ near surgery he had 8 years ago.    Duration (how long have symptoms been present): It just started today    Additional comments: Pt is  patient and is in pain. There are no openings today for Dr.Malik browne but I told him about Janet bedollain, he said he is going to come in. But would like to speak to a nurse as well.     Phone Number patient can be reached at:  Home number on file [phone] (home)    Best Time:  anytime    Can we leave a detailed message on this number:  NO    Call taken on 8/3/2018 at 1:02 PM by Jakub Cervantes

## 2018-08-03 NOTE — TELEPHONE ENCOUNTER
This writer attempted to contact [facility] on 08/03/18      Reason for call triage and left message.      If patient calls back:   Patient contacted by a Registered Nurse. Inform patient that someone from the RN group will contact them, document that pt called and route to P DYAD 3 RN POOL [[phone]]    Radha Crowder, RN

## 2018-08-06 NOTE — TELEPHONE ENCOUNTER
Patient was seen in ED on 8/3/2018 for this issue. Closing this encounter.    Sydney Evans RN, BSN

## 2018-08-13 ENCOUNTER — TELEPHONE (OUTPATIENT)
Dept: FAMILY MEDICINE | Facility: CLINIC | Age: 28
End: 2018-08-13

## 2018-08-13 NOTE — TELEPHONE ENCOUNTER
Panel Management Review      Patient has the following on his problem list:     Asthma review     ACT Total Scores 7/3/2018   ACT TOTAL SCORE -   ASTHMA ER VISITS -   ASTHMA HOSPITALIZATIONS -   ACT TOTAL SCORE (Goal Greater than or Equal to 20) 16   In the past 12 months, how many times did you visit the emergency room for your asthma without being admitted to the hospital? 0   In the past 12 months, how many times were you hospitalized overnight because of your asthma? 0      1. Is Asthma diagnosis on the Problem List? Yes    2. Is Asthma listed on Health Maintenance? Yes    3. Patient is due for:  ACT      Composite cancer screening  Chart review shows that this patient is due/due soon for the following None  Summary:    Patient is due/failing the following:   ACT    Action needed:   Patient needs to do ACT.    Type of outreach:    Copy of ACT mailed to patient, will reach out in 5 days.    Questions for provider review:    None                                                                                                                                    Deanne Salmon CMA on 8/13/2018 at 1:11 PM       Chart routed to none .

## 2018-08-13 NOTE — LETTER
August 13, 2018      Jada Lion  [address]  [facility] [place] [address]        Dear Jada,     As part of [facility]'s commitment to health and wellness we have reviewed your chart and it indicates that you are due for one or more of the following:    -- An Asthma Control Test. To ensure you are receiving the best care, it is important to stay current with healthcare visits. We recommend that you have an office visit every 6 months for your asthma and have an  Updated Asthma Control Test and Asthma Action Plan each year to help you stay in good control with your asthma.      We have enclosed the Asthma Control Test for you to fill out and mail back to us (no matter what your score is).  If your end score is 20 or less, please schedule an appointment to discuss your asthma score.        Please try to schedule and/or complete the tests above within the next 2-4 weeks.   The number to call to schedule an appointment at [facility] is [phone].    While we work hard to maintain accurate records, it is always possible that this notice does not accurately reflect tests that you may have had. To ensure that we do not send you unnecessary notices please verify that we have accurate dates of your tests, even if these were done many years ago.     Thank you for choosing [facility] for your healthcare needs.      Sincerely,     RICARDO Sandoval/nl

## 2018-08-27 ENCOUNTER — OFFICE VISIT (OUTPATIENT)
Dept: FAMILY MEDICINE | Facility: CLINIC | Age: 28
End: 2018-08-27
Payer: COMMERCIAL

## 2018-08-27 VITALS
SYSTOLIC BLOOD PRESSURE: 119 MMHG | HEIGHT: 66 IN | HEART RATE: 56 BPM | BODY MASS INDEX: 27.64 KG/M2 | RESPIRATION RATE: 14 BRPM | DIASTOLIC BLOOD PRESSURE: 74 MMHG | TEMPERATURE: 97.6 F | WEIGHT: 172 LBS | OXYGEN SATURATION: 98 %

## 2018-08-27 DIAGNOSIS — R35.0 URINARY FREQUENCY: ICD-10-CM

## 2018-08-27 DIAGNOSIS — R19.7 DIARRHEA, UNSPECIFIED TYPE: ICD-10-CM

## 2018-08-27 DIAGNOSIS — R19.7 DIARRHEA, UNSPECIFIED TYPE: Primary | ICD-10-CM

## 2018-08-27 LAB
ERYTHROCYTE [DISTWIDTH] IN BLOOD BY AUTOMATED COUNT: 12.9 % (ref 10–15)
HCT VFR BLD AUTO: 46.7 % (ref 40–53)
HGB BLD-MCNC: 16 G/DL (ref 13.3–17.7)
MCH RBC QN AUTO: 31.7 PG (ref 26.5–33)
MCHC RBC AUTO-ENTMCNC: 34.3 G/DL (ref 31.5–36.5)
MCV RBC AUTO: 93 FL (ref 78–100)
PLATELET # BLD AUTO: 175 10E9/L (ref 150–450)
RBC # BLD AUTO: 5.04 10E12/L (ref 4.4–5.9)
WBC # BLD AUTO: 5.8 10E9/L (ref 4–11)

## 2018-08-27 PROCEDURE — 87491 CHLMYD TRACH DNA AMP PROBE: CPT | Performed by: PHYSICIAN ASSISTANT

## 2018-08-27 PROCEDURE — 87591 N.GONORRHOEAE DNA AMP PROB: CPT | Performed by: PHYSICIAN ASSISTANT

## 2018-08-27 PROCEDURE — 87338 HPYLORI STOOL AG IA: CPT | Performed by: PHYSICIAN ASSISTANT

## 2018-08-27 PROCEDURE — 87209 SMEAR COMPLEX STAIN: CPT | Performed by: PHYSICIAN ASSISTANT

## 2018-08-27 PROCEDURE — 99214 OFFICE O/P EST MOD 30 MIN: CPT | Performed by: PHYSICIAN ASSISTANT

## 2018-08-27 PROCEDURE — 87493 C DIFF AMPLIFIED PROBE: CPT | Mod: 59 | Performed by: PHYSICIAN ASSISTANT

## 2018-08-27 PROCEDURE — 36415 COLL VENOUS BLD VENIPUNCTURE: CPT | Performed by: PHYSICIAN ASSISTANT

## 2018-08-27 PROCEDURE — 85027 COMPLETE CBC AUTOMATED: CPT | Performed by: PHYSICIAN ASSISTANT

## 2018-08-27 PROCEDURE — 81001 URINALYSIS AUTO W/SCOPE: CPT | Performed by: PHYSICIAN ASSISTANT

## 2018-08-27 PROCEDURE — 87177 OVA AND PARASITES SMEARS: CPT | Performed by: PHYSICIAN ASSISTANT

## 2018-08-27 PROCEDURE — 87506 IADNA-DNA/RNA PROBE TQ 6-11: CPT | Performed by: PHYSICIAN ASSISTANT

## 2018-08-27 ASSESSMENT — PAIN SCALES - GENERAL: PAINLEVEL: NO PAIN (0)

## 2018-08-27 NOTE — PATIENT INSTRUCTIONS
Leave stool samples   Start taking probiotics   Then may use Imodium 4 mg first dose and then 2 mg after each bout of diarrhea for up to 5 days   Eat more stool forming foods like white rice, toast, pasta   Drink more water   Diarrhea with Uncertain Cause (Adult)    Diarrhea is when stools are loose and watery. This can be caused by:    Viral infections    Bacterial infections    Food poisoning    Parasites    Irritable bowel syndrome (IBS)    Inflammatory bowel diseases such as ulcerative colitis, Crohn's disease, and celiac disease    Food intolerance, such as to lactose, the sugar found in milk and milk products    Reaction to medicines like antibiotics, laxatives, cancer drugs, and antacids  Along with diarrhea, you may also have:    Abdominal pain and cramping    Nausea and vomiting    Loss of bowel control    Fever and chills    Bloody stools  In some cases, antibiotics may help to treat diarrhea. You may have a stool sample test. This is done to see what is causing your diarrhea, and if antibiotics will help treat it. The results of a stool sample test may take up to 2 days. The healthcare provider may not give you antibiotics until he or she has the stool test results.  Diarrhea can cause dehydration. This is the loss of too much water and other fluids from the body. When this occurs, body fluid must be replaced. This can be done with oral rehydration solutions. Oral rehydration solutions are available at drugstores and grocery stores without a prescription.  Home care  Follow all instructions given by your healthcare provider. Rest at home for the next 24 hours, or until you feel better. Avoid caffeine, tobacco, and alcohol. These can make diarrhea, cramping, and pain worse.  If taking medicines:    Don t take over-the-counter diarrhea or nausea medicines unless your healthcare provider tells you to.    You may use acetaminophen or NSAID medicines like ibuprofen or naproxen to reduce pain and fever. Don t  use these if you have chronic liver or kidney disease, or ever had a stomach ulcer or gastrointestinal bleeding. Don't use NSAID medicines if you are already taking one for another condition (like arthritis) or are on daily aspirin therapy (such as for heart disease or after a stroke). Talk with your healthcare provider first.    If antibiotics were prescribed, be sure you take them until they are finished. Don t stop taking them even when you feel better. Antibiotics must be taken as a full course.  To prevent the spread of illness:    Remember that washing with soap and water and using alcohol-based  is the best way to prevent the spread of infection.    Clean the toilet after each use.    Wash your hands before eating.    Wash your hands before and after preparing food. Keep in mind that people with diarrhea or vomiting should not prepare food for others.    Wash your hands after using cutting boards, countertops, and knives that have been in contact with raw foods.    Wash and then peel fruits and vegetables.    Keep uncooked meats away from cooked and ready-to-eat foods.    Use a food thermometer when cooking. Cook poultry to at least 165 F (74 C). Cook ground meat (beef, veal, pork, lamb) to at least 160 F (71 C). Cook fresh beef, veal, lamb, and pork to at least 145 F (63 C).    Don t eat raw or undercooked eggs (poached or sofia side up), poultry, meat, or unpasteurized milk and juices.  Food and drinks  The main goal while treating vomiting or diarrhea is to prevent dehydration. This is done by taking small amounts of liquids often.    Keep in mind that liquids are more important than food right now.    Drink only small amounts of liquids at a time.    Don t force yourself to eat, especially if you are having cramping, vomiting, or diarrhea. Don t eat large amounts at a time, even if you are hungry.    If you eat, avoid fatty, greasy, spicy, or fried foods.    Don t eat dairy foods or drink milk if  you have diarrhea. These can make diarrhea worse.  During the first 24 hours you can try:    Oral rehydration solutions. Do not use sports drinks. They have too much sugar and not enough electrolytes.    Soft drinks without caffeine    Ginger ale    Water (plain or flavored)    Decaf tea or coffee    Clear broth, consommé, or bouillon    Gelatin, popsicles, or frozen fruit juice bars  The second 24 hours, if you are feeling better, you can add:    Hot cereal, plain toast, bread, rolls, or crackers    Plain noodles, rice, mashed potatoes, chicken noodle soup, or rice soup    Unsweetened canned fruit (no pineapple)    Bananas  As you recover:    Limit fat intake to less than 15 grams per day. Don t eat margarine, butter, oils, mayonnaise, sauces, gravies, fried foods, peanut butter, meat, poultry, or fish.    Limit fiber. Don t eat raw or cooked vegetables, fresh fruits except bananas, or bran cereals.    Limit caffeine and chocolate.    Limit dairy.    Don t use spices or seasonings except salt.    Go back to your normal diet over time, as you feel better and your symptoms improve.    If the symptoms come back, go back to a simple diet or clear liquids.  Follow-up care  Follow up with your healthcare provider, or as advised. If a stool sample was taken or cultures were done, call the healthcare provider for the results as instructed.  Call 911  Call 911 if you have any of these symptoms:    Trouble breathing    Confusion    Extreme drowsiness or trouble walking    Loss of consciousness    Rapid heart rate    Chest pain    Stiff neck    Seizure  When to seek medical advice  Call your healthcare provider right away if any of these occur:    Abdominal pain that gets worse    Constant lower right abdominal pain    Continued vomiting and inability to keep liquids down    Diarrhea more than 5 times a day    Blood in vomit or stool    Dark urine or no urine for 8 hours, dry mouth and tongue, tiredness, weakness, or  dizziness    Drowsiness    New rash    You don t get better in 2 to 3 days    Fever of 100.4 F (38 C) or higher, or as directed by your healthcare provider  Date Last Reviewed: 1/3/2016    9145-8118 [employer]. [address] [place] [address], [place] [address]. All rights reserved. This information is not intended as a substitute for professional medical care. Always follow your healthcare professional's instructions.

## 2018-08-27 NOTE — MR AVS SNAPSHOT
After Visit Summary   8/27/2018    Jada Lion    MRN: [id number]           Patient Information     Date Of Birth          1990        Visit Information        Provider Department      8/27/2018 11:40 AM Kalyani Estrada PA-C [facility]        Today's Diagnoses     Diarrhea, unspecified type    -  1    Urinary frequency          Care Instructions    Leave stool samples   Start taking probiotics   Then may use Imodium 4 mg first dose and then 2 mg after each bout of diarrhea for up to 5 days   Eat more stool forming foods like white rice, toast, pasta   Drink more water   Diarrhea with Uncertain Cause (Adult)    Diarrhea is when stools are loose and watery. This can be caused by:    Viral infections    Bacterial infections    Food poisoning    Parasites    Irritable bowel syndrome (IBS)    Inflammatory bowel diseases such as ulcerative colitis, Crohn's disease, and celiac disease    Food intolerance, such as to lactose, the sugar found in milk and milk products    Reaction to medicines like antibiotics, laxatives, cancer drugs, and antacids  Along with diarrhea, you may also have:    Abdominal pain and cramping    Nausea and vomiting    Loss of bowel control    Fever and chills    Bloody stools  In some cases, antibiotics may help to treat diarrhea. You may have a stool sample test. This is done to see what is causing your diarrhea, and if antibiotics will help treat it. The results of a stool sample test may take up to 2 days. The healthcare provider may not give you antibiotics until he or she has the stool test results.  Diarrhea can cause dehydration. This is the loss of too much water and other fluids from the body. When this occurs, body fluid must be replaced. This can be done with oral rehydration solutions. Oral rehydration solutions are available at drugstores and grocery stores without a prescription.  Home care  Follow all instructions given by your  healthcare provider. Rest at home for the next 24 hours, or until you feel better. Avoid caffeine, tobacco, and alcohol. These can make diarrhea, cramping, and pain worse.  If taking medicines:    Don t take over-the-counter diarrhea or nausea medicines unless your healthcare provider tells you to.    You may use acetaminophen or NSAID medicines like ibuprofen or naproxen to reduce pain and fever. Don t use these if you have chronic liver or kidney disease, or ever had a stomach ulcer or gastrointestinal bleeding. Don't use NSAID medicines if you are already taking one for another condition (like arthritis) or are on daily aspirin therapy (such as for heart disease or after a stroke). Talk with your healthcare provider first.    If antibiotics were prescribed, be sure you take them until they are finished. Don t stop taking them even when you feel better. Antibiotics must be taken as a full course.  To prevent the spread of illness:    Remember that washing with soap and water and using alcohol-based  is the best way to prevent the spread of infection.    Clean the toilet after each use.    Wash your hands before eating.    Wash your hands before and after preparing food. Keep in mind that people with diarrhea or vomiting should not prepare food for others.    Wash your hands after using cutting boards, countertops, and knives that have been in contact with raw foods.    Wash and then peel fruits and vegetables.    Keep uncooked meats away from cooked and ready-to-eat foods.    Use a food thermometer when cooking. Cook poultry to at least 165 F (74 C). Cook ground meat (beef, veal, pork, lamb) to at least 160 F (71 C). Cook fresh beef, veal, lamb, and pork to at least 145 F (63 C).    Don t eat raw or undercooked eggs (poached or sofia side up), poultry, meat, or unpasteurized milk and juices.  Food and drinks  The main goal while treating vomiting or diarrhea is to prevent dehydration. This is done by  taking small amounts of liquids often.    Keep in mind that liquids are more important than food right now.    Drink only small amounts of liquids at a time.    Don t force yourself to eat, especially if you are having cramping, vomiting, or diarrhea. Don t eat large amounts at a time, even if you are hungry.    If you eat, avoid fatty, greasy, spicy, or fried foods.    Don t eat dairy foods or drink milk if you have diarrhea. These can make diarrhea worse.  During the first 24 hours you can try:    Oral rehydration solutions. Do not use sports drinks. They have too much sugar and not enough electrolytes.    Soft drinks without caffeine    Ginger ale    Water (plain or flavored)    Decaf tea or coffee    Clear broth, consommé, or bouillon    Gelatin, popsicles, or frozen fruit juice bars  The second 24 hours, if you are feeling better, you can add:    Hot cereal, plain toast, bread, rolls, or crackers    Plain noodles, rice, mashed potatoes, chicken noodle soup, or rice soup    Unsweetened canned fruit (no pineapple)    Bananas  As you recover:    Limit fat intake to less than 15 grams per day. Don t eat margarine, butter, oils, mayonnaise, sauces, gravies, fried foods, peanut butter, meat, poultry, or fish.    Limit fiber. Don t eat raw or cooked vegetables, fresh fruits except bananas, or bran cereals.    Limit caffeine and chocolate.    Limit dairy.    Don t use spices or seasonings except salt.    Go back to your normal diet over time, as you feel better and your symptoms improve.    If the symptoms come back, go back to a simple diet or clear liquids.  Follow-up care  Follow up with your healthcare provider, or as advised. If a stool sample was taken or cultures were done, call the healthcare provider for the results as instructed.  Call 911  Call 911 if you have any of these symptoms:    Trouble breathing    Confusion    Extreme drowsiness or trouble walking    Loss of consciousness    Rapid heart  rate    Chest pain    Stiff neck    Seizure  When to seek medical advice  Call your healthcare provider right away if any of these occur:    Abdominal pain that gets worse    Constant lower right abdominal pain    Continued vomiting and inability to keep liquids down    Diarrhea more than 5 times a day    Blood in vomit or stool    Dark urine or no urine for 8 hours, dry mouth and tongue, tiredness, weakness, or dizziness    Drowsiness    New rash    You don t get better in 2 to 3 days    Fever of 100.4 F (38 C) or higher, or as directed by your healthcare provider  Date Last Reviewed: 1/3/2016    6224-9167 [employer]. [address] [place], [place] [address]. All rights reserved. This information is not intended as a substitute for professional medical care. Always follow your healthcare professional's instructions.                Follow-ups after your visit        Future tests that were ordered for you today     Open Future Orders        Priority Expected Expires Ordered    H Pylori antigen, stool Routine  9/26/2018 8/27/2018    Clostridium difficile Toxin B PCR Routine  9/26/2018 8/27/2018    Ova and Parasite Exam Routine Routine  8/27/2019 8/27/2018    Enteric Bacteria and Virus Panel by [facility] Stool Routine  8/27/2019 8/27/2018            Who to contact     If you have questions or need follow up information about today's clinic visit or your schedule please contact [facility] directly at [phone].  Normal or non-critical lab and imaging results will be communicated to you by MyChart, letter or phone within 4 business days after the clinic has received the results. If you do not hear from us within 7 days, please contact the clinic through MyChart or phone. If you have a critical or abnormal lab result, we will notify you by phone as soon as possible.  Submit refill requests through [employer] or call your pharmacy and they will forward the refill request to us. Please allow 3  "business days for your refill to be completed.          Additional Information About Your Visit        Care EveryWhere ID     This is your Care EveryWhere ID. This could be used by other organizations to access your [facility] medical records  [id number]        Your Vitals Were     Pulse Temperature Respirations Height Pulse Oximetry BMI (Body Mass Index)    56 97.6  F (36.4  C) (Oral) 14 5' 5.75\" (1.67 m) 98% 27.97 kg/m2       Blood Pressure from Last 3 Encounters:   08/27/18 119/74   06/13/18 108/66   05/14/18 128/75    Weight from Last 3 Encounters:   08/27/18 172 lb (78 kg)   06/13/18 169 lb (76.7 kg)   05/14/18 169 lb (76.7 kg)              We Performed the Following     CBC with platelets     CHLAMYDIA TRACHOMATIS PCR     NEISSERIA GONORRHOEA PCR     UA with Microscopic        Primary Care Provider Office Phone # Fax #    Destin Fischer [phone]       [address] [facility] [address]        Equal Access to Services     [facility]: Hadii aad ku hadasho Soomaali, waaxda luqadaha, qaybta kaalmada adeegyada, waxay idiin haydanyell constantino . So [facility] [phone].    ATENCIÓN: Si habla español, tiene a antunez disposición servicios gratuitos de asistencia lingüística. Llame al [phone].    We comply with applicable federal civil rights laws and [place] laws. We do not discriminate on the basis of race, color, national origin, age, disability, sex, sexual orientation, or gender identity.            Thank you!     Thank you for choosing [facility]  for your care. Our goal is always to provide you with excellent care. Hearing back from our patients is one way we can continue to improve our services. Please take a few minutes to complete the written survey that you may receive in the mail after your visit with us. Thank you!             Your Updated Medication List - Protect others around you: Learn how to safely use, store and throw away your medicines at " www.disposemymeds.org.          This list is accurate as of 8/27/18 12:04 PM.  Always use your most recent med list.                   Brand Name Dispense Instructions for use Diagnosis    * albuterol (2.5 MG/3ML) 0.083% neb solution     150 mL    INHALE 1 VIAL VIA NEB EVERY 6 HOURS AS NEEDED FOR SHORTNESS OF BREATH/WHEEZING    Moderate persistent asthma without complication       * albuterol 108 (90 Base) MCG/ACT inhaler    PROAIR HFA/PROVENTIL HFA/VENTOLIN HFA    2 Inhaler    Inhale 2 puffs into the lungs every 4 hours as needed for shortness of breath / dyspnea or wheezing    Moderate persistent asthma without complication       fluticasone-salmeterol 500-50 MCG/DOSE diskus inhaler    ADVAIR    1 Inhaler    Inhale 1 puff into the lungs 2 times daily    Moderate persistent asthma without complication       * Notice:  This list has 2 medication(s) that are the same as other medications prescribed for you. Read the directions carefully, and ask your doctor or other care provider to review them with you.

## 2018-08-27 NOTE — LETTER
August 31, 2018      Jada Lion  [address] [facility] [place] [address]        Dear ,    We are writing to inform you of your test results.    STDs are negative    Resulted Orders   UA with Microscopic   Result Value Ref Range    Color Urine Yellow     Appearance Urine Clear     Glucose Urine Negative NEG^Negative mg/dL    Bilirubin Urine Negative NEG^Negative    Ketones Urine Negative NEG^Negative mg/dL    Specific Gravity Urine >1.030 1.003 - 1.035    pH Urine 5.0 5.0 - 7.0 pH    Protein Albumin Urine Negative NEG^Negative mg/dL    Urobilinogen Urine 0.2 0.2 - 1.0 EU/dL    Nitrite Urine Negative NEG^Negative    Blood Urine Negative NEG^Negative    Leukocyte Esterase Urine Negative NEG^Negative    Source Midstream Urine     WBC Urine 0 - 5 OTO5^0 - 5 /HPF    RBC Urine O - 2 OTO2^O - 2 /HPF    Squamous Epithelial /LPF Urine Few FEW^Few /LPF    Bacteria Urine Few (A) NEG^Negative /HPF   CBC with platelets   Result Value Ref Range    WBC 5.8 4.0 - 11.0 10e9/L    RBC Count 5.04 4.4 - 5.9 10e12/L    Hemoglobin 16.0 13.3 - 17.7 g/dL    Hematocrit 46.7 40.0 - 53.0 %    MCV 93 78 - 100 fl    MCH 31.7 26.5 - 33.0 pg    MCHC 34.3 31.5 - 36.5 g/dL    RDW 12.9 10.0 - 15.0 %    Platelet Count 175 150 - 450 10e9/L   NEISSERIA GONORRHOEA PCR   Result Value Ref Range    Specimen Descrip Urine     N Gonorrhea PCR Negative NEG^Negative      Comment:      Negative for N. gonorrhoeae rRNA by transcription mediated amplification.  A negative result by transcription mediated amplification does not preclude   the presence of N. gonorrhoeae infection because results are dependent on   proper and adequate collection, absence of inhibitors, and sufficient rRNA to   be detected.     CHLAMYDIA TRACHOMATIS PCR   Result Value Ref Range    Specimen Description Urine     Chlamydia Trachomatis PCR Negative NEG^Negative      Comment:      Negative for C. trachomatis rRNA by transcription mediated amplification.  A negative result by  transcription mediated amplification does not preclude   the presence of C. trachomatis infection because results are dependent on   proper and adequate collection, absence of inhibitors, and sufficient rRNA to   be detected.         If you have any questions or concerns, please call the clinic at the number listed above.       Sincerely,        Kalyani Estrada PA-C

## 2018-08-27 NOTE — PROGRESS NOTES
SUBJECTIVE:   Jada Lion is a 28 year old male who presents to clinic today for the following health issues:    Diarrhea  Onset: 1 month     Description:   Consistency of stool: watery, yellow and green  Blood in stool: no   Number of loose stools in past 24 hours: More then 10. Patient stated he is going 3-4 times a day, mostly happens at home or work, but when he is out  With friends out playing basketball, he has no problems with diarrhea     Progression of Symptoms:  worsening    Accompanying Signs & Symptoms:  Fever: no   Nausea or vomiting; no   Abdominal pain: no   Episodes of constipation: no   Weight loss: no   Urination Frequency: YES peeing more Often     History:   Ill contacts: no   Recent use of antibiotics: no    Recent travels: no          Recent medication-new or changes(Rx or OTC): no     Therapies Tried and outcome:  PeptoBismol and a tablet pt cant recall OTC; Outcome: temporary improvement         Problem list and histories reviewed & adjusted, as indicated.  Additional history: as documented    Patient Active Problem List   Diagnosis     Moderate persistent asthma     Seasonal allergic rhinitis     Plantar fasciitis     Overweight (BMI 25.0-29.9)     CARDIOVASCULAR SCREENING; LDL GOAL LESS THAN 160     Moderate persistent asthma without complication     Hip pain, left     Tobacco use disorder     Rotator cuff injury, right, initial encounter     Cheilitis     Dry eyes     Acute left-sided low back pain without sciatica     Past Surgical History:   Procedure Laterality Date     DENTAL SURGERY  1-2015    wisdom teeth        Social History   Substance Use Topics     Smoking status: Current Every Day Smoker     Packs/day: 0.25     Smokeless tobacco: Never Used     Alcohol use Yes     Family History   Problem Relation Age of Onset     Diabetes Mother      Hypertension Mother      Asthma Mother      Other Cancer Maternal Grandfather      Other Cancer Paternal Grandfather          Current  "Outpatient Prescriptions   Medication Sig Dispense Refill     albuterol (2.5 MG/3ML) 0.083% neb solution INHALE 1 VIAL VIA NEB EVERY 6 HOURS AS NEEDED FOR SHORTNESS OF BREATH/WHEEZING 150 mL 3     albuterol (PROAIR HFA/PROVENTIL HFA/VENTOLIN HFA) 108 (90 Base) MCG/ACT Inhaler Inhale 2 puffs into the lungs every 4 hours as needed for shortness of breath / dyspnea or wheezing 2 Inhaler 3     fluticasone-salmeterol (ADVAIR) 500-50 MCG/DOSE diskus inhaler Inhale 1 puff into the lungs 2 times daily 1 Inhaler 5     Allergies   Allergen Reactions     Singulair [Montelukast]      Abdominal pain       Reviewed and updated as needed this visit by clinical staff  Tobacco  Allergies  Meds  Problems  Med Hx  Surg Hx  Fam Hx  Soc Hx        Reviewed and updated as needed this visit by Provider  Allergies  Meds  Problems         ROS:  Constitutional, HEENT, cardiovascular, pulmonary, GI, , musculoskeletal, neuro, skin, endocrine and psych systems are negative, except as otherwise noted.    OBJECTIVE:     /74 (BP Location: Right arm, Patient Position: Sitting, Cuff Size: Adult Large)  Pulse 56  Temp 97.6  F (36.4  C) (Oral)  Resp 14  Ht 5' 5.75\" (1.67 m)  Wt 172 lb (78 kg)  SpO2 98%  BMI 27.97 kg/m2  Body mass index is 27.97 kg/(m^2).  GENERAL: healthy, alert and no distress  NECK: no adenopathy, no asymmetry, masses, or scars and thyroid normal to palpation  RESP: lungs clear to auscultation - no rales, rhonchi or wheezes  CV: regular rate and rhythm, normal S1 S2, no S3 or S4, no murmur, click or rub, no peripheral edema and peripheral pulses strong  ABDOMEN: soft, nontender, no hepatosplenomegaly, no masses and bowel sounds normal  MS: no gross musculoskeletal defects noted, no edema    Diagnostic Test Results:  Results for orders placed or performed in visit on 08/27/18 (from the past 24 hour(s))   CBC with platelets   Result Value Ref Range    WBC 5.8 4.0 - 11.0 10e9/L    RBC Count 5.04 4.4 - 5.9 " 10e12/L    Hemoglobin 16.0 13.3 - 17.7 g/dL    Hematocrit 46.7 40.0 - 53.0 %    MCV 93 78 - 100 fl    MCH 31.7 26.5 - 33.0 pg    MCHC 34.3 31.5 - 36.5 g/dL    RDW 12.9 10.0 - 15.0 %    Platelet Count 175 150 - 450 10e9/L   UA with Microscopic   Result Value Ref Range    Color Urine Yellow     Appearance Urine Clear     Glucose Urine Negative NEG^Negative mg/dL    Bilirubin Urine Negative NEG^Negative    Ketones Urine Negative NEG^Negative mg/dL    Specific Gravity Urine >1.030 1.003 - 1.035    pH Urine 5.0 5.0 - 7.0 pH    Protein Albumin Urine Negative NEG^Negative mg/dL    Urobilinogen Urine 0.2 0.2 - 1.0 EU/dL    Nitrite Urine Negative NEG^Negative    Blood Urine Negative NEG^Negative    Leukocyte Esterase Urine Negative NEG^Negative    Source Midstream Urine     WBC Urine PENDING OTO5^0 - 5 /HPF    RBC Urine PENDING OTO2^O - 2 /HPF       ASSESSMENT/PLAN:       ICD-10-CM    1. Diarrhea, unspecified type R19.7 Enteric Bacteria and Virus Panel by [facility] Stool     Ova and Parasite Exam Routine     Clostridium difficile Toxin B PCR     CBC with platelets     H Pylori antigen, stool   2. Urinary frequency R35.0 UA with Microscopic     NEISSERIA GONORRHOEA PCR     CHLAMYDIA TRACHOMATIS PCR     1. Unknown cause at this point.   Patient has no pain or discomfort, so it appears to be either infectious or food related  Leave stool samples   Stop eating spicy food   Start taking probiotics   Then may use Imodium 4 mg first dose and then 2 mg after each bout of diarrhea for up to 5 days and no more than 5 doses in 24 hours   Eat more stool forming foods like white rice, toast, pasta   Drink more water   2. urinalysis is within normal limits, no infection or blood.     Kalyani Estrada PA-C  [facility]

## 2018-08-27 NOTE — LETTER
August 31, 2018      Jada Lion  [address]  [facility] [place] [address]        Dear ,    We are writing to inform you of your test results.    Stool tests are negative  for infection or parasites. If diarrhea does not improve in 2 weeks after changing diet to avoid spicy, sour or raw foods, then follow up in clinic.     Resulted Orders   Enteric Bacteria and Virus Panel by [facility] Stool   Result Value Ref Range    Campylobacter group by [facility] Not Detected NDET^Not Detected    Salmonella species by [facility] Not Detected NDET^Not Detected    Shigella species by [facility] Not Detected NDET^Not Detected    Vibrio group by [facility] Not Detected NDET^Not Detected    Rotavirus A by [facility] Not Detected NDET^Not Detected    Shiga toxin 1 gene by [facility] Not Detected NDET^Not Detected    Shiga toxin 2 gene by [facility] Not Detected NDET^Not Detected    Norovirus I and II by [facility] Not Detected NDET^Not Detected    Yersinia enterocolitica by [facility] Not Detected NDET^Not Detected    Enteric pathogen comment       Testing performed by multiplexed, qualitative PCR using the [employer] Enteric   Pathogens Nucleic Acid Test. Results should not be used as the sole basis for diagnosis,   treatment, or other patient management decisions.        Comment:      Positive results do not rule out co-infection with other organisms that are   not detected by this test, and may not be the sole or definitive cause of   patient illness.   Negative results in the setting of clinical illness compatible with   gastroenteritis may be due to infection by pathogens that are not detected by   this test or non-infectious causes such as ulcerative colitis, irritable bowel   syndrome, or Crohn's disease.   Note: Shiga toxin producing E. coli (STEC) typically harbor one or both genes   that encode for Shiga toxins 1 and 2.     Ova and Parasite Exam Routine   Result Value Ref Range    Specimen Description Feces     Ova and Parasite Exam Routine parasitology exam negative      Ova and Parasite Exam       Cryptosporidium, Cyclospora, and Microsporidia are not readily detected by this method. A   single negative specimen does not rule out parasitic infection.     Clostridium difficile Toxin B PCR   Result Value Ref Range    Specimen Description Feces     C Diff Toxin B PCR Canceled, Test credited (A) NEG^Negative      Comment:      Test canceled - Lab  error   H Pylori antigen, stool   Result Value Ref Range    Specimen Description Feces     H Pylori Antigen       Negative for Helicobacter pylori antigen by enzyme immunoassay. A negative result   indicates the absence of H. pylori antigen or that the level of antigen is below the level   of detection.     Clostridium difficile toxin B PCR   Result Value Ref Range    Specimen Description Feces     C Diff Toxin B PCR Negative NEG^Negative      Comment:      Negative: Clostridium difficile target DNA sequences NOT detected, presumed   negative for Clostridium difficile toxin B or the number of bacteria present   may be below the limit of detection for the test.  FDA approved assay performed using [employer] Gene[employer]pert real-time PCR.  A negative result does not exclude actual disease due to Clostridium difficile   and may be due to improper collection, handling and storage of the specimen   or the number of organisms in the specimen is below the detection limit of the   assay.         If you have any questions or concerns, please call the clinic at the number listed above.       Sincerely,        Tracey Estrada PA-C

## 2018-08-28 LAB
ALBUMIN UR-MCNC: NEGATIVE MG/DL
APPEARANCE UR: CLEAR
BACTERIA #/AREA URNS HPF: ABNORMAL /HPF
BILIRUB UR QL STRIP: NEGATIVE
C COLI+JEJUNI+LARI FUSA STL QL NAA+PROBE: NOT DETECTED
C DIFF TOX B STL QL: ABNORMAL
C DIFF TOX B STL QL: NEGATIVE
C TRACH DNA SPEC QL NAA+PROBE: NEGATIVE
COLOR UR AUTO: YELLOW
EC STX1 GENE STL QL NAA+PROBE: NOT DETECTED
EC STX2 GENE STL QL NAA+PROBE: NOT DETECTED
ENTERIC PATHOGEN COMMENT: NORMAL
GLUCOSE UR STRIP-MCNC: NEGATIVE MG/DL
H PYLORI AG STL QL IA: NORMAL
HGB UR QL STRIP: NEGATIVE
KETONES UR STRIP-MCNC: NEGATIVE MG/DL
LEUKOCYTE ESTERASE UR QL STRIP: NEGATIVE
N GONORRHOEA DNA SPEC QL NAA+PROBE: NEGATIVE
NITRATE UR QL: NEGATIVE
NON-SQ EPI CELLS #/AREA URNS LPF: ABNORMAL /LPF
NOROV GI+II ORF1-ORF2 JNC STL QL NAA+PR: NOT DETECTED
O+P STL MICRO: NORMAL
O+P STL MICRO: NORMAL
PH UR STRIP: 5 PH (ref 5–7)
RBC #/AREA URNS AUTO: ABNORMAL /HPF
RVA NSP5 STL QL NAA+PROBE: NOT DETECTED
SALMONELLA SP RPOD STL QL NAA+PROBE: NOT DETECTED
SHIGELLA SP+EIEC IPAH STL QL NAA+PROBE: NOT DETECTED
SOURCE: ABNORMAL
SP GR UR STRIP: >1.03 (ref 1–1.03)
SPECIMEN SOURCE: ABNORMAL
SPECIMEN SOURCE: NORMAL
UROBILINOGEN UR STRIP-ACNC: 0.2 EU/DL (ref 0.2–1)
V CHOL+PARA RFBL+TRKH+TNAA STL QL NAA+PR: NOT DETECTED
WBC #/AREA URNS AUTO: ABNORMAL /HPF
Y ENTERO RECN STL QL NAA+PROBE: NOT DETECTED

## 2018-08-28 ASSESSMENT — ASTHMA QUESTIONNAIRES: ACT_TOTALSCORE: 18

## 2018-10-02 ENCOUNTER — MEDICAL CORRESPONDENCE (OUTPATIENT)
Dept: HEALTH INFORMATION MANAGEMENT | Facility: CLINIC | Age: 28
End: 2018-10-02

## 2018-10-02 ENCOUNTER — TRANSFERRED RECORDS (OUTPATIENT)
Dept: HEALTH INFORMATION MANAGEMENT | Facility: CLINIC | Age: 28
End: 2018-10-02

## 2018-10-30 ENCOUNTER — OFFICE VISIT (OUTPATIENT)
Dept: FAMILY MEDICINE | Facility: CLINIC | Age: 28
End: 2018-10-30
Payer: COMMERCIAL

## 2018-10-30 VITALS
OXYGEN SATURATION: 95 % | HEART RATE: 63 BPM | WEIGHT: 179 LBS | TEMPERATURE: 97.8 F | BODY MASS INDEX: 29.11 KG/M2 | DIASTOLIC BLOOD PRESSURE: 86 MMHG | SYSTOLIC BLOOD PRESSURE: 124 MMHG

## 2018-10-30 DIAGNOSIS — F17.200 TOBACCO USE DISORDER: ICD-10-CM

## 2018-10-30 DIAGNOSIS — J45.40 MODERATE PERSISTENT ASTHMA WITHOUT COMPLICATION: ICD-10-CM

## 2018-10-30 DIAGNOSIS — Z71.6 ENCOUNTER FOR SMOKING CESSATION COUNSELING: Primary | ICD-10-CM

## 2018-10-30 DIAGNOSIS — Z23 NEED FOR PROPHYLACTIC VACCINATION AND INOCULATION AGAINST INFLUENZA: ICD-10-CM

## 2018-10-30 PROCEDURE — 90471 IMMUNIZATION ADMIN: CPT | Performed by: NURSE PRACTITIONER

## 2018-10-30 PROCEDURE — 90686 IIV4 VACC NO PRSV 0.5 ML IM: CPT | Performed by: NURSE PRACTITIONER

## 2018-10-30 PROCEDURE — 99214 OFFICE O/P EST MOD 30 MIN: CPT | Mod: 25 | Performed by: NURSE PRACTITIONER

## 2018-10-30 RX ORDER — FLUTICASONE PROPIONATE 50 MCG
2 SPRAY, SUSPENSION (ML) NASAL DAILY
Qty: 1 BOTTLE | Refills: 11 | Status: SHIPPED | OUTPATIENT
Start: 2018-10-30 | End: 2019-07-24

## 2018-10-30 RX ORDER — ALBUTEROL SULFATE 90 UG/1
2 AEROSOL, METERED RESPIRATORY (INHALATION) EVERY 4 HOURS PRN
Qty: 1 INHALER | Refills: 1 | Status: SHIPPED | OUTPATIENT
Start: 2018-10-30 | End: 2018-12-12

## 2018-10-30 RX ORDER — ALBUTEROL SULFATE 0.83 MG/ML
SOLUTION RESPIRATORY (INHALATION)
Qty: 150 ML | Refills: 1 | Status: SHIPPED | OUTPATIENT
Start: 2018-10-30 | End: 2019-01-22

## 2018-10-30 ASSESSMENT — PAIN SCALES - GENERAL: PAINLEVEL: NO PAIN (0)

## 2018-10-30 NOTE — PROGRESS NOTES

## 2018-10-30 NOTE — MR AVS SNAPSHOT
After Visit Summary   10/30/2018    Jada Lion    MRN: [id number]           Patient Information     Date Of Birth          1990        Visit Information        Provider Department      10/30/2018 11:40 AM Libertad Kraus APRN CNP [facility]        Today's Diagnoses     Encounter for smoking cessation counseling    -  1    Moderate persistent asthma without complication        Tobacco use disorder          Care Instructions    Continue asthma medications as you are already doing.  Add in Flonase 2 prays each side once daily to help with upper airway inflammation.    Varenicline Tartrate (Chantix):   Works to decrease the rewarding aspects of smoking by binding to the same receptors that nicotine does in your brain.    Most commonly reported side effects are nausea (I recommend taking with food to help with this) and sleep issues like insomnia and abnormal/vivid dreams.  Early on, there were concerns for psychiatric side effects but this is no longer found to be true.  However, if you experience any agitation, depression, suicidal thoughts, stop the medication immediately and call the clinic or call 911 right away.    Days 1-3: take 0.5 mg daily (one tablet daily)  Days 4-7: take 0.5 mg twice a day (one tablet in the morning and one tablet in the evening)  Days > 8: take 1 mg twice a day (two tablets in the morning and two tablets in the evening)  Continue for 11 weeks.              Follow-ups after your visit        Follow-up notes from your care team     Return in about 4 weeks (around 11/27/2018) for Follow Up.      Who to contact     If you have questions or need follow up information about today's clinic visit or your schedule please contact [facility] directly at [phone].  Normal or non-critical lab and imaging results will be communicated to you by MyChart, letter or phone within 4 business days after the clinic has received the  results. If you do not hear from us within 7 days, please contact the clinic through [employer] or phone. If you have a critical or abnormal lab result, we will notify you by phone as soon as possible.  Submit refill requests through [employer] or call your pharmacy and they will forward the refill request to us. Please allow 3 business days for your refill to be completed.          Additional Information About Your Visit        Care EveryWhere ID     This is your Care EveryWhere ID. This could be used by other organizations to access your [facility] medical records  [id number]        Your Vitals Were     Pulse Temperature Pulse Oximetry BMI (Body Mass Index)          63 97.8  F (36.6  C) (Oral) 95% 29.11 kg/m2         Blood Pressure from Last 3 Encounters:   10/30/18 124/86   08/27/18 119/74   06/13/18 108/66    Weight from Last 3 Encounters:   10/30/18 179 lb (81.2 kg)   08/27/18 172 lb (78 kg)   06/13/18 169 lb (76.7 kg)              Today, you had the following     No orders found for display         Today's Medication Changes          These changes are accurate as of 10/30/18 12:03 PM.  If you have any questions, ask your nurse or doctor.               Start taking these medicines.        Dose/Directions    varenicline 0.5 MG X 11 & 1 MG X 42 tablet   Commonly known as:  CHANTIX STARTING MONTH PAK   Used for:  Encounter for smoking cessation counseling, Tobacco use disorder   Started by:  Libertad Kraus APRN CNP        Take 0.5 mg tab daily for 3 days, then 0.5 mg tab twice daily for 4 days, then 1 mg twice daily.   Quantity:  53 tablet   Refills:  0            Where to get your medicines      These medications were sent to [id number] - [place], [place] - [address]  [address] [place] [address]     Phone:  [phone]     albuterol (2.5 MG/3ML) 0.083% neb solution    albuterol 108 (90 Base) MCG/ACT inhaler    fluticasone-salmeterol 500-50 MCG/DOSE diskus inhaler     varenicline 0.5 MG X 11 & 1 MG X 42 tablet                Primary Care Provider Office Phone # Fax #    Destin Fischer [phone]       [address]  [facility] [address]        Equal Access to Services     RACHEL NICOLE : Hadii aad ku hadasho Soomaali, waaxda luqadaha, qaybta kaalmada adeegyada, waxay idiin hayaan adeeg kharash lacesarn negrita. So [facility] [phone].    ATENCIÓN: Si habla español, tiene a antunez disposición servicios gratuitos de asistencia lingüística. Llame al [phone].    We comply with applicable federal civil rights laws and [place] laws. We do not discriminate on the basis of race, color, national origin, age, disability, sex, sexual orientation, or gender identity.            Thank you!     Thank you for choosing [facility]  for your care. Our goal is always to provide you with excellent care. Hearing back from our patients is one way we can continue to improve our services. Please take a few minutes to complete the written survey that you may receive in the mail after your visit with us. Thank you!             Your Updated Medication List - Protect others around you: Learn how to safely use, store and throw away your medicines at www.disposemymeds.org.          This list is accurate as of 10/30/18 12:03 PM.  Always use your most recent med list.                   Brand Name Dispense Instructions for use Diagnosis    * albuterol (2.5 MG/3ML) 0.083% neb solution     150 mL    INHALE 1 VIAL VIA NEB EVERY 6 HOURS AS NEEDED FOR SHORTNESS OF BREATH/WHEEZING    Moderate persistent asthma without complication       * albuterol 108 (90 Base) MCG/ACT inhaler    PROAIR HFA/PROVENTIL HFA/VENTOLIN HFA    1 Inhaler    Inhale 2 puffs into the lungs every 4 hours as needed for shortness of breath / dyspnea or wheezing    Moderate persistent asthma without complication       fluticasone-salmeterol 500-50 MCG/DOSE diskus inhaler    ADVAIR    1 Inhaler    Inhale 1 puff into  the lungs 2 times daily    Moderate persistent asthma without complication       varenicline 0.5 MG X 11 & 1 MG X 42 tablet    CHANTIX STARTING MONTH ANTHONY    53 tablet    Take 0.5 mg tab daily for 3 days, then 0.5 mg tab twice daily for 4 days, then 1 mg twice daily.    Encounter for smoking cessation counseling, Tobacco use disorder       * Notice:  This list has 2 medication(s) that are the same as other medications prescribed for you. Read the directions carefully, and ask your doctor or other care provider to review them with you.

## 2018-10-30 NOTE — PATIENT INSTRUCTIONS
Continue asthma medications as you are already doing.  Add in Flonase 2 prays each side once daily to help with upper airway inflammation.    Varenicline Tartrate (Chantix):   Works to decrease the rewarding aspects of smoking by binding to the same receptors that nicotine does in your brain.    Most commonly reported side effects are nausea (I recommend taking with food to help with this) and sleep issues like insomnia and abnormal/vivid dreams.  Early on, there were concerns for psychiatric side effects but this is no longer found to be true.  However, if you experience any agitation, depression, suicidal thoughts, stop the medication immediately and call the clinic or call 911 right away.    Days 1-3: take 0.5 mg daily (one tablet daily)  Days 4-7: take 0.5 mg twice a day (one tablet in the morning and one tablet in the evening)  Days > 8: take 1 mg twice a day (two tablets in the morning and two tablets in the evening)  Continue for 11 weeks.

## 2018-10-30 NOTE — PROGRESS NOTES
SUBJECTIVE:   Jada Lion is a 28 year old male who presents to clinic today for the following health issues:      Asthma Follow-Up    Was ACT completed today?    Yes    ACT Total Scores 10/30/2018   ACT TOTAL SCORE -   ASTHMA ER VISITS -   ASTHMA HOSPITALIZATIONS -   ACT TOTAL SCORE (Goal Greater than or Equal to 20) 20   In the past 12 months, how many times did you visit the emergency room for your asthma without being admitted to the hospital? 0   In the past 12 months, how many times were you hospitalized overnight because of your asthma? 0       Recent asthma triggers that patient is dealing with: dust mites, exercise or sports and cold air    Amount of exercise or physical activity: 4-5 days/week for an average of greater than 60 minutes    Problems taking medications regularly: No    Medication side effects: none    Diet: regular (no restrictions)    Cutting down on smoking, 3 cigarettes a day now.  Was doing more like 1 ppd.  His daughter is a big motivation for him.  He would be interested in chantix.      Has noted an increase in runny nose/congestion over the last few months.  He is a mouth breather and reports he has been. Unsure if he snores. He'd like to see if treatment of allergies helps with this.        Problem list and histories reviewed & adjusted, as indicated.  Additional history: as documented    Patient Active Problem List   Diagnosis     Moderate persistent asthma     Seasonal allergic rhinitis     Plantar fasciitis     Overweight (BMI 25.0-29.9)     CARDIOVASCULAR SCREENING; LDL GOAL LESS THAN 160     Moderate persistent asthma without complication     Hip pain, left     Tobacco use disorder     Rotator cuff injury, right, initial encounter     Cheilitis     Dry eyes     Acute left-sided low back pain without sciatica     Past Surgical History:   Procedure Laterality Date     DENTAL SURGERY  1-2015    wisdom teeth        Social History   Substance Use Topics     Smoking status:  Current Every Day Smoker     Packs/day: 0.20     Smokeless tobacco: Never Used     Alcohol use Yes     Family History   Problem Relation Age of Onset     Diabetes Mother      Hypertension Mother      Asthma Mother      Other Cancer Maternal Grandfather      Other Cancer Paternal Grandfather          Current Outpatient Prescriptions   Medication Sig Dispense Refill     albuterol (2.5 MG/3ML) 0.083% neb solution INHALE 1 VIAL VIA NEB EVERY 6 HOURS AS NEEDED FOR SHORTNESS OF BREATH/WHEEZING 150 mL 1     albuterol (PROAIR HFA/PROVENTIL HFA/VENTOLIN HFA) 108 (90 Base) MCG/ACT inhaler Inhale 2 puffs into the lungs every 4 hours as needed for shortness of breath / dyspnea or wheezing 1 Inhaler 1     fluticasone (FLONASE) 50 MCG/ACT spray Spray 2 sprays into both nostrils daily 1 Bottle 11     fluticasone-salmeterol (ADVAIR) 500-50 MCG/DOSE diskus inhaler Inhale 1 puff into the lungs 2 times daily 1 Inhaler 11     varenicline (CHANTIX STARTING MONTH PAK) 0.5 MG X 11 & 1 MG X 42 tablet Take 0.5 mg tab daily for 3 days, then 0.5 mg tab twice daily for 4 days, then 1 mg twice daily. 53 tablet 0     [DISCONTINUED] albuterol (2.5 MG/3ML) 0.083% neb solution INHALE 1 VIAL VIA NEB EVERY 6 HOURS AS NEEDED FOR SHORTNESS OF BREATH/WHEEZING 150 mL 3     [DISCONTINUED] albuterol (PROAIR HFA/PROVENTIL HFA/VENTOLIN HFA) 108 (90 Base) MCG/ACT Inhaler Inhale 2 puffs into the lungs every 4 hours as needed for shortness of breath / dyspnea or wheezing 2 Inhaler 3     [DISCONTINUED] fluticasone-salmeterol (ADVAIR) 500-50 MCG/DOSE diskus inhaler Inhale 1 puff into the lungs 2 times daily 1 Inhaler 5     Allergies   Allergen Reactions     Singulair [Montelukast]      Abdominal pain     BP Readings from Last 3 Encounters:   10/30/18 124/86   08/27/18 119/74   06/13/18 108/66    Wt Readings from Last 3 Encounters:   10/30/18 179 lb (81.2 kg)   08/27/18 172 lb (78 kg)   06/13/18 169 lb (76.7 kg)                    Reviewed and updated as needed  this visit by clinical staff  Tobacco  Allergies  Meds  Med Hx  Surg Hx  Fam Hx  Soc Hx      Reviewed and updated as needed this visit by Provider  Tobacco  Allergies  Meds  Med Hx  Surg Hx  Fam Hx  Soc Hx        ROS:  Constitutional, HEENT, cardiovascular, pulmonary, gi and gu systems are negative, except as otherwise noted.    OBJECTIVE:     /86  Pulse 63  Temp 97.8  F (36.6  C) (Oral)  Wt 179 lb (81.2 kg)  SpO2 95%  BMI 29.11 kg/m2  Body mass index is 29.11 kg/(m^2).  GENERAL: healthy, alert and no distress  EYES: Eyes grossly normal to inspection, PERRL and conjunctivae and sclerae normal  HENT: normal cephalic/atraumatic, ear canals and TM's normal, nasal mucosa edematous , rhinorrhea clear, oropharynx clear and oral mucous membranes moist; no tonsillar hypertrophy  NECK: no adenopathy, no asymmetry, masses, or scars and thyroid normal to palpation  RESP: lungs clear to auscultation - no rales, rhonchi or wheezes  CV: regular rate and rhythm, normal S1 S2, no S3 or S4, no murmur, click or rub, no peripheral edema and peripheral pulses strong  ABDOMEN: soft, nontender, no hepatosplenomegaly, no masses and bowel sounds normal  MS: no gross musculoskeletal defects noted, no edema    Diagnostic Test Results:  none     ASSESSMENT/PLAN:     1. Moderate persistent asthma without complication  Well-controlled.  Refilled below.    - albuterol (2.5 MG/3ML) 0.083% neb solution; INHALE 1 VIAL VIA NEB EVERY 6 HOURS AS NEEDED FOR SHORTNESS OF BREATH/WHEEZING  Dispense: 150 mL; Refill: 1  - albuterol (PROAIR HFA/PROVENTIL HFA/VENTOLIN HFA) 108 (90 Base) MCG/ACT inhaler; Inhale 2 puffs into the lungs every 4 hours as needed for shortness of breath / dyspnea or wheezing  Dispense: 1 Inhaler; Refill: 1  - fluticasone-salmeterol (ADVAIR) 500-50 MCG/DOSE diskus inhaler; Inhale 1 puff into the lungs 2 times daily  Dispense: 1 Inhaler; Refill: 11  - fluticasone (FLONASE) 50 MCG/ACT spray; Spray 2 sprays into  both nostrils daily  Dispense: 1 Bottle; Refill: 11    2. Encounter for smoking cessation counseling  Patient counseled on below.  Discussed setting a quit date.  Follow-up in 1 months.    - varenicline (CHANTIX STARTING MONTH ANTHONY) 0.5 MG X 11 & 1 MG X 42 tablet; Take 0.5 mg tab daily for 3 days, then 0.5 mg tab twice daily for 4 days, then 1 mg twice daily.  Dispense: 53 tablet; Refill: 0    3. Tobacco use disorder  - varenicline (CHANTIX STARTING MONTH ANTHONY) 0.5 MG X 11 & 1 MG X 42 tablet; Take 0.5 mg tab daily for 3 days, then 0.5 mg tab twice daily for 4 days, then 1 mg twice daily.  Dispense: 53 tablet; Refill: 0    4. Need for prophylactic vaccination and inoculation against influenza  Given.   - Vaccine Administration, Initial [27476]  - FLU VACCINE, SPLIT VIRUS, IM (QUADRIVALENT) [06294]- >3 YRS    Patient Instructions   Continue asthma medications as you are already doing.  Add in Flonase 2 prays each side once daily to help with upper airway inflammation.    Varenicline Tartrate (Chantix):   Works to decrease the rewarding aspects of smoking by binding to the same receptors that nicotine does in your brain.    Most commonly reported side effects are nausea (I recommend taking with food to help with this) and sleep issues like insomnia and abnormal/vivid dreams.  Early on, there were concerns for psychiatric side effects but this is no longer found to be true.  However, if you experience any agitation, depression, suicidal thoughts, stop the medication immediately and call the clinic or call 911 right away.    Days 1-3: take 0.5 mg daily (one tablet daily)  Days 4-7: take 0.5 mg twice a day (one tablet in the morning and one tablet in the evening)  Days > 8: take 1 mg twice a day (two tablets in the morning and two tablets in the evening)  Continue for 11 weeks.          RADHIKA Jimenez [facility]

## 2018-10-30 NOTE — LETTER
October 30, 2018      Jada Lion  [address]  [facility] [place] [address]        To Whom It May Concern:    Jada Lion was seen in our clinic 10/30/2018. He may return to work without restrictions 10/31/18.      Sincerely,        RADHIKA Jimenez CNP

## 2018-10-31 ASSESSMENT — ASTHMA QUESTIONNAIRES: ACT_TOTALSCORE: 20

## 2018-12-11 DIAGNOSIS — J45.40 MODERATE PERSISTENT ASTHMA WITHOUT COMPLICATION: ICD-10-CM

## 2018-12-11 NOTE — TELEPHONE ENCOUNTER
"Requested Prescriptions   Pending Prescriptions Disp Refills     albuterol (PROAIR HFA/PROVENTIL HFA/VENTOLIN HFA) 108 (90 Base) MCG/ACT inhaler  Last Written Prescription Date:  10/30/18  Last Fill Quantity: 1,  # refills: 1   Last Office Visit with G, P or [facility] prescribing provider:  10/30/18-Efra   Future Office Visit:    1 Inhaler 1     Sig: Inhale 2 puffs into the lungs every 4 hours as needed for shortness of breath / dyspnea or wheezing    Asthma Maintenance Inhalers - Anticholinergics Passed - 12/11/2018 12:35 PM       Passed - Patient is age 12 years or older       Passed - Asthma control assessment score within normal limits in last 6 months    Please review ACT score.          Passed - Recent (6 mo) or future (30 days) visit within the authorizing provider's specialty    Patient had office visit in the last 6 months or has a visit in the next 30 days with authorizing provider or within the authorizing provider's specialty.  See \"Patient Info\" tab in inbasket, or \"Choose Columns\" in Meds & Orders section of the refill encounter.              "

## 2018-12-12 RX ORDER — ALBUTEROL SULFATE 90 UG/1
2 AEROSOL, METERED RESPIRATORY (INHALATION) EVERY 4 HOURS PRN
Qty: 1 INHALER | Refills: 1 | Status: SHIPPED | OUTPATIENT
Start: 2018-12-12 | End: 2019-01-11

## 2018-12-12 NOTE — TELEPHONE ENCOUNTER
Prescription approved per [facility] Refill Protocol.    Radha Crowder RN, [facility]

## 2019-01-10 DIAGNOSIS — J45.40 MODERATE PERSISTENT ASTHMA WITHOUT COMPLICATION: ICD-10-CM

## 2019-01-11 RX ORDER — ALBUTEROL SULFATE 90 UG/1
2 AEROSOL, METERED RESPIRATORY (INHALATION) EVERY 4 HOURS PRN
Qty: 1 INHALER | Refills: 1 | Status: SHIPPED | OUTPATIENT
Start: 2019-01-11 | End: 2019-01-22

## 2019-01-22 ENCOUNTER — OFFICE VISIT (OUTPATIENT)
Dept: FAMILY MEDICINE | Facility: CLINIC | Age: 29
End: 2019-01-22

## 2019-01-22 VITALS
WEIGHT: 180.5 LBS | HEIGHT: 66 IN | RESPIRATION RATE: 18 BRPM | OXYGEN SATURATION: 97 % | SYSTOLIC BLOOD PRESSURE: 114 MMHG | HEART RATE: 71 BPM | TEMPERATURE: 98.3 F | BODY MASS INDEX: 29.01 KG/M2 | DIASTOLIC BLOOD PRESSURE: 70 MMHG

## 2019-01-22 DIAGNOSIS — J45.41 MODERATE PERSISTENT ASTHMA WITH ACUTE EXACERBATION: ICD-10-CM

## 2019-01-22 DIAGNOSIS — Z20.828 EXPOSURE TO INFLUENZA: Primary | ICD-10-CM

## 2019-01-22 DIAGNOSIS — R68.89 FLU-LIKE SYMPTOMS: ICD-10-CM

## 2019-01-22 LAB
DEPRECATED S PYO AG THROAT QL EIA: NORMAL
FLUAV+FLUBV AG SPEC QL: NEGATIVE
FLUAV+FLUBV AG SPEC QL: NEGATIVE
SPECIMEN SOURCE: NORMAL
SPECIMEN SOURCE: NORMAL

## 2019-01-22 PROCEDURE — 87081 CULTURE SCREEN ONLY: CPT | Performed by: PHYSICIAN ASSISTANT

## 2019-01-22 PROCEDURE — 87804 INFLUENZA ASSAY W/OPTIC: CPT | Performed by: PHYSICIAN ASSISTANT

## 2019-01-22 PROCEDURE — 99214 OFFICE O/P EST MOD 30 MIN: CPT | Performed by: PHYSICIAN ASSISTANT

## 2019-01-22 PROCEDURE — 87880 STREP A ASSAY W/OPTIC: CPT | Performed by: PHYSICIAN ASSISTANT

## 2019-01-22 RX ORDER — GUAIFENESIN AND DEXTROMETHORPHAN HYDROBROMIDE 1200; 60 MG/1; MG/1
1 TABLET, EXTENDED RELEASE ORAL 2 TIMES DAILY
Qty: 28 TABLET | Refills: 0 | Status: SHIPPED | OUTPATIENT
Start: 2019-01-22 | End: 2019-06-10

## 2019-01-22 RX ORDER — PREDNISONE 20 MG/1
40 TABLET ORAL DAILY
Qty: 10 TABLET | Refills: 0 | Status: SHIPPED | OUTPATIENT
Start: 2019-01-22 | End: 2019-06-10

## 2019-01-22 RX ORDER — OSELTAMIVIR PHOSPHATE 75 MG/1
75 CAPSULE ORAL 2 TIMES DAILY
Qty: 10 CAPSULE | Refills: 0 | Status: SHIPPED | OUTPATIENT
Start: 2019-01-22 | End: 2019-06-10

## 2019-01-22 RX ORDER — ALBUTEROL SULFATE 90 UG/1
2 AEROSOL, METERED RESPIRATORY (INHALATION) EVERY 4 HOURS PRN
Qty: 1 INHALER | Refills: 1 | Status: SHIPPED | OUTPATIENT
Start: 2019-01-22 | End: 2019-04-20

## 2019-01-22 RX ORDER — ALBUTEROL SULFATE 0.83 MG/ML
SOLUTION RESPIRATORY (INHALATION)
Qty: 150 ML | Refills: 1 | Status: SHIPPED | OUTPATIENT
Start: 2019-01-22 | End: 2019-12-11

## 2019-01-22 ASSESSMENT — PAIN SCALES - GENERAL: PAINLEVEL: SEVERE PAIN (7)

## 2019-01-22 ASSESSMENT — MIFFLIN-ST. JEOR: SCORE: 1741.49

## 2019-01-22 NOTE — LETTER
My Asthma Action Plan  Name: Jada Lion   YOB: 1990  Date: 1/22/2019   My doctor: Kalyani Estrada PA-C   My clinic: [facility]        My Control Medicine: Fluticasone + salmeterol (Advair) -  Diskus 250/50 mcg twice a day  My Rescue Medicine: Albuterol nebulizer solution q 4-6 hrs prn  Albuterol (Proair/Ventolin/Proventil) inhaler 2 puffs q 4-6 hrs prn   My Oral Steroid Medicine: Prednisone 40 mg daily for 5 days  My Asthma Severity: mild persistent  Avoid your asthma triggers: upper respiratory infections  dust mites  exercise or sports  cold air            GREEN ZONE   Good Control    I feel good    No cough or wheeze    Can work, sleep and play without asthma symptoms       Take your asthma control medicine every day.     1. If exercise triggers your asthma, take your rescue medication    15 minutes before exercise or sports, and    During exercise if you have asthma symptoms  2. Spacer to use with inhaler: If you have a spacer, make sure to use it with your inhaler             YELLOW ZONE Getting Worse  I have ANY of these:    I do not feel good    Cough or wheeze    Chest feels tight    Wake up at night   1. Keep taking your Green Zone medications  2. Start taking your rescue medicine:    every 20 minutes for up to 1 hour. Then every 4 hours for 24-48 hours.  3. If you stay in the Yellow Zone for more than 12-24 hours, contact your doctor.  4. If you do not return to the Green Zone in 12-24 hours or you get worse, start taking your oral steroid medicine if prescribed by your provider.           RED ZONE Medical Alert - Get Help  I have ANY of these:    I feel awful    Medicine is not helping    Breathing getting harder    Trouble walking or talking    Nose opens wide to breathe       1. Take your rescue medicine NOW  2. If your provider has prescribed an oral steroid medicine, start taking it NOW  3. Call your doctor NOW  4. If you are still in the Red Zone  after 20 minutes and you have not reached your doctor:    Take your rescue medicine again and    Call 911 or go to the emergency room right away    See your regular doctor within 2 weeks of an Emergency Room or Urgent Care visit for follow-up treatment.          Annual Reminders:  Meet with Asthma Educator,  Flu Shot in the Fall, consider Pneumonia Vaccination for patients with asthma (aged 19 and older).    Pharmacy:    [facility] [id number] [place] [facility], [place] - [address]  [id number] - [facility], [place] - [address]                      Asthma Triggers  How To Control Things That Make Your Asthma Worse    Triggers are things that make your asthma worse.  Look at the list below to help you find your triggers and what you can do about them.  You can help prevent asthma flare-ups by staying away from your triggers.      Trigger                                                          What you can do   Cigarette Smoke  Tobacco smoke can make asthma worse. Do not allow smoking in your home, car or around you.  Be sure no one smokes at a child s day care or school.  If you smoke, ask your health care provider for ways to help you quit.  Ask family members to quit too.  Ask your health care provider for a referral to Quit Plan to help you quit smoking, or call 1-760-113-PLAN.     Colds, Flu, Bronchitis  These are common triggers of asthma. Wash your hands often.  Don t touch your eyes, nose or mouth.  Get a flu shot every year.     Dust Mites  These are tiny bugs that live in cloth or carpet. They are too small to see. Wash sheets and blankets in hot water every week.   Encase pillows and mattress in dust mite proof covers.  Avoid having carpet if you can. If you have carpet, vacuum weekly.   Use a dust mask and HEPA vacuum.   Pollen and Outdoor Mold  Some people are allergic to trees, grass, or weed pollen, or molds. Try to keep your windows closed.  Limit time out doors when pollen count  is high.   Ask you health care provider about taking medicine during allergy season.     Animal Dander  Some people are allergic to skin flakes, urine or saliva from pets with fur or feathers. Keep pets with fur or feathers out of your home.    If you can t keep the pet outdoors, then keep the pet out of your bedroom.  Keep the bedroom door closed.  Keep pets off cloth furniture and away from stuffed toys.     Mice, Rats, and Cockroaches  Some people are allergic to the waste from these pests.   Cover food and garbage.  Clean up spills and food crumbs.  Store grease in the refrigerator.   Keep food out of the bedroom.   Indoor Mold  This can be a trigger if your home has high moisture. Fix leaking faucets, pipes, or other sources of water.   Clean moldy surfaces.  Dehumidify basement if it is damp and smelly.   Smoke, Strong Odors, and Sprays  These can reduce air quality. Stay away from strong odors and sprays, such as perfume, powder, hair spray, paints, smoke incense, paint, cleaning products, candles and new carpet.   Exercise or Sports  Some people with asthma have this trigger. Be active!  Ask your doctor about taking medicine before sports or exercise to prevent symptoms.    Warm up for 5-10 minutes before and after sports or exercise.     Other Triggers of Asthma  Cold air:  Cover your nose and mouth with a scarf.  Sometimes laughing or crying can be a trigger.  Some medicines and food can trigger asthma.

## 2019-01-22 NOTE — PROGRESS NOTES
SUBJECTIVE:   Jada Lion is a 28 year old male who presents to clinic today for the following health issues:    Acute Illness   Acute illness concerns: flu  Onset: 3 days    Fever: YES    Chills/Sweats: YES    Headache (location?): YES    Sinus Pressure:no    Conjunctivitis:  YES: right    Ear Pain: no    Rhinorrhea: no    Congestion: YES    Sore Throat: YES     Cough: YES-productive of yellow sputum a lot     Wheeze: YES    Decreased Appetite: YES    Nausea: YES    Vomiting: YES    Diarrhea:  YES    Dysuria/Freq.: no    Fatigue/Achiness: YES    Sick/Strep Exposure: YES- daughter      Therapies Tried and outcome: OTC medications       Problem list and histories reviewed & adjusted, as indicated.  Additional history: as documented    Patient Active Problem List   Diagnosis     Moderate persistent asthma     Seasonal allergic rhinitis     Plantar fasciitis     Overweight (BMI 25.0-29.9)     CARDIOVASCULAR SCREENING; LDL GOAL LESS THAN 160     Moderate persistent asthma without complication     Hip pain, left     Tobacco use disorder     Rotator cuff injury, right, initial encounter     Cheilitis     Dry eyes     Acute left-sided low back pain without sciatica     Past Surgical History:   Procedure Laterality Date     DENTAL SURGERY  1-2015    wisdom teeth        Social History     Tobacco Use     Smoking status: Current Every Day Smoker     Packs/day: 0.20     Smokeless tobacco: Never Used   Substance Use Topics     Alcohol use: Yes     Family History   Problem Relation Age of Onset     Diabetes Mother      Hypertension Mother      Asthma Mother      Other Cancer Maternal Grandfather      Other Cancer Paternal Grandfather          Current Outpatient Medications   Medication Sig Dispense Refill     albuterol (PROAIR HFA/PROVENTIL HFA/VENTOLIN HFA) 108 (90 Base) MCG/ACT inhaler Inhale 2 puffs into the lungs every 4 hours as needed for shortness of breath / dyspnea or wheezing 1 Inhaler 1     albuterol (PROVENTIL)  "(2.5 MG/3ML) 0.083% neb solution INHALE 1 VIAL VIA NEB EVERY 6 HOURS AS NEEDED FOR SHORTNESS OF BREATH/WHEEZING 150 mL 1     Dextromethorphan-Guaifenesin  MG TB12 Take 1 tablet by mouth 2 times daily 28 tablet 0     fluticasone (FLONASE) 50 MCG/ACT spray Spray 2 sprays into both nostrils daily 1 Bottle 11     fluticasone-salmeterol (ADVAIR) 500-50 MCG/DOSE inhaler Inhale 1 puff into the lungs 2 times daily 1 Inhaler 11     oseltamivir (TAMIFLU) 75 MG capsule Take 1 capsule (75 mg) by mouth 2 times daily for 5 days 10 capsule 0     predniSONE (DELTASONE) 20 MG tablet Take 40 mg by mouth daily for 5 days. 10 tablet 0     varenicline (CHANTIX STARTING MONTH ANTHONY) 0.5 MG X 11 & 1 MG X 42 tablet Take 0.5 mg tab daily for 3 days, then 0.5 mg tab twice daily for 4 days, then 1 mg twice daily. 53 tablet 0     Allergies   Allergen Reactions     Singulair [Montelukast]      Abdominal pain       Reviewed and updated as needed this visit by clinical staff  Tobacco  Allergies  Meds  Problems  Med Hx  Surg Hx  Fam Hx  Soc Hx        Reviewed and updated as needed this visit by Provider  Tobacco  Allergies  Meds  Problems  Med Hx  Surg Hx  Fam Hx         ROS:  Constitutional, HEENT, cardiovascular, pulmonary, gi and gu systems are negative, except as otherwise noted.    OBJECTIVE:     /70 (BP Location: Right arm, Patient Position: Sitting, Cuff Size: Adult Large)   Pulse 71   Temp 98.3  F (36.8  C) (Oral)   Resp 18   Ht 1.676 m (5' 6\")   Wt 81.9 kg (180 lb 8 oz)   SpO2 97%   BMI 29.13 kg/m    Body mass index is 29.13 kg/m .  GENERAL: healthy, alert and no distress  EYES: Eyes grossly normal to inspection, PERRL and conjunctivae and sclerae normal  HENT: normal cephalic/atraumatic, ear canals and TM's normal, nose and mouth without ulcers or lesions, nasal mucosa edematous , rhinorrhea clear and oral mucous membranes moist  NECK: no adenopathy, no asymmetry, masses, or scars and thyroid normal to " palpation  RESP: no rales , no rhonchi and expiratory wheezes bilateral  CV: regular rate and rhythm, normal S1 S2, no S3 or S4, no murmur, click or rub, no peripheral edema and peripheral pulses strong  ABDOMEN: soft, nontender, no hepatosplenomegaly, no masses and bowel sounds normal  MS: no gross musculoskeletal defects noted, no edema    Diagnostic Test Results:  Results for orders placed or performed in visit on 01/22/19 (from the past 24 hour(s))   Influenza A/B antigen   Result Value Ref Range    Influenza A/B Agn Specimen Nasal     Influenza A Negative NEG^Negative    Influenza B Negative NEG^Negative   Strep, Rapid Screen   Result Value Ref Range    Specimen Description Throat     Rapid Strep A Screen       NEGATIVE: No Group A streptococcal antigen detected by immunoassay, await culture report.       ASSESSMENT/PLAN:         ICD-10-CM    1. Exposure to influenza Z20.828 Influenza A/B antigen     oseltamivir (TAMIFLU) 75 MG capsule   2. Flu-like symptoms R68.89 Influenza A/B antigen     Strep, Rapid Screen     Beta strep group A culture     oseltamivir (TAMIFLU) 75 MG capsule     Dextromethorphan-Guaifenesin  MG TB12   3. Moderate persistent asthma with acute exacerbation J45.41 albuterol (PROVENTIL) (2.5 MG/3ML) 0.083% neb solution     albuterol (PROAIR HFA/PROVENTIL HFA/VENTOLIN HFA) 108 (90 Base) MCG/ACT inhaler     fluticasone-salmeterol (ADVAIR) 500-50 MCG/DOSE inhaler     predniSONE (DELTASONE) 20 MG tablet     Prednisone 40 mg (2 tablets) once daily for 5 days -for asthma  Tamiflu 1 capsule twice a day for 5 days after eating   Mucinex dm 1 tablet twice a day for 10 days   Albuterol neb or inhaler every 4 hours as needed   Continue regular asthma medications     Follow up if worsens       Kalyani Estrada PA-C  [facility]

## 2019-01-22 NOTE — LETTER
[address] [address]  Phone: [phone]    January 22, 2019        Jada Lion  [address]  [facility] [place] [address]          To whom it may concern:    RE: Jada Lion    Patient was seen and treated today at our clinic and missed work 1/22/19-1/28/19 due to influenza     Please contact me for questions or concerns.      Sincerely,        Tracey Estrada PAC

## 2019-01-22 NOTE — PATIENT INSTRUCTIONS
Prednisone 40 mg (2 tablets) once daily for 5 days -for asthma  Tamiflu 1 capsule twice a day for 5 days after eating   Mucinex dm 1 tablet twice a day for 10 days   Albuterol neb or inhaler every 4 hours as needed   Continue regular asthma medications     Follow up if worsens

## 2019-01-22 NOTE — LETTER
[address] [address]  Phone: [phone]    January 22, 2019        Jada Lion  [address]  [facility] [place] [address]          To whom it may concern:    RE: Jada Lion    Patient was seen and treated today at our clinic and missed work 1/22/19-1/24/19    Please contact me for questions or concerns.      Sincerely,        Tracey Estrada PAC

## 2019-01-23 LAB
BACTERIA SPEC CULT: NORMAL
SPECIMEN SOURCE: NORMAL

## 2019-04-20 DIAGNOSIS — J45.41 MODERATE PERSISTENT ASTHMA WITH ACUTE EXACERBATION: ICD-10-CM

## 2019-04-20 NOTE — TELEPHONE ENCOUNTER
"Requested Prescriptions   Pending Prescriptions Disp Refills     albuterol (PROAIR HFA/PROVENTIL HFA/VENTOLIN HFA) 108 (90 Base) MCG/ACT inhaler [Pharmacy Med Name: ALBUTEROL HFA (PROAIR) INHALER]  Last Written Prescription Date:  1/22/19  Last Fill Quantity: 1,  # refills: 1   Last Office Visit with [facility], [facility] or [facility] prescribing provider:  1/22/19   Future Office Visit:       1     Sig: INHALE 2 PUFFS INTO THE LUNGS EVERY 4 HOURS AS NEEDED FOR SHORTNESS OF BREATH / DYSPNEA OR WHEEZING       Asthma Maintenance Inhalers - Anticholinergics Passed - 4/20/2019 12:19 AM        Passed - Patient is age 12 years or older        Passed - Asthma control assessment score within normal limits in last 6 months     Please review ACT score.           Passed - Medication is active on med list        Passed - Recent (6 mo) or future (30 days) visit within the authorizing provider's specialty     Patient had office visit in the last 6 months or has a visit in the next 30 days with authorizing provider or within the authorizing provider's specialty.  See \"Patient Info\" tab in inbasket, or \"Choose Columns\" in Meds & Orders section of the refill encounter.              "

## 2019-04-22 RX ORDER — ALBUTEROL SULFATE 90 UG/1
2 AEROSOL, METERED RESPIRATORY (INHALATION) EVERY 4 HOURS PRN
Qty: 8.5 G | Refills: 0 | Status: SHIPPED | OUTPATIENT
Start: 2019-04-22 | End: 2019-06-10

## 2019-04-22 NOTE — TELEPHONE ENCOUNTER
Prescription approved per [facility] Refill Protocol.  Barbie Bishop RN

## 2019-06-10 ENCOUNTER — OFFICE VISIT (OUTPATIENT)
Dept: FAMILY MEDICINE | Facility: CLINIC | Age: 29
End: 2019-06-10
Payer: COMMERCIAL

## 2019-06-10 VITALS
DIASTOLIC BLOOD PRESSURE: 73 MMHG | BODY MASS INDEX: 29.54 KG/M2 | HEIGHT: 66 IN | SYSTOLIC BLOOD PRESSURE: 133 MMHG | WEIGHT: 183.8 LBS | TEMPERATURE: 98.2 F | OXYGEN SATURATION: 98 % | HEART RATE: 60 BPM | RESPIRATION RATE: 18 BRPM

## 2019-06-10 DIAGNOSIS — J45.41 MODERATE PERSISTENT ASTHMA WITH ACUTE EXACERBATION: ICD-10-CM

## 2019-06-10 PROCEDURE — 99213 OFFICE O/P EST LOW 20 MIN: CPT | Performed by: PHYSICIAN ASSISTANT

## 2019-06-10 ASSESSMENT — MIFFLIN-ST. JEOR: SCORE: 1756.46

## 2019-06-10 ASSESSMENT — PAIN SCALES - GENERAL: PAINLEVEL: NO PAIN (0)

## 2019-06-10 NOTE — PATIENT INSTRUCTIONS
Patient Education     Controlling Your Asthma  You can do a lot to manage your asthma and improve your quality of life. You will need to work with your healthcare provider to develop a plan. But it s up to you to put this plan into action.  Why you need to take control  You need to control the inflammation in your lungs. Take all medicine as directed, especially controller medicines, even if you feel that your asthma is under good control. You also need to relieve symptoms when you have them. These are long-term tasks. But the more you stay in control, the better you ll feel. If you don t stay in control:    Asthma symptoms may cause you to miss school, work, or activities that you enjoy.    Asthma flare-ups can be dangerous, even deadly.    Uncontrolled asthma makes it more likely that you will need emergency department and in-hospital care.    Uncontrolled asthma may cause permanent damage to your lungs.    Peak flow monitoring helps measure how open your airways are.   Taking medicine helps you control your asthma and relieve symptoms when they occur.     Using an Asthma Action Plan will help you keep track of and respond to asthma symptoms.   Avoiding triggers--the things that inflame your airways--will help prevent symptoms and flare-ups.   Your action plan  Your healthcare provider will help you prepare, and when needed, update your personal Asthma Action Plan. Your plan tells you what to do based on your current symptoms. If you don't have an Asthma Action Plan, or if yours isn't up-to-date, make sure you talk with your healthcare provider.  Date Last Reviewed: 1/1/2017 2000-2018 [employer]. [address] [place] [address], [place] [address]. All rights reserved. This information is not intended as a substitute for professional medical care. Always follow your healthcare professional's instructions.

## 2019-06-10 NOTE — PROGRESS NOTES
Subjective     Jada Lion is a 28 year old male who presents to clinic today for the following health issues:    HPI   Asthma Follow-Up    Was ACT completed today?    Yes    ACT Total Scores 6/10/2019   ACT TOTAL SCORE -   ASTHMA ER VISITS -   ASTHMA HOSPITALIZATIONS -   ACT TOTAL SCORE (Goal Greater than or Equal to 20) 16   In the past 12 months, how many times did you visit the emergency room for your asthma without being admitted to the hospital? 0   In the past 12 months, how many times were you hospitalized overnight because of your asthma? 0       How many days per week do you miss taking your asthma controller medication?  0    Please describe any recent triggers for your asthma: dust mites, humidity, occupational exposure and exercise or sports    Have you had any Emergency Room Visits, Urgent Care Visits, or Hospital Admissions since your last office visit?  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 Yes but 1 pack lasts ~ 2 weeks            Allergies   Allergen Reactions     Singulair [Montelukast]      Abdominal pain       History reviewed. No pertinent past medical history.    Patient Active Problem List   Diagnosis     Moderate persistent asthma     Seasonal allergic rhinitis     Plantar fasciitis     Overweight (BMI 25.0-29.9)     CARDIOVASCULAR SCREENING; LDL GOAL LESS THAN 160     Moderate persistent asthma without complication     Hip pain, left     Tobacco use disorder     Rotator cuff injury, right, initial encounter     Cheilitis     Dry eyes     Acute left-sided low back pain without sciatica         Current Outpatient Medications on File Prior to Visit:  albuterol (PROVENTIL) (2.5 MG/3ML) 0.083% neb solution INHALE 1 VIAL VIA NEB EVERY 6 HOURS AS NEEDED FOR SHORTNESS OF BREATH/WHEEZING   fluticasone-salmeterol (ADVAIR) 500-50 MCG/DOSE inhaler Inhale 1 puff into the lungs 2 times daily   fluticasone (FLONASE) 50  "MCG/ACT spray Spray 2 sprays into both nostrils daily (Patient not taking: Reported on 6/10/2019)   varenicline (CHANTIX STARTING MONTH ANTHONY) 0.5 MG X 11 & 1 MG X 42 tablet Take 0.5 mg tab daily for 3 days, then 0.5 mg tab twice daily for 4 days, then 1 mg twice daily. (Patient not taking: Reported on 6/10/2019)     No current facility-administered medications on file prior to visit.     Social History     Tobacco Use     Smoking status: Current Every Day Smoker     Packs/day: 0.20     Smokeless tobacco: Never Used   Substance Use Topics     Alcohol use: Yes     Drug use: No       Family History   Problem Relation Age of Onset     Diabetes Mother      Hypertension Mother      Asthma Mother      Other Cancer Maternal Grandfather      Other Cancer Paternal Grandfather        ROS:  General: negative for fever  Resp: + as above  CV: negative for chest pain     OBJECTIVE:  /73 (BP Location: Left arm, Patient Position: Sitting, Cuff Size: Adult Regular)   Pulse 60   Temp 98.2  F (36.8  C) (Oral)   Resp 18   Ht 1.676 m (5' 6\")   Wt 83.4 kg (183 lb 12.8 oz)   SpO2 98%   BMI 29.67 kg/m     General:   awake, alert, and cooperative.  NAD.   Head: Normocephalic, atraumatic.  Eyes: Conjunctiva clear,   Heart: Regular rate and rhythm. No murmur.  Lungs: Chest is clear; no wheezes or rales.   Neuro: Alert and oriented - normal speech.    ASSESSMENT:    ICD-10-CM    1. Moderate persistent asthma with acute exacerbation J45.41 ipratropium (ATROVENT HFA) 17 MCG/ACT inhaler     albuterol (PROAIR RESPICLICK) 108 (90 Base) MCG/ACT inhaler       PLAN: quit smoking  Follow up:  3 months for recheck  Advised about symptoms which might herald more serious problems.              "

## 2019-06-11 ASSESSMENT — ASTHMA QUESTIONNAIRES: ACT_TOTALSCORE: 16

## 2019-06-12 RX ORDER — ALBUTEROL SULFATE 90 UG/1
2 AEROSOL, METERED RESPIRATORY (INHALATION) EVERY 4 HOURS PRN
Qty: 8.5 INHALER | Refills: 1 | OUTPATIENT
Start: 2019-06-12

## 2019-07-24 ENCOUNTER — ANCILLARY PROCEDURE (OUTPATIENT)
Dept: GENERAL RADIOLOGY | Facility: CLINIC | Age: 29
End: 2019-07-24
Attending: PHYSICIAN ASSISTANT
Payer: COMMERCIAL

## 2019-07-24 ENCOUNTER — OFFICE VISIT (OUTPATIENT)
Dept: FAMILY MEDICINE | Facility: CLINIC | Age: 29
End: 2019-07-24
Payer: COMMERCIAL

## 2019-07-24 VITALS
DIASTOLIC BLOOD PRESSURE: 86 MMHG | BODY MASS INDEX: 30.22 KG/M2 | HEART RATE: 86 BPM | HEIGHT: 66 IN | SYSTOLIC BLOOD PRESSURE: 124 MMHG | WEIGHT: 188 LBS | TEMPERATURE: 98.3 F | RESPIRATION RATE: 18 BRPM | OXYGEN SATURATION: 97 %

## 2019-07-24 DIAGNOSIS — R07.89 ATYPICAL CHEST PAIN: ICD-10-CM

## 2019-07-24 DIAGNOSIS — R07.89 ATYPICAL CHEST PAIN: Primary | ICD-10-CM

## 2019-07-24 DIAGNOSIS — E83.52 SERUM CALCIUM ELEVATED: ICD-10-CM

## 2019-07-24 DIAGNOSIS — R94.31 ABNORMAL ELECTROCARDIOGRAM: ICD-10-CM

## 2019-07-24 DIAGNOSIS — E87.1 HYPONATREMIA: ICD-10-CM

## 2019-07-24 DIAGNOSIS — R79.89 ABNORMAL TSH: ICD-10-CM

## 2019-07-24 DIAGNOSIS — R19.7 DIARRHEA, UNSPECIFIED TYPE: ICD-10-CM

## 2019-07-24 DIAGNOSIS — Z13.220 SCREENING FOR HYPERLIPIDEMIA: ICD-10-CM

## 2019-07-24 DIAGNOSIS — R79.89 ELEVATED LFTS: ICD-10-CM

## 2019-07-24 DIAGNOSIS — R53.83 FATIGUE, UNSPECIFIED TYPE: ICD-10-CM

## 2019-07-24 LAB
ALBUMIN SERPL-MCNC: 4.9 G/DL (ref 3.4–5)
ALP SERPL-CCNC: 100 U/L (ref 40–150)
ALT SERPL W P-5'-P-CCNC: 56 U/L (ref 0–70)
ANION GAP SERPL CALCULATED.3IONS-SCNC: 7 MMOL/L (ref 3–14)
AST SERPL W P-5'-P-CCNC: 52 U/L (ref 0–45)
BASOPHILS # BLD AUTO: 0.1 10E9/L (ref 0–0.2)
BASOPHILS NFR BLD AUTO: 0.9 %
BILIRUB SERPL-MCNC: 1 MG/DL (ref 0.2–1.3)
BUN SERPL-MCNC: 18 MG/DL (ref 7–30)
CALCIUM SERPL-MCNC: 10.6 MG/DL (ref 8.5–10.1)
CHLORIDE SERPL-SCNC: 97 MMOL/L (ref 94–109)
CO2 SERPL-SCNC: 27 MMOL/L (ref 20–32)
CREAT SERPL-MCNC: 1.52 MG/DL (ref 0.66–1.25)
DIFFERENTIAL METHOD BLD: NORMAL
EOSINOPHIL # BLD AUTO: 0.2 10E9/L (ref 0–0.7)
EOSINOPHIL NFR BLD AUTO: 2.3 %
ERYTHROCYTE [DISTWIDTH] IN BLOOD BY AUTOMATED COUNT: 12.7 % (ref 10–15)
GFR SERPL CREATININE-BSD FRML MDRD: 62 ML/MIN/{1.73_M2}
GLUCOSE SERPL-MCNC: 88 MG/DL (ref 70–99)
HCT VFR BLD AUTO: 47.6 % (ref 40–53)
HGB BLD-MCNC: 16.1 G/DL (ref 13.3–17.7)
LYMPHOCYTES # BLD AUTO: 1.8 10E9/L (ref 0.8–5.3)
LYMPHOCYTES NFR BLD AUTO: 25.9 %
MCH RBC QN AUTO: 30.4 PG (ref 26.5–33)
MCHC RBC AUTO-ENTMCNC: 33.8 G/DL (ref 31.5–36.5)
MCV RBC AUTO: 90 FL (ref 78–100)
MONOCYTES # BLD AUTO: 0.5 10E9/L (ref 0–1.3)
MONOCYTES NFR BLD AUTO: 7.5 %
NEUTROPHILS # BLD AUTO: 4.3 10E9/L (ref 1.6–8.3)
NEUTROPHILS NFR BLD AUTO: 63.4 %
PLATELET # BLD AUTO: 193 10E9/L (ref 150–450)
POTASSIUM SERPL-SCNC: 3.9 MMOL/L (ref 3.4–5.3)
PROT SERPL-MCNC: 9.2 G/DL (ref 6.8–8.8)
RBC # BLD AUTO: 5.3 10E12/L (ref 4.4–5.9)
SODIUM SERPL-SCNC: 131 MMOL/L (ref 133–144)
T4 FREE SERPL-MCNC: 0.96 NG/DL (ref 0.76–1.46)
TROPONIN I SERPL-MCNC: <0.015 UG/L (ref 0–0.04)
TSH SERPL DL<=0.005 MIU/L-ACNC: 8.41 MU/L (ref 0.4–4)
WBC # BLD AUTO: 6.8 10E9/L (ref 4–11)

## 2019-07-24 PROCEDURE — 71046 X-RAY EXAM CHEST 2 VIEWS: CPT | Mod: FY

## 2019-07-24 PROCEDURE — 80053 COMPREHEN METABOLIC PANEL: CPT | Performed by: PHYSICIAN ASSISTANT

## 2019-07-24 PROCEDURE — 84443 ASSAY THYROID STIM HORMONE: CPT | Performed by: PHYSICIAN ASSISTANT

## 2019-07-24 PROCEDURE — 99215 OFFICE O/P EST HI 40 MIN: CPT | Performed by: PHYSICIAN ASSISTANT

## 2019-07-24 PROCEDURE — 83516 IMMUNOASSAY NONANTIBODY: CPT | Performed by: PHYSICIAN ASSISTANT

## 2019-07-24 PROCEDURE — 84484 ASSAY OF TROPONIN QUANT: CPT | Performed by: PHYSICIAN ASSISTANT

## 2019-07-24 PROCEDURE — 36415 COLL VENOUS BLD VENIPUNCTURE: CPT | Performed by: PHYSICIAN ASSISTANT

## 2019-07-24 PROCEDURE — 84439 ASSAY OF FREE THYROXINE: CPT | Performed by: PHYSICIAN ASSISTANT

## 2019-07-24 PROCEDURE — 85025 COMPLETE CBC W/AUTO DIFF WBC: CPT | Performed by: PHYSICIAN ASSISTANT

## 2019-07-24 PROCEDURE — 93000 ELECTROCARDIOGRAM COMPLETE: CPT | Performed by: PHYSICIAN ASSISTANT

## 2019-07-24 ASSESSMENT — MIFFLIN-ST. JEOR: SCORE: 1770.51

## 2019-07-24 ASSESSMENT — PAIN SCALES - GENERAL: PAINLEVEL: NO PAIN (0)

## 2019-07-24 NOTE — PATIENT INSTRUCTIONS
Go to emergency department if develop increasing pain, shortness of breath or other change in symptoms.   Schedule follow up with gastroenterology at [facility] ([phone]) for chronic diarrhea.  Follow up with us if recurs and consider stress testing   Please schedule a fasting lab appointment (nothing to eat or drink 10  hours prior except water and/or your medications) at your earliest convenience.    Follow up with primary care in approximately 2-4 weeks

## 2019-07-25 LAB
TTG IGA SER-ACNC: 1 U/ML
TTG IGG SER-ACNC: <1 U/ML

## 2019-07-25 ASSESSMENT — ASTHMA QUESTIONNAIRES: ACT_TOTALSCORE: 20

## 2019-07-25 NOTE — RESULT ENCOUNTER NOTE
Reviewed results over the phone with patient. Labs suggest may be a little hypothyroid.  Sodium also a little low.  Calcium was a bit high.  Will schedule fasting labs at [facility] and stress echo at [facility] ([phone]).

## 2019-07-28 NOTE — PROGRESS NOTES
Chart reviewed and agree with assessment and plan.  Appointment with cardiology scheduled for 8/5/19.    Kirstie Dubois MD

## 2019-08-05 ENCOUNTER — ANCILLARY PROCEDURE (OUTPATIENT)
Dept: CARDIOLOGY | Facility: CLINIC | Age: 29
End: 2019-08-05
Attending: PHYSICIAN ASSISTANT
Payer: COMMERCIAL

## 2019-08-05 DIAGNOSIS — R07.89 ATYPICAL CHEST PAIN: ICD-10-CM

## 2019-08-05 DIAGNOSIS — R53.83 FATIGUE, UNSPECIFIED TYPE: ICD-10-CM

## 2019-08-05 DIAGNOSIS — E87.1 HYPONATREMIA: ICD-10-CM

## 2019-08-05 DIAGNOSIS — R79.89 ABNORMAL TSH: ICD-10-CM

## 2019-08-05 DIAGNOSIS — E83.52 SERUM CALCIUM ELEVATED: ICD-10-CM

## 2019-08-05 DIAGNOSIS — Z13.220 SCREENING FOR HYPERLIPIDEMIA: ICD-10-CM

## 2019-08-05 DIAGNOSIS — R94.31 ABNORMAL ELECTROCARDIOGRAM: ICD-10-CM

## 2019-08-05 DIAGNOSIS — R79.89 ELEVATED LFTS: ICD-10-CM

## 2019-08-05 LAB
ALBUMIN SERPL-MCNC: 4.4 G/DL (ref 3.4–5)
ALP SERPL-CCNC: 73 U/L (ref 40–150)
ALT SERPL W P-5'-P-CCNC: 41 U/L (ref 0–70)
ANION GAP SERPL CALCULATED.3IONS-SCNC: 6 MMOL/L (ref 3–14)
AST SERPL W P-5'-P-CCNC: 29 U/L (ref 0–45)
BILIRUB SERPL-MCNC: 0.8 MG/DL (ref 0.2–1.3)
BUN SERPL-MCNC: 15 MG/DL (ref 7–30)
CA-I SERPL ISE-MCNC: 5 MG/DL (ref 4.4–5.2)
CALCIUM SERPL-MCNC: 9.7 MG/DL (ref 8.5–10.1)
CHLORIDE SERPL-SCNC: 103 MMOL/L (ref 94–109)
CHOLEST SERPL-MCNC: 235 MG/DL
CO2 SERPL-SCNC: 28 MMOL/L (ref 20–32)
CREAT SERPL-MCNC: 0.89 MG/DL (ref 0.66–1.25)
GFR SERPL CREATININE-BSD FRML MDRD: >90 ML/MIN/{1.73_M2}
GLUCOSE SERPL-MCNC: 91 MG/DL (ref 70–99)
HDLC SERPL-MCNC: 82 MG/DL
LDLC SERPL CALC-MCNC: 131 MG/DL
NONHDLC SERPL-MCNC: 153 MG/DL
POTASSIUM SERPL-SCNC: 3.6 MMOL/L (ref 3.4–5.3)
PROT SERPL-MCNC: 8.1 G/DL (ref 6.8–8.8)
PTH-INTACT SERPL-MCNC: 22 PG/ML (ref 18–80)
SODIUM SERPL-SCNC: 137 MMOL/L (ref 133–144)
T4 FREE SERPL-MCNC: 0.84 NG/DL (ref 0.76–1.46)
TRIGL SERPL-MCNC: 112 MG/DL
TSH SERPL DL<=0.005 MIU/L-ACNC: 5.02 MU/L (ref 0.4–4)

## 2019-08-05 PROCEDURE — 84443 ASSAY THYROID STIM HORMONE: CPT | Performed by: PHYSICIAN ASSISTANT

## 2019-08-05 PROCEDURE — 82330 ASSAY OF CALCIUM: CPT | Performed by: PHYSICIAN ASSISTANT

## 2019-08-05 PROCEDURE — 93016 CV STRESS TEST SUPVJ ONLY: CPT | Performed by: INTERNAL MEDICINE

## 2019-08-05 PROCEDURE — 80061 LIPID PANEL: CPT | Performed by: PHYSICIAN ASSISTANT

## 2019-08-05 PROCEDURE — 83970 ASSAY OF PARATHORMONE: CPT | Performed by: PHYSICIAN ASSISTANT

## 2019-08-05 PROCEDURE — 93350 STRESS TTE ONLY: CPT | Performed by: INTERNAL MEDICINE

## 2019-08-05 PROCEDURE — 93017 CV STRESS TEST TRACING ONLY: CPT | Performed by: INTERNAL MEDICINE

## 2019-08-05 PROCEDURE — 93018 CV STRESS TEST I&R ONLY: CPT | Performed by: INTERNAL MEDICINE

## 2019-08-05 PROCEDURE — 84439 ASSAY OF FREE THYROXINE: CPT | Performed by: PHYSICIAN ASSISTANT

## 2019-08-05 PROCEDURE — 93325 DOPPLER ECHO COLOR FLOW MAPG: CPT | Performed by: INTERNAL MEDICINE

## 2019-08-05 PROCEDURE — 93321 DOPPLER ECHO F-UP/LMTD STD: CPT | Performed by: INTERNAL MEDICINE

## 2019-08-05 PROCEDURE — 86800 THYROGLOBULIN ANTIBODY: CPT | Performed by: PHYSICIAN ASSISTANT

## 2019-08-05 PROCEDURE — 80053 COMPREHEN METABOLIC PANEL: CPT | Performed by: PHYSICIAN ASSISTANT

## 2019-08-05 PROCEDURE — 36415 COLL VENOUS BLD VENIPUNCTURE: CPT | Performed by: PHYSICIAN ASSISTANT

## 2019-08-05 PROCEDURE — 93352 ADMIN ECG CONTRAST AGENT: CPT | Performed by: INTERNAL MEDICINE

## 2019-08-05 PROCEDURE — 86376 MICROSOMAL ANTIBODY EACH: CPT | Performed by: PHYSICIAN ASSISTANT

## 2019-08-05 RX ADMIN — Medication 3 ML: at 15:30

## 2019-08-07 NOTE — RESULT ENCOUNTER NOTE
Please call and advise that stress test was normal.  I am still waiting on some labs and will be in touch once we have all of his results.

## 2019-08-08 ENCOUNTER — TELEPHONE (OUTPATIENT)
Dept: FAMILY MEDICINE | Facility: CLINIC | Age: 29
End: 2019-08-08

## 2019-08-08 DIAGNOSIS — E03.4 HYPOTHYROIDISM DUE TO ACQUIRED ATROPHY OF THYROID: Primary | ICD-10-CM

## 2019-08-08 LAB
THYROGLOB AB SERPL IA-ACNC: <20 IU/ML (ref 0–40)
THYROPEROXIDASE AB SERPL-ACNC: 13 IU/ML

## 2019-08-08 RX ORDER — LEVOTHYROXINE SODIUM 25 UG/1
25 TABLET ORAL DAILY
Qty: 30 TABLET | Refills: 1 | Status: SHIPPED | OUTPATIENT
Start: 2019-08-08 | End: 2019-10-15

## 2019-08-08 NOTE — TELEPHONE ENCOUNTER
Jada returned call    Best number to reach caller: Home number on file [phone] (home)    Is it ok to leave a detailed message: YES

## 2019-08-08 NOTE — TELEPHONE ENCOUNTER
Notes recorded by Odessa Jiménez PA-C on 8/8/2019 at 12:42 PM CDT  Please call and advise that thyroid antibodies were normal.   Repeat calcium level was normal.   Cholesterol was a bit high. Your triglycerides were above normal.  Poor diet, genetics and being overweight can contribute to this.  Maintaining a healthy diet with lean proteins, whole grains and healthy fats such as olive oil as well as regular exercise and maintaining an appropriate weight all contribute to healthier cholesterol levels.    Your electrolytes, blood sugar, kidney function and liver function were normal.    Thyroid function suggests a bit hypothyroid - I would like to start on levothyroxine 25 mcg daily and recheck labs in approximately 8 weeks- find out which pharmacy he would like to use so I can order medication    This writer attempted to contact [facility] on 08/08/19      Reason for call results and left message.      If patient calls back:   Registered Nurse called. Follow Triage Call workflow      Adelina Lagos, GILDAN, RN, PHN

## 2019-08-08 NOTE — RESULT ENCOUNTER NOTE
Please call and advise that thyroid antibodies were normal.   Repeat calcium level was normal.   Cholesterol was a bit high. Your triglycerides were above normal.  Poor diet, genetics and being overweight can contribute to this.  Maintaining a healthy diet with lean proteins, whole grains and healthy fats such as olive oil as well as regular exercise and maintaining an appropriate weight all contribute to healthier cholesterol levels.    Your electrolytes, blood sugar, kidney function and liver function were normal.    Thyroid function suggests a bit hypothyroid - I would like to start on levothyroxine 25 mcg daily and recheck labs in approximately 8 weeks- find out which pharmacy he would like to use so I can order medication

## 2019-08-08 NOTE — LETTER
August 8, 2019      Jada Lion  [address]  [place] [place] [facility] [place] [address]        Dear Jada,     I have enclosed some information on hypothyroidism.   Please schedule an appointment if you have other questions or concerns.   Schedule a lab only appointment in approximately 8 weeks to recheck your thyroid function.       Sincerely,        Odessa Jiménez PA-C

## 2019-08-08 NOTE — TELEPHONE ENCOUNTER
Patient called back and was informed of the below per provider documentation. Patient verbalizes understanding. Patient states he'd like to have Rx sent to [facility] pharmacy on [address] in [place] / hospitals. I could find a [address] in [place]. Pharmacy pended. Patient would also like more information on hypothyroidism and is asking if anything can be sent to him?    Daina Andrea RN

## 2019-08-09 NOTE — TELEPHONE ENCOUNTER
Information mailed to address on file, left message on phone stating info mailed to him to the address on file

## 2019-08-14 DIAGNOSIS — E03.4 HYPOTHYROIDISM DUE TO ACQUIRED ATROPHY OF THYROID: Primary | ICD-10-CM

## 2019-10-07 ENCOUNTER — OFFICE VISIT (OUTPATIENT)
Dept: FAMILY MEDICINE | Facility: CLINIC | Age: 29
End: 2019-10-07
Payer: COMMERCIAL

## 2019-10-07 ENCOUNTER — TELEPHONE (OUTPATIENT)
Dept: FAMILY MEDICINE | Facility: CLINIC | Age: 29
End: 2019-10-07

## 2019-10-07 VITALS
DIASTOLIC BLOOD PRESSURE: 78 MMHG | WEIGHT: 186 LBS | HEIGHT: 66 IN | SYSTOLIC BLOOD PRESSURE: 131 MMHG | TEMPERATURE: 98.8 F | HEART RATE: 66 BPM | BODY MASS INDEX: 29.89 KG/M2 | OXYGEN SATURATION: 97 % | RESPIRATION RATE: 18 BRPM

## 2019-10-07 DIAGNOSIS — L30.9 ECZEMA, UNSPECIFIED TYPE: Primary | ICD-10-CM

## 2019-10-07 DIAGNOSIS — J45.41 MODERATE PERSISTENT ASTHMA WITH EXACERBATION: Primary | ICD-10-CM

## 2019-10-07 DIAGNOSIS — B34.9 VIRAL SYNDROME: ICD-10-CM

## 2019-10-07 DIAGNOSIS — L30.9 ECZEMA, UNSPECIFIED TYPE: ICD-10-CM

## 2019-10-07 PROCEDURE — 99214 OFFICE O/P EST MOD 30 MIN: CPT | Performed by: PHYSICIAN ASSISTANT

## 2019-10-07 RX ORDER — HYDROCORTISONE 2.5 %
CREAM (GRAM) TOPICAL
Qty: 30 G | Refills: 0 | Status: SHIPPED | OUTPATIENT
Start: 2019-10-07 | End: 2024-04-11

## 2019-10-07 RX ORDER — HYDROXYZINE PAMOATE 25 MG/1
CAPSULE ORAL
Qty: 30 CAPSULE | Refills: 1 | Status: SHIPPED | OUTPATIENT
Start: 2019-10-07 | End: 2023-08-19

## 2019-10-07 RX ORDER — HYDROXYZINE HYDROCHLORIDE 25 MG/1
25 TABLET, FILM COATED ORAL 3 TIMES DAILY PRN
Qty: 30 TABLET | Refills: 1 | Status: SHIPPED | OUTPATIENT
Start: 2019-10-07 | End: 2019-11-12

## 2019-10-07 RX ORDER — PREDNISONE 10 MG/1
TABLET ORAL
Qty: 30 TABLET | Refills: 0 | Status: SHIPPED | OUTPATIENT
Start: 2019-10-07 | End: 2019-11-12

## 2019-10-07 ASSESSMENT — MIFFLIN-ST. JEOR: SCORE: 1761.44

## 2019-10-07 ASSESSMENT — PAIN SCALES - GENERAL: PAINLEVEL: NO PAIN (0)

## 2019-10-07 NOTE — TELEPHONE ENCOUNTER
hydrOXYzine (VISTARIL)-pamoate 25 MG capsule on indefinite backorder.    Pharmacy requesting alternative

## 2019-10-07 NOTE — PROGRESS NOTES
Subjective     Jada Lion is a 29 year old male who presents to clinic today for the following health issues:    HPI   Asthma Follow-Up  Cough with mucus x4-5 day. Tried Mucinex, Theraflu, Robitussin, Acetaminophen tried and failed    Was ACT completed today?    Yes    ACT Total Scores 10/7/2019   ACT TOTAL SCORE -   ASTHMA ER VISITS -   ASTHMA HOSPITALIZATIONS -   ACT TOTAL SCORE (Goal Greater than or Equal to 20) 9   In the past 12 months, how many times did you visit the emergency room for your asthma without being admitted to the hospital? 0   In the past 12 months, how many times were you hospitalized overnight because of your asthma? 0       How many days per week do you miss taking your asthma controller medication?  0    Please describe any recent triggers for your asthma: upper respiratory infections    Have you had any Emergency Room Visits, Urgent Care Visits, or Hospital Admissions since your last office visit?  No          How many servings of fruits and vegetables do you eat daily?  2-3    On average, how many sweetened beverages do you drink each day (soda, juice, sweet tea, etc)?   3    How many days per week do you miss taking your medication? 0        Rash  Onset: Summer 2019    Description:   Location: neck, both arms-worst on left arm, back, around eyes  Character: white patches, dry, flakey, pus, redness  Itching (Pruritis): YES    Progression of Symptoms:  worsening    Accompanying Signs & Symptoms:  Fever: no   Body aches or joint pain: no   Sore throat symptoms: no   Recent cold symptoms: YES    History:   Previous similar rash: red around eyes started after Lasix eye surgery 8/2019    Precipitating factors:   Exposure to similar rash: no   New exposures: None   Recent travel: no     Alleviating factors:  none    Therapies Tried and outcome: Cortizone and Quadrozone itch cream    Had excema when younger      Allergies   Allergen Reactions     Singulair [Montelukast]      Abdominal pain  "      Patient Active Problem List   Diagnosis     Moderate persistent asthma     Seasonal allergic rhinitis     Plantar fasciitis     Overweight (BMI 25.0-29.9)     CARDIOVASCULAR SCREENING; LDL GOAL LESS THAN 160     Moderate persistent asthma without complication     Hip pain, left     Tobacco use disorder     Rotator cuff injury, right, initial encounter     Cheilitis     Dry eyes     Acute left-sided low back pain without sciatica       History reviewed. No pertinent past medical history.    albuterol (PROAIR RESPICLICK) 108 (90 Base) MCG/ACT inhaler, Inhale 1-2 puffs into the lungs every 4 hours as needed for shortness of breath / dyspnea or wheezing  albuterol (PROVENTIL) (2.5 MG/3ML) 0.083% neb solution, INHALE 1 VIAL VIA NEB EVERY 6 HOURS AS NEEDED FOR SHORTNESS OF BREATH/WHEEZING  fluticasone-salmeterol (ADVAIR) 500-50 MCG/DOSE inhaler, Inhale 1 puff into the lungs 2 times daily  levothyroxine (SYNTHROID/LEVOTHROID) 25 MCG tablet, Take 1 tablet (25 mcg) by mouth daily    No current facility-administered medications on file prior to visit.       Social History     Tobacco Use     Smoking status: Current Every Day Smoker     Packs/day: 0.20     Smokeless tobacco: Never Used   Substance Use Topics     Alcohol use: Yes       Family History   Problem Relation Age of Onset     Diabetes Mother      Hypertension Mother      Asthma Mother      Other Cancer Maternal Grandfather      Other Cancer Paternal Grandfather         unsure if heart disease      Coronary Artery Disease No family hx of        ROS:  Consitutional: As above  ENT: As above  Respiratory: As above  SKIN: rash as above    OBJECTIVE:  /78 (BP Location: Right arm, Patient Position: Chair, Cuff Size: Adult Large)   Pulse 66   Temp 98.8  F (37.1  C) (Oral)   Resp 18   Ht 1.676 m (5' 6\")   Wt 84.4 kg (186 lb)   SpO2 97%   BMI 30.02 kg/m    GENERAL APPEARANCE: healthy, alert and no distress  EYES: conjunctiva clear  HENT: TMs w/o erythema, " effusion or bulging.  Nose and mouth without ulcers, erythema or lesions.  NO tonsillar enlargement erythema or exudates.   NECK: supple, nontender, no lymphadenopathy  RESP: lungs clear to auscultation - no rales, rhonchi or wheezes  CV: regular rates and rhythm, normal S1 S2, no murmur noted  NEURO: awake, alert    SKIN: there are .5to 1.5 cm pacthes of mac pap lesion scattered across body with signs of scracthing.  There is a 1/2 palm sized macular puffy slightly raised patch on the right sobia eof neck.          ASSESSMENT: Well appearing.    ICD-10-CM    1. Moderate persistent asthma with exacerbation J45.41 predniSONE (DELTASONE) 10 MG tablet   2. Eczema, unspecified type L30.9 predniSONE (DELTASONE) 10 MG tablet     hydrocortisone 2.5 % cream     hydrOXYzine (VISTARIL) 25 MG capsule   3. Viral syndrome B34.9          PLAN:  Lots of rest and fluids.  RTC if any worsening symptoms or if not improving.    Marcel Feng PA-C

## 2019-10-07 NOTE — PATIENT INSTRUCTIONS
At [facility], we strive to deliver an exceptional experience to you, every time we see you.  If you receive a survey in the mail, please send us back your thoughts. We really do value your feedback.    Based on your medical history, these are the current health maintenance/preventive care services that you are due for (some may have been done at this visit.)  Health Maintenance Due   Topic Date Due     PREVENTIVE CARE VISIT  1992     HIV SCREENING  07/21/2007     PNEUMOCOCCAL IMMUNIZATION 19-64 MEDIUM RISK (1 of 1 - PPSV23) 07/21/2011     INFLUENZA VACCINE (1) 09/01/2019         Suggested websites for health information:  Www.[facility].org : Up to date and easily searchable information on multiple topics.  Www.medlineplus.gov : medication info, interactive tutorials, watch real surgeries online  Www.familydoctor.org : good info from the Academy of Family Physicians  Www.cdc.gov : public health info, travel advisories, epidemics (H1N1)  Www.aap.org : children's health info, normal development, vaccinations  Www.health.[facility].[place].us : [place] dept of health, public health issues in [place], N1N1    Your care team:                            Family Medicine Internal Medicine   MD Michael Ma MD Shantel Branch-Fleming, MD Katya Georgiev PA-C Nam Ho, MD Pediatrics   CHAVA Corado, JAMISON Galeano APRRAFFI CNP   MD Krista Lawrence MD Deborah Mielke, MD Kim Thein, APRN [facility]      Clinic hours: Monday - Thursday 7 am-7 pm; Fridays 7 am-5 pm.   Urgent care: Monday - Friday 11 am-9 pm; Saturday and Sunday 9 am-5 pm.  Pharmacy : Monday -Thursday 8 am-8 pm; Friday 8 am-6 pm; Saturday and Sunday 9 am-5 pm.     Clinic: [phone]   Pharmacy: [phone]      Patient Education     Asthma (Adult)  Asthma is a disease where the medium and  small air passages within the lung go into spasm and restrict the flow of air. Inflammation and swelling of  "the airways cause further blockage. During an acute asthma attack, these factors cause trouble breathing, wheezing, cough and chest tightness.    An asthma attack can be triggered by many things. Common triggers include infections such as the common cold, bronchitis, and pneumonia. Irritants such as smoke or pollutants in the air, very cold air, emotional upset, and exercise can also trigger an attack. In many adults with asthma, allergies to dust, mold, pollen and animal dander can cause an asthma attack. Skipping doses of daily asthma medicine can also bring on an asthma attack.  Asthma can be controlled using the proper medicines prescribed by your healthcare provider and avoiding exposure to known triggers including allergens and irritants.  Home care    Take prescribed medicine exactly at the times advised. If you need medicine such as from a hand held inhaler or aerosol breathing machine more than every 4 hours, contact your healthcare provider or seek immediate medical attention. If prescribed an antibiotic or prednisone, take all of the medicine as prescribed, even if you are feeling better after a few days.    Don't smoke. Avoid being exposed to the smoke of others.    Some people with asthma have worsening of their symptoms when they take aspirin and non-steroidal or fever-reducing medicines like ibuprofen and naproxen. Talk to your healthcare provider if you think this may apply to you.  Follow-up care  Follow up with your healthcare provider, or as advised. Always bring all of your current medicines to any appointments with your healthcare provider. Also bring a complete list of medicines even those not taken for asthma. If you don't already have one, talk to your healthcare provider about developing your own \"Asthma Action Plan.\"  A pneumococcal (pneumonia) vaccine and yearly flu shot (every fall) are recommended. Ask your doctor about this.  When to seek medical advice  Call your healthcare provider " right away if any of these occur:     Increased wheezing or shortness of breath    Need to use your inhalers more often than usual without relief    Fever of 100.4 F (38 C) or higher, or as directed by your healthcare provider    Coughing up lots of dark-colored or bloody sputum (mucus)    Chest pain with each breath    If you use a peak flow meter as part of an Asthma Action Plan, and you are still in the yellow zone (50% to 80%) 15 minutes after using inhaler medicine.  Call 911  Call 911 if any of the following occur    Trouble walking or talking because of shortness of breath    If you use a peak flow meter as part of an Asthma Action Plan and you are still in the red zone (less than 50%) 15 minutes after using inhaler medicine    Lips or fingernails turning gray or blue  Date Last Reviewed: 5/1/2017 2000-2018 [employer]. [address] [place] [address]. All rights reserved. This information is not intended as a substitute for professional medical care. Always follow your healthcare professional's instructions.           Patient Education     Atopic Dermatitis (Adult)  Atopic dermatitis is a dry, itchy, red rash. It s also called eczema. The rash is chronic, or ongoing. It can come and go over time. The disease is often passed down in families. It causes a problem with the skin barrier that makes the skin more sensitive to the environment and other factors. The increased skin sensitivity causes an itch, which causes scratching. Scratching can worsen the itching or also break the skin. This can put the skin at risk of infection.  The condition is most common in people with asthma, hay fever, hives, or dry or sensitive skin. The rash may be caused by extreme heat or heavy sweating. Skin irritants can cause the rash to flare up. These can include wool or silk clothing, grease, oils, some medicines, and harsh soaps and detergents. Emotional stress can also be a trigger.  Treatment is done  to relieve the itching and inflammation of the skin.  Home care  Follow these tips to care for your condition:    Keep the areas of rash clean by bathing at least every other day. Use lukewarm water to bathe. Don t use hot water, which can dry out the skin.    Don t use soaps with strong detergents. Use mild soaps made for sensitive skin.    Apply a cream or ointment to damp skin right after bathing.    Avoid things that irritate your skin. Wear absorbent, soft fabrics next to the skin rather than rough or scratchy materials.    Use mild laundry soap free of scents and perfumes. Make sure to rinse all the soap out of your clothes.    Treat any skin infection as directed.    Use oral diphenhydramine to help reduce itching. This is an antihistamine you can buy at drug and grocery stores. It can make you sleepy, so use lower doses during the daytime. Or you can use loratadine. This is an antihistamine that will not make you sleepy. Do not use diphenhydramine if you have glaucoma or have trouble urinating due to an enlarged prostate.  Follow-up care  See your healthcare provider, or as advised. If your symptoms don t get better or if they get worse in the next 7 days, make an appointment with your healthcare provider.  When to seek medical advice  Call your healthcare provider right away  if any of these occur:    Increasing area of redness or pain in the skin    Yellow crusts or wet drainage from the rash    Fever of 100.4 F (38 C) or higher, or as directed by your healthcare provider  Date Last Reviewed: 9/1/2016 2000-2018 [employer]. [address] [place] [address], [place] [address]. All rights reserved. This information is not intended as a substitute for professional medical care. Always follow your healthcare professional's instructions.

## 2019-10-08 ASSESSMENT — ASTHMA QUESTIONNAIRES: ACT_TOTALSCORE: 9

## 2019-10-09 DIAGNOSIS — E03.4 HYPOTHYROIDISM DUE TO ACQUIRED ATROPHY OF THYROID: ICD-10-CM

## 2019-10-09 NOTE — TELEPHONE ENCOUNTER
"Requested Prescriptions   Pending Prescriptions Disp Refills     levothyroxine (SYNTHROID/LEVOTHROID) 25 MCG tablet 30 tablet 1     Sig: Take 1 tablet (25 mcg) by mouth daily       Thyroid Protocol Failed - 10/9/2019  2:29 PM        Failed - Normal TSH on file in past 12 months     Recent Labs   Lab Test 08/05/19  1506   TSH 5.02*              Passed - Patient is 12 years or older        Passed - Recent (12 mo) or future (30 days) visit within the authorizing provider's specialty     Patient has had an office visit with the authorizing provider or a provider within the authorizing providers department within the previous 12 mos or has a future within next 30 days. See \"Patient Info\" tab in inbasket, or \"Choose Columns\" in Meds & Orders section of the refill encounter.              Passed - Medication is active on med list        levothyroxine (SYNTHROID/LEVOTHROID) 25 MCG tablet  Last Written Prescription Date:  8/8/19  Last Fill Quantity: 30,  # refills: 1   Last office visit: 10/7/2019 with prescribing provider:  Odessa Jiménez   Future Office Visit:      "

## 2019-10-14 NOTE — TELEPHONE ENCOUNTER
12 hr cc   Routing refill request to provider for review/approval because:            Barbie Bishop RN

## 2019-10-15 RX ORDER — LEVOTHYROXINE SODIUM 25 UG/1
25 TABLET ORAL DAILY
Qty: 30 TABLET | Refills: 1 | Status: SHIPPED | OUTPATIENT
Start: 2019-10-15 | End: 2019-11-12

## 2019-11-12 ENCOUNTER — OFFICE VISIT (OUTPATIENT)
Dept: FAMILY MEDICINE | Facility: CLINIC | Age: 29
End: 2019-11-12
Payer: COMMERCIAL

## 2019-11-12 VITALS
BODY MASS INDEX: 29.57 KG/M2 | WEIGHT: 184 LBS | HEART RATE: 56 BPM | DIASTOLIC BLOOD PRESSURE: 69 MMHG | HEIGHT: 66 IN | RESPIRATION RATE: 12 BRPM | SYSTOLIC BLOOD PRESSURE: 105 MMHG | TEMPERATURE: 98 F | OXYGEN SATURATION: 98 %

## 2019-11-12 DIAGNOSIS — E03.4 HYPOTHYROIDISM DUE TO ACQUIRED ATROPHY OF THYROID: ICD-10-CM

## 2019-11-12 DIAGNOSIS — J45.41 MODERATE PERSISTENT ASTHMA WITH ACUTE EXACERBATION: Primary | ICD-10-CM

## 2019-11-12 DIAGNOSIS — Z23 ENCOUNTER FOR IMMUNIZATION: ICD-10-CM

## 2019-11-12 LAB — TSH SERPL DL<=0.005 MIU/L-ACNC: 3.12 MU/L (ref 0.4–4)

## 2019-11-12 PROCEDURE — 84443 ASSAY THYROID STIM HORMONE: CPT | Performed by: FAMILY MEDICINE

## 2019-11-12 PROCEDURE — 99214 OFFICE O/P EST MOD 30 MIN: CPT | Mod: 25 | Performed by: FAMILY MEDICINE

## 2019-11-12 PROCEDURE — 90471 IMMUNIZATION ADMIN: CPT | Performed by: FAMILY MEDICINE

## 2019-11-12 PROCEDURE — 90686 IIV4 VACC NO PRSV 0.5 ML IM: CPT | Performed by: FAMILY MEDICINE

## 2019-11-12 PROCEDURE — 36415 COLL VENOUS BLD VENIPUNCTURE: CPT | Performed by: FAMILY MEDICINE

## 2019-11-12 RX ORDER — LEVOTHYROXINE SODIUM 25 UG/1
25 TABLET ORAL DAILY
Qty: 90 TABLET | Refills: 3 | Status: CANCELLED | OUTPATIENT
Start: 2019-11-12

## 2019-11-12 ASSESSMENT — PAIN SCALES - GENERAL: PAINLEVEL: NO PAIN (0)

## 2019-11-12 ASSESSMENT — MIFFLIN-ST. JEOR: SCORE: 1752.37

## 2019-11-12 NOTE — PROGRESS NOTES
Subjective     Jada Lion is a 29 year old male who presents to clinic today for the following health issues:    HPI   Asthma Follow-Up    Was ACT completed today?    Yes    ACT Total Scores 10/7/2019   ACT TOTAL SCORE -   ASTHMA ER VISITS -   ASTHMA HOSPITALIZATIONS -   ACT TOTAL SCORE (Goal Greater than or Equal to 20) 9   In the past 12 months, how many times did you visit the emergency room for your asthma without being admitted to the hospital? 0   In the past 12 months, how many times were you hospitalized overnight because of your asthma? 0       How many days per week do you miss taking your asthma controller medication?  0    Please describe any recent triggers for your asthma: dust, cold air, outside environment    Have you had any Emergency Room Visits, Urgent Care Visits, or Hospital Admissions since your last office visit?  No      How many servings of fruits and vegetables do you eat daily?  0-1    On average, how many sweetened beverages do you drink each day (soda, juice, sweet tea, etc)?   3  Monsters a day.    How many days per week do you miss taking your medication? 0          Patient Active Problem List   Diagnosis     Moderate persistent asthma     Seasonal allergic rhinitis     Plantar fasciitis     Overweight (BMI 25.0-29.9)     CARDIOVASCULAR SCREENING; LDL GOAL LESS THAN 160     Moderate persistent asthma without complication     Hip pain, left     Tobacco use disorder     Rotator cuff injury, right, initial encounter     Cheilitis     Dry eyes     Acute left-sided low back pain without sciatica     Past Surgical History:   Procedure Laterality Date     DENTAL SURGERY  1-2015    wisdom teeth        Social History     Tobacco Use     Smoking status: Current Every Day Smoker     Packs/day: 0.20     Smokeless tobacco: Never Used   Substance Use Topics     Alcohol use: Yes     Family History   Problem Relation Age of Onset     Diabetes Mother      Hypertension Mother      Asthma Mother   "    Other Cancer Maternal Grandfather      Other Cancer Paternal Grandfather         unsure if heart disease      Coronary Artery Disease No family hx of            Reviewed and updated as needed this visit by Provider         Review of Systems   ROS COMP: Constitutional, HEENT, cardiovascular, pulmonary, GI, , musculoskeletal, neuro, skin, endocrine and psych systems are negative, except as otherwise noted.      Objective    /69 (BP Location: Left arm, Patient Position: Sitting, Cuff Size: Adult Large)   Pulse 56   Temp 98  F (36.7  C) (Oral)   Resp 12   Ht 1.676 m (5' 6\")   Wt 83.5 kg (184 lb)   SpO2 98%   BMI 29.70 kg/m    Body mass index is 29.7 kg/m .  Physical Exam   GENERAL: healthy, alert and no distress  NECK: no adenopathy, no asymmetry, masses, or scars and thyroid normal to palpation  RESP: lungs clear to auscultation - no rales, rhonchi or wheezes  CV: regular rate and rhythm, normal S1 S2, no S3 or S4, no murmur, click or rub, no peripheral edema and peripheral pulses strong  ABDOMEN: soft, nontender, no hepatosplenomegaly, no masses and bowel sounds normal  MS: no gross musculoskeletal defects noted, no edema    Diagnostic Test Results:  Labs reviewed in Epic        Assessment & Plan     (J45.41) Moderate persistent asthma with acute exacerbation  (primary encounter diagnosis)  Comment:   Plan: albuterol (PROAIR RESPICLICK) 108 (90 Base)         MCG/ACT inhaler, fluticasone-salmeterol         (ADVAIR) 500-50 MCG/DOSE inhaler        Controlled. Continue with current medications.    (E03.4) Hypothyroidism due to acquired atrophy of thyroid  Comment:   Plan: TSH with free T4 reflex        Patient not taking levothyroxine. He thought this caused diarrhea. Will recheck today.    (Z23) Encounter for immunization  Comment:   Plan: ADMIN 1st VACCINE, FLU VAC, QUADRIVALENT (RIV4)        RECOMBINANT DNA, IM                 Tobacco Cessation:   reports that he has been smoking. He has been smoking " "about 0.20 packs per day. He has never used smokeless tobacco.  Tobacco Cessation Action Plan: Information offered: Patient not interested at this time      BMI:   Estimated body mass index is 29.7 kg/m  as calculated from the following:    Height as of this encounter: 1.676 m (5' 6\").    Weight as of this encounter: 83.5 kg (184 lb).           Regular exercise  See Patient Instructions    Return in about 1 year (around 11/12/2020) for Physical Exam.    Destin Fischer MD, MD  [facility]      "

## 2019-11-12 NOTE — PATIENT INSTRUCTIONS
At [facility], we strive to deliver an exceptional experience to you, every time we see you.  If you receive a survey in the mail, please send us back your thoughts. We really do value your feedback.    Based on your medical history, these are the current health maintenance/preventive care services that you are due for (some may have been done at this visit.)  Health Maintenance Due   Topic Date Due     PREVENTIVE CARE VISIT  1992     HIV SCREENING  07/21/2007     PNEUMOCOCCAL IMMUNIZATION 19-64 MEDIUM RISK (1 of 1 - PPSV23) 07/21/2011     INFLUENZA VACCINE (1) 09/01/2019         Suggested websites for health information:  Www.[employer].org : Up to date and easily searchable information on multiple topics.  Www.medlineplus.gov : medication info, interactive tutorials, watch real surgeries online  Www.familydoctor.org : good info from the Academy of Family Physicians  Www.cdc.gov : public health info, travel advisories, epidemics (H1N1)  Www.aap.org : children's health info, normal development, vaccinations  Www.health.[facility].[place].us : [place] dept of health, public health issues in [place], N1N1    Your care team:                            Family Medicine Internal Medicine   MD Michael Ma MD Shantel Branch-Fleming, MD Katya Georgiev PA-C Nam Ho, MD Pediatrics   CHAVA Corado, JAMISON Galeano APRMD Krista Tam CNP, MD Deborah Mielke, MD Kim Thein, APRN [facility]      Clinic hours: Monday - Thursday 7 am-7 pm; Fridays 7 am-5 pm.   Urgent care: Monday - Friday 11 am-9 pm; Saturday and Sunday 9 am-5 pm.  Pharmacy : Monday -Thursday 8 am-8 pm; Friday 8 am-6 pm; Saturday and Sunday 9 am-5 pm.     Clinic: [phone]   Pharmacy: [phone]

## 2019-11-12 NOTE — LETTER
November 13, 2019      Jada Lion  [address]  [place] [address]        Dear Jada Lion    Your thyroid test was normal. No need for the thyroid medication. We will monitor this yearly.      Enclosed is a copy of the results.  It was a pleasure to see you at your last appointment.    If you have any questions or concerns, please call myself or my nurse at [phone].      Sincerely,      Destin Fischer MD/WILBER Roberts MA      Results for orders placed or performed in visit on 11/12/19   TSH with free T4 reflex     Status: None   Result Value Ref Range    TSH 3.12 0.40 - 4.00 mU/L

## 2019-11-13 ASSESSMENT — ASTHMA QUESTIONNAIRES: ACT_TOTALSCORE: 23

## 2019-12-11 ENCOUNTER — OFFICE VISIT (OUTPATIENT)
Dept: FAMILY MEDICINE | Facility: CLINIC | Age: 29
End: 2019-12-11
Payer: COMMERCIAL

## 2019-12-11 VITALS
HEIGHT: 66 IN | SYSTOLIC BLOOD PRESSURE: 117 MMHG | RESPIRATION RATE: 16 BRPM | DIASTOLIC BLOOD PRESSURE: 76 MMHG | TEMPERATURE: 96 F | HEART RATE: 57 BPM | BODY MASS INDEX: 29.99 KG/M2 | OXYGEN SATURATION: 96 % | WEIGHT: 186.6 LBS

## 2019-12-11 DIAGNOSIS — L30.9 DERMATITIS: Primary | ICD-10-CM

## 2019-12-11 DIAGNOSIS — J45.40 MODERATE PERSISTENT ASTHMA WITHOUT COMPLICATION: ICD-10-CM

## 2019-12-11 PROCEDURE — 99214 OFFICE O/P EST MOD 30 MIN: CPT | Performed by: PREVENTIVE MEDICINE

## 2019-12-11 RX ORDER — PREDNISONE 20 MG/1
20 TABLET ORAL 2 TIMES DAILY
Qty: 10 TABLET | Refills: 0 | Status: SHIPPED | OUTPATIENT
Start: 2019-12-11 | End: 2019-12-16

## 2019-12-11 RX ORDER — ALBUTEROL SULFATE 90 UG/1
2 AEROSOL, METERED RESPIRATORY (INHALATION) EVERY 6 HOURS
Qty: 3 INHALER | Refills: 3 | Status: SHIPPED | OUTPATIENT
Start: 2019-12-11 | End: 2022-08-24

## 2019-12-11 RX ORDER — ALBUTEROL SULFATE 0.83 MG/ML
SOLUTION RESPIRATORY (INHALATION)
Qty: 150 ML | Refills: 1 | Status: SHIPPED | OUTPATIENT
Start: 2019-12-11 | End: 2020-07-15

## 2019-12-11 RX ORDER — TRIAMCINOLONE ACETONIDE 5 MG/G
OINTMENT TOPICAL
Qty: 45 G | Refills: 0 | Status: SHIPPED | OUTPATIENT
Start: 2019-12-11 | End: 2024-01-06

## 2019-12-11 ASSESSMENT — PAIN SCALES - GENERAL: PAINLEVEL: NO PAIN (0)

## 2019-12-11 ASSESSMENT — MIFFLIN-ST. JEOR: SCORE: 1764.16

## 2019-12-11 NOTE — PATIENT INSTRUCTIONS
At [facility], we strive to deliver an exceptional experience to you, every time we see you. If you receive a survey, please complete it as we do value your feedback.  If you have MyChart, you can expect to receive results automatically within 24 hours of their completion.  Your provider will send a note interpreting your results as well.   If you do not have MyChart, you should receive your results in about a week by mail.    Your care team:                            Family Medicine Internal Medicine   MD Michael Ma MD Shantel Branch-Fleming, MD Katya Georgiev PA-C Megan Hill, APRRAFFI Fischer, MD Pediatrics   Marcel Feng, PAJerryC  Libertad Kraus, MD Kira Roberson APRN CNP   MD Krista Lawrence MD Deborah Mielke, MD Kim Thein, APRN CNP      Clinic hours: Monday - Thursday 7 am-7 pm; Fridays 7 am-5 pm.   Urgent care: Monday - Friday 11 am-9 pm; Saturday and Sunday 9 am-5 pm.  Pharmacy : Monday -Thursday 8 am-8 pm; Friday 8 am-6 pm; Saturday and Sunday 9 am-5 pm.     Clinic: [phone]   Pharmacy: [phone]

## 2019-12-11 NOTE — PROGRESS NOTES
.Subjective     Jada Lion is a 29 year old male who presents to clinic today for the following health issues:    HPI   Rash  Onset: 5 months    Description:   Location: all over   Character: round, burning, red  Itching (Pruritis): YES    Progression of Symptoms:  worsening    Accompanying Signs & Symptoms:  Fever: no   Body aches or joint pain: no   Sore throat symptoms: no   Recent cold symptoms: no     History:   Previous similar rash: no     Precipitating factors:   Exposure to similar rash: no   New exposures: None and medication    Recent travel: YES- [place]     Alleviating factors:  None     Therapies Tried and outcome:   Creams       Started on his back  Possible bites  Was hunting in [place]  Symptoms increasing, now also on his legs and groin   Burning itching  Some bloody drainage  No fever  No red flags such as wheezing, no shortness of breath, no tongue or mouth swelling   Itching is not more at night  No family members with a rash     Asthma:  -stable  -wondering why his medication was changed to Albuterol Respiclick   -would like his script to be for regular Albuterol     Patient Active Problem List   Diagnosis     Moderate persistent asthma     Seasonal allergic rhinitis     Plantar fasciitis     Overweight (BMI 25.0-29.9)     CARDIOVASCULAR SCREENING; LDL GOAL LESS THAN 160     Moderate persistent asthma without complication     Hip pain, left     Tobacco use disorder     Rotator cuff injury, right, initial encounter     Cheilitis     Dry eyes     Acute left-sided low back pain without sciatica     Past Surgical History:   Procedure Laterality Date     DENTAL SURGERY  1-2015    wisdom teeth        Social History     Tobacco Use     Smoking status: Current Every Day Smoker     Packs/day: 0.20     Smokeless tobacco: Never Used   Substance Use Topics     Alcohol use: Yes     Family History   Problem Relation Age of Onset     Diabetes Mother      Hypertension Mother      Asthma Mother       "Other Cancer Maternal Grandfather      Other Cancer Paternal Grandfather         unsure if heart disease      Coronary Artery Disease No family hx of          Current Outpatient Medications   Medication Sig Dispense Refill     albuterol (PROAIR HFA/PROVENTIL HFA/VENTOLIN HFA) 108 (90 Base) MCG/ACT inhaler Inhale 2 puffs into the lungs every 6 hours 3 Inhaler 3     albuterol (PROVENTIL) (2.5 MG/3ML) 0.083% neb solution INHALE 1 VIAL VIA NEB EVERY 6 HOURS AS NEEDED FOR SHORTNESS OF BREATH/WHEEZING 150 mL 1     fluticasone-salmeterol (ADVAIR) 500-50 MCG/DOSE inhaler Inhale 1 puff into the lungs 2 times daily 1 Inhaler 11     hydrocortisone 2.5 % cream Apply BID to affected region(s) for 7-10 days. 30 g 0     hydrOXYzine (VISTARIL) 25 MG capsule 1-2 tabs every 6 hours prn itch or rash 30 capsule 1     order for DME Equipment being ordered: Nebulizer, tubing and mask 1 Device 0     predniSONE (DELTASONE) 20 MG tablet Take 1 tablet (20 mg) by mouth 2 times daily for 5 days 10 tablet 0     triamcinolone (KENALOG) 0.5 % external ointment Apply to affected areas 2 times a day for a maximum of 2 weeks 45 g 0     Allergies   Allergen Reactions     Singulair [Montelukast]      Abdominal pain     BP Readings from Last 3 Encounters:   12/11/19 117/76   11/12/19 105/69   10/07/19 131/78    Wt Readings from Last 3 Encounters:   12/11/19 84.6 kg (186 lb 9.6 oz)   11/12/19 83.5 kg (184 lb)   10/07/19 84.4 kg (186 lb)           Reviewed and updated as needed this visit by Provider  Tobacco  Allergies  Meds  Problems  Med Hx  Surg Hx  Fam Hx         Review of Systems   ROS COMP: Constitutional, HEENT, cardiovascular, pulmonary, gi and gu systems are negative, except as otherwise noted.      Objective    /76 (BP Location: Left arm, Patient Position: Chair, Cuff Size: Adult Regular)   Pulse 57   Temp 96  F (35.6  C) (Oral)   Resp 16   Ht 1.676 m (5' 6\")   Wt 84.6 kg (186 lb 9.6 oz)   SpO2 96%   BMI 30.12 kg/m    Body " mass index is 30.12 kg/m .  Physical Exam   GENERAL APPEARANCE: healthy, alert and no distress  EYES: Eyes grossly normal to inspection and conjunctivae and sclerae normal  RESP: lungs clear to auscultation - no rales, rhonchi or wheezes  CV: regular rates and rhythm, normal S1 S2, no S3 or S4 and no murmur, click or rub  ABDOMEN: soft, non-tender and no rebound or guarding   MS: extremities normal- no gross deformities noted and peripheral pulses normal  SKIN: Multiple lesions on the back, upper extremities, some on the lower extremities, abdomen, and groin. Central punctum with varying degrees of surrounding redness, central scabs in some lesions, lots of excoriation, no drainage, no abscesses, some lesions are also scaly, no lesions in the interdigital areas   NEURO: Normal strength and tone, mentation intact and speech normal  PSYCH: mentation appears normal      Diagnostic Test Results:  Labs reviewed in Epic  No results found for this or any previous visit (from the past 24 hour(s)).        Assessment & Plan     Jada was seen today for derm problem.    Diagnoses and all orders for this visit:    Dermatitis  -     predniSONE (DELTASONE) 20 MG tablet; Take 1 tablet (20 mg) by mouth 2 times daily for 5 days  -     triamcinolone (KENALOG) 0.5 % external ointment; Apply to affected areas 2 times a day for a maximum of 2 weeks  -     DERMATOLOGY REFERRAL  -looks like insect bites with central puncti  -will defer antibiotics for now  -lesions are very itchy so will use oral and topical Prednisone  -Cetrizine 10 mg twice a day, non sedating     Moderate persistent asthma without complication  -     albuterol (PROAIR HFA/PROVENTIL HFA/VENTOLIN HFA) 108 (90 Base) MCG/ACT inhaler; Inhale 2 puffs into the lungs every 6 hours  -     albuterol (PROVENTIL) (2.5 MG/3ML) 0.083% neb solution; INHALE 1 VIAL VIA NEB EVERY 6 HOURS AS NEEDED FOR SHORTNESS OF BREATH/WHEEZING  -     order for DME; Equipment being ordered:  Nebulizer, tubing and mask      Return in about 2 weeks (around 12/25/2019) for Routine Visit with Dermatology .    Liliam Hernández MD MPH    [facility]

## 2019-12-18 ENCOUNTER — OFFICE VISIT (OUTPATIENT)
Dept: DERMATOLOGY | Facility: CLINIC | Age: 29
End: 2019-12-18
Attending: PREVENTIVE MEDICINE

## 2019-12-18 DIAGNOSIS — L30.0 NUMMULAR ECZEMA: Primary | ICD-10-CM

## 2019-12-18 PROCEDURE — 99203 OFFICE O/P NEW LOW 30 MIN: CPT | Performed by: DERMATOLOGY

## 2019-12-18 RX ORDER — TRIAMCINOLONE ACETONIDE 1 MG/G
OINTMENT TOPICAL
Qty: 453.6 G | Refills: 2 | Status: SHIPPED | OUTPATIENT
Start: 2019-12-18 | End: 2022-11-16

## 2019-12-18 ASSESSMENT — PAIN SCALES - GENERAL: PAINLEVEL: NO PAIN (0)

## 2019-12-18 NOTE — NURSING NOTE
Jada Lion's goals for this visit include:   Chief Complaint   Patient presents with     Derm Problem     Rash x a few months. Has used prednisone and triamcinolone without relief. Family hx eczema       He requests these members of his care team be copied on today's visit information: no    PCP: Destin Fischer    Referring Provider:  Liliam Hernández MD  [address] [address]    There were no vitals taken for this visit.    Do you need any medication refills at today's visit? No  Tessa Campbell LPN

## 2019-12-18 NOTE — PATIENT INSTRUCTIONS
In the shower, only apply soap to areas of the body that become sweaty, such as the underarms and groin area. For a body soap in the shower, I recommend Dove bar soap, unscented for sensitive skin. For a liquid soap, I recommend Cetaphil gentle facial cleanser or Vanicream gentle facial cleanser.     If your skin is itchy, apply the topical steroid triamcinolone 0.1% ointment twice a day on the skin that is itchy and then apply the moisturizer over top. Use this on your body only, do not apply this to your face. Only apply moisturizer on the face.     Right when you get out of the shower and another time throughout the day (even if you are not itchy), apply a moisturizer. Preferably on your entire body, but especially on affected areas. I recommend a cream based moisturizer, like CeraVe or Vanicream.     See the handout below for more gentle skin care suggestions.     Gentle Skin Care  Below is a list of products our providers recommend for gentle skin care.  Moisturizers:    Lighter; Cetaphil Cream, CeraVe, Aveeno and Vanicream Light     Thicker; Aquaphor Ointment, Vaseline, Petrolium Jelly, Eucerin and Vanicream    Avoid Lotions (too thin)  Mild Cleansers:    Dove- Fragrance Free    CeraVe     Vanicream Cleansing Bar    Cetaphil Cleanser     Aquaphor 2 in1 Gentle Wash and Shampoo       Laundry Products:    All Free and Clear    Cheer Free    Generic Brands are okay as long as they are  Fragrance Free      Avoid fabric softeners  and dryer sheets   Sunscreens: SPF 30 or greater     Sunscreens that contain Zinc Oxide or Titanium Dioxide should be applied, these are physical blockers. Spray or  chemical  sunscreens should be avoided.        Shampoo and Conditioners:    Free and Clear by Vanicream    Aquaphor 2 in 1 Gentle Wash and Shampoo Oils:    Mineral Oil     Emu Oil     For some patients, coconut and sunflower seed oil      Generic Products are an okay substitute, but make sure they are fragrance free.  *Avoid  product that have fragrance added to them. Organic does not mean  fragrance free.  In fact patients with sensitive skin can become quite irritated by organic products.     1. Daily bathing is recommended. Make sure you are applying a good moisturizer after bathing every time.  2. Use Moisturizing creams at least twice daily to the whole body.   3. Creams are more moisturizing than lotions  4. Products should be fragrance free- soaps, creams, detergents.   Care Plan:  1. Keep bathing and showering short, less than 15 minutes   2. Always use lukewarm warm when possible. AVOID very HOT or COLD water  3. DO NOT use bubble bath  4. Limit the use of soaps. Focus on the skin folds, face, armpits, groin and feet  5. Do NOT vigorously scrub when you cleanse your skin  6. After bathing, PAT your skin lightly with a towel. DO NOT rub or scrub when drying  7. ALWAYS apply a moisturizer immediately after bathing. This helps to  lock in  the moisture.  8. Reapply moisturizing agents at least twice daily to your whole body  9. Do not use products such as powders, perfumes, or colognes on your skin  10. Avoid saunas and steam baths. This temperature is too HOT  11. Avoid tight or  scratchy  clothing such as wool  12. Always wash new clothing before wearing them for the first time  13. Sometimes a humidifier or vaporizer can be used at night can help the dry skin. Remember to keep it clean to avoid mold growth.

## 2019-12-18 NOTE — PROGRESS NOTES
[facility] Dermatology Note    Dermatology Problem List:  1. Nummular eczema  - Current t/x: TAC 0.1% ointment BID PRN, gentle skin care  - s/p Prednisone 20 mg BID x5 days, TAC 0.5% ointment BID x2 weeks, Hydrocortisone, Hydroxyzine by PCP    Encounter Date: Dec 18, 2019    CC:  Chief Complaint   Patient presents with     Derm Problem     Rash x a few months. Has used prednisone and triamcinolone without relief. Family hx eczema     History of Present Illness:  Mr. Jada Lion is a 29 year old male who presents as a referral from Liliam Hernández MD for a rash. He was recently seen by his PCP who treated his condition with prednisone 20 mg daily, and TAC 0.5% BID,  hydrocortisone and hydroxyzine. During this appointment his PCP noted that the lesions appeared to be bug bites. Today, the patient reports that he has had this rash for the past few months. It began on his face (which is not currently itchy), then migrated to his arms, and is now spreading to his legs (his body is itchy). His mom has been putting disinfectant liquid onto his skin, it might be betadine. He notes that everything he has tried hasn't works. Does not use a moisturizer. Uses scented body wash in the shower. History of eczema in the past, none for many years. Family history of eczema. He notes he is allergic to dogs and is wondering if this is causing the rash. No other concerns addressed today.    Past Medical History:   Patient Active Problem List   Diagnosis     Moderate persistent asthma     Seasonal allergic rhinitis     Plantar fasciitis     Overweight (BMI 25.0-29.9)     CARDIOVASCULAR SCREENING; LDL GOAL LESS THAN 160     Moderate persistent asthma without complication     Hip pain, left     Tobacco use disorder     Rotator cuff injury, right, initial encounter     Cheilitis     Dry eyes     Acute left-sided low back pain without sciatica     No past medical history on file.  Past Surgical History:   Procedure  Laterality Date     DENTAL SURGERY  1-2015    wisdom teeth        Social History:  Patient reports that he has been smoking. He has been smoking about 0.20 packs per day. He has never used smokeless tobacco. He reports current alcohol use. He reports that he does not use drugs. Works as a .     Family History:  History of eczema.    Medications:  Current Outpatient Medications   Medication Sig Dispense Refill     albuterol (PROAIR HFA/PROVENTIL HFA/VENTOLIN HFA) 108 (90 Base) MCG/ACT inhaler Inhale 2 puffs into the lungs every 6 hours 3 Inhaler 3     albuterol (PROVENTIL) (2.5 MG/3ML) 0.083% neb solution INHALE 1 VIAL VIA NEB EVERY 6 HOURS AS NEEDED FOR SHORTNESS OF BREATH/WHEEZING 150 mL 1     fluticasone-salmeterol (ADVAIR) 500-50 MCG/DOSE inhaler Inhale 1 puff into the lungs 2 times daily 1 Inhaler 11     hydrocortisone 2.5 % cream Apply BID to affected region(s) for 7-10 days. 30 g 0     hydrOXYzine (VISTARIL) 25 MG capsule 1-2 tabs every 6 hours prn itch or rash 30 capsule 1     order for DME Equipment being ordered: Nebulizer, tubing and mask 1 Device 0     triamcinolone (KENALOG) 0.5 % external ointment Apply to affected areas 2 times a day for a maximum of 2 weeks 45 g 0       Allergies   Allergen Reactions     Singulair [Montelukast]      Abdominal pain       Review of Systems:  -Constitutional: Patient is otherwise feeling well, in usual state of health.   -Skin: As above in HPI. No additional skin concerns.    Physical exam:  Vitals: There were no vitals taken for this visit.  GEN: This is a well developed, well-nourished male in no acute distress, in a pleasant mood.    SKIN: Total skin excluding the undergarment areas was performed. The exam included the head/face, neck, both arms, chest, back, abdomen, both legs, digits and/or nails.   - Flower Type III  - Nummular dermatitis scattered on the trunk and neck. Some post inflammatory hyperpigmentation around the edges.   - Increased lines  under the eyes  - No other lesions of concern on areas examined.     Impression/Plan:    1. Nummular eczema. Patient has atopic triad. Lesions are not consistent with bugs (ie bed bugs) but rather classic nummular eczema. Discussed the pathogenesis of this condition and empathized the importance of gentle skin care and cream based moisturizer for long term treatment (this has been missing from patient's care plan). Discussed use of topical steroid only when skin is itchy.   - Advised patient to stop putting any disinfectant liquid on his skin as likely causing irritation.  - Gentle skin care handout provided.   - Prescribed TAC 0.1% ointment BID to all itchy, affected areas on the body only. Advised patient to not apply this to the face.    - Provided the patient with the following instructions: In the shower, only apply soap to areas of the body that become sweaty, such as the underarms and groin area. For a body soap in the shower, I recommend Dove bar soap, unscented for sensitive skin. For a liquid soap, I recommend Cetaphil gentle facial cleanser or Vanicream gentle facial cleanser. If your skin is itchy, apply the topical steroid triamcinolone 0.1% ointment twice a day on the skin that is itchy and then apply the moisturizer over top. Use this on your body only, do not apply this to your face. Only apply moisturizer on the face. Right when you get out of the shower and another time throughout the day (even if you are not itchy), apply a moisturizer. Preferably on your entire body, but especially on affected areas. I recommend a cream based moisturizer, like CeraVe or Vanicream.  - Patient will follow-up in 1 month to monitor his progress.     KAYLA Hernández MD on close of this encounter.  Follow-up in 4-6 weeks for eczema, earlier for new or changing lesions.     Staff Involved:  Scribe/Staff    Scribe Disclosure  ROBERT, Tamara Tovar, am serving as a scribe to document services personally performed by   Dot Arce MD, based on data collection and the provider's statements to me.     Provider Disclosure:   The documentation recorded by the scribe accurately reflects the services I personally performed and the decisions made by me.    Dot Arce MD    Department of Dermatology  [facility]: Phone: [phone], Fax:[phone]  [facility]: Phone: [phone], Fax: [phone]

## 2019-12-18 NOTE — LETTER
12/18/2019         RE: Jada Lion  [address]  [place] [address]        Dear Colleague,    Thank you for referring your patient, Jada Lion, to the [facility]. Please see a copy of my visit note below.    [facility] Dermatology Note    Dermatology Problem List:  1. Nummular eczema  - Current t/x: TAC 0.1% ointment BID PRN, gentle skin care  - s/p Prednisone 20 mg BID x5 days, TAC 0.5% ointment BID x2 weeks, Hydrocortisone, Hydroxyzine by PCP    Encounter Date: Dec 18, 2019    CC:  Chief Complaint   Patient presents with     Derm Problem     Rash x a few months. Has used prednisone and triamcinolone without relief. Family hx eczema     History of Present Illness:  Mr. Jada Lion is a 29 year old male who presents as a referral from Liliam Hernández MD for a rash. He was recently seen by his PCP who treated his condition with prednisone 20 mg daily, and TAC 0.5% BID,  hydrocortisone and hydroxyzine. During this appointment his PCP noted that the lesions appeared to be bug bites. Today, the patient reports that he has had this rash for the past few months. It began on his face (which is not currently itchy), then migrated to his arms, and is now spreading to his legs (his body is itchy). His mom has been putting disinfectant liquid onto his skin, it might be betadine. He notes that everything he has tried hasn't works. Does not use a moisturizer. Uses scented body wash in the shower. History of eczema in the past, none for many years. Family history of eczema. He notes he is allergic to dogs and is wondering if this is causing the rash. No other concerns addressed today.    Past Medical History:   Patient Active Problem List   Diagnosis     Moderate persistent asthma     Seasonal allergic rhinitis     Plantar fasciitis     Overweight (BMI 25.0-29.9)     CARDIOVASCULAR SCREENING; LDL GOAL LESS THAN 160     Moderate persistent asthma without complication      Hip pain, left     Tobacco use disorder     Rotator cuff injury, right, initial encounter     Cheilitis     Dry eyes     Acute left-sided low back pain without sciatica     No past medical history on file.  Past Surgical History:   Procedure Laterality Date     DENTAL SURGERY  1-2015    wisdom teeth        Social History:  Patient reports that he has been smoking. He has been smoking about 0.20 packs per day. He has never used smokeless tobacco. He reports current alcohol use. He reports that he does not use drugs. Works as a .     Family History:  History of eczema.    Medications:  Current Outpatient Medications   Medication Sig Dispense Refill     albuterol (PROAIR HFA/PROVENTIL HFA/VENTOLIN HFA) 108 (90 Base) MCG/ACT inhaler Inhale 2 puffs into the lungs every 6 hours 3 Inhaler 3     albuterol (PROVENTIL) (2.5 MG/3ML) 0.083% neb solution INHALE 1 VIAL VIA NEB EVERY 6 HOURS AS NEEDED FOR SHORTNESS OF BREATH/WHEEZING 150 mL 1     fluticasone-salmeterol (ADVAIR) 500-50 MCG/DOSE inhaler Inhale 1 puff into the lungs 2 times daily 1 Inhaler 11     hydrocortisone 2.5 % cream Apply BID to affected region(s) for 7-10 days. 30 g 0     hydrOXYzine (VISTARIL) 25 MG capsule 1-2 tabs every 6 hours prn itch or rash 30 capsule 1     order for DME Equipment being ordered: Nebulizer, tubing and mask 1 Device 0     triamcinolone (KENALOG) 0.5 % external ointment Apply to affected areas 2 times a day for a maximum of 2 weeks 45 g 0       Allergies   Allergen Reactions     Singulair [Montelukast]      Abdominal pain       Review of Systems:  -Constitutional: Patient is otherwise feeling well, in usual state of health.   -Skin: As above in HPI. No additional skin concerns.    Physical exam:  Vitals: There were no vitals taken for this visit.  GEN: This is a well developed, well-nourished male in no acute distress, in a pleasant mood.    SKIN: Total skin excluding the undergarment areas was performed. The exam included  the head/face, neck, both arms, chest, back, abdomen, both legs, digits and/or nails.   - Flower Type III  - Nummular dermatitis scattered on the trunk and neck. Some post inflammatory hyperpigmentation around the edges.   - Increased lines under the eyes  - No other lesions of concern on areas examined.     Impression/Plan:    1. Nummular eczema. Patient has atopic triad. Lesions are not consistent with bugs (ie bed bugs) but rather classic nummular eczema. Discussed the pathogenesis of this condition and empathized the importance of gentle skin care and cream based moisturizer for long term treatment (this has been missing from patient's care plan). Discussed use of topical steroid only when skin is itchy.   - Advised patient to stop putting any disinfectant liquid on his skin as likely causing irritation.  - Gentle skin care handout provided.   - Prescribed TAC 0.1% ointment BID to all itchy, affected areas on the body only. Advised patient to not apply this to the face.    - Provided the patient with the following instructions: In the shower, only apply soap to areas of the body that become sweaty, such as the underarms and groin area. For a body soap in the shower, I recommend Dove bar soap, unscented for sensitive skin. For a liquid soap, I recommend Cetaphil gentle facial cleanser or Vanicream gentle facial cleanser. If your skin is itchy, apply the topical steroid triamcinolone 0.1% ointment twice a day on the skin that is itchy and then apply the moisturizer over top. Use this on your body only, do not apply this to your face. Only apply moisturizer on the face. Right when you get out of the shower and another time throughout the day (even if you are not itchy), apply a moisturizer. Preferably on your entire body, but especially on affected areas. I recommend a cream based moisturizer, like CeraVe or Vanicream.  - Patient will follow-up in 1 month to monitor his progress.     KAYLA Hernández MD on  close of this encounter.  Follow-up in 4-6 weeks for eczema, earlier for new or changing lesions.     Staff Involved:  Scribe/Staff    Scribe Disclosure  I, Tamara Guy, am serving as a scribe to document services personally performed by Dr. Dot Arce MD, based on data collection and the provider's statements to me.     Provider Disclosure:   The documentation recorded by the scribe accurately reflects the services I personally performed and the decisions made by me.    Dot Arce MD    Department of Dermatology  [facility]: Phone: [phone], Fax:[phone]  [facility]: Phone: [phone], Fax: [phone]                Again, thank you for allowing me to participate in the care of your patient.        Sincerely,        Dot Arce MD

## 2020-03-31 DIAGNOSIS — J45.40 MODERATE PERSISTENT ASTHMA WITHOUT COMPLICATION: ICD-10-CM

## 2020-03-31 RX ORDER — ALBUTEROL SULFATE 90 UG/1
AEROSOL, METERED RESPIRATORY (INHALATION)
Qty: 17 INHALER | Refills: 0 | OUTPATIENT
Start: 2020-03-31

## 2020-03-31 NOTE — TELEPHONE ENCOUNTER
"Requested Prescriptions   Pending Prescriptions Disp Refills     albuterol (PROAIR HFA/PROVENTIL HFA/VENTOLIN HFA) 108 (90 Base) MCG/ACT inhaler [Pharmacy Med Name: ALBUTEROL HFA (PROAIR) INHALER]  Last Written Prescription Date:  12/11/19  Last Fill Quantity: 3,  # refills: 3   Last Office Visit with [facility], [facility] or [facility] prescribing provider:  12/11/19Nissa   Future Office Visit:    17 Inhaler 0     Sig: INHALE 2 PUUFS INTO THE LUNGS EVERY 6 HOURS AS NEEDED       Asthma Maintenance Inhalers - Anticholinergics Passed - 3/31/2020  1:47 AM        Passed - Patient is age 12 years or older        Passed - Asthma control assessment score within normal limits in last 6 months     Please review ACT score.           Passed - Medication is active on med list        Passed - Recent (6 mo) or future (30 days) visit within the authorizing provider's specialty     Patient had office visit in the last 6 months or has a visit in the next 30 days with authorizing provider or within the authorizing provider's specialty.  See \"Patient Info\" tab in inbasket, or \"Choose Columns\" in Meds & Orders section of the refill encounter.           Short-Acting Beta Agonist Inhalers Protocol  Passed - 3/31/2020  1:47 AM        Passed - Patient is age 12 or older        Passed - Asthma control assessment score within normal limits in last 6 months     Please review ACT score.           Passed - Medication is active on med list        Passed - Recent (6 mo) or future (30 days) visit within the authorizing provider's specialty     Patient had office visit in the last 6 months or has a visit in the next 30 days with authorizing provider or within the authorizing provider's specialty.  See \"Patient Info\" tab in inbasket, or \"Choose Columns\" in Meds & Orders section of the refill encounter.                 "

## 2020-04-10 ENCOUNTER — TELEPHONE (OUTPATIENT)
Dept: FAMILY MEDICINE | Facility: CLINIC | Age: 30
End: 2020-04-10

## 2020-04-10 DIAGNOSIS — J45.40 MODERATE PERSISTENT ASTHMA WITHOUT COMPLICATION: Primary | ICD-10-CM

## 2020-04-10 RX ORDER — ALBUTEROL SULFATE 90 UG/1
2 AEROSOL, METERED RESPIRATORY (INHALATION) EVERY 4 HOURS PRN
Qty: 1 INHALER | Refills: 11 | Status: SHIPPED | OUTPATIENT
Start: 2020-04-10 | End: 2022-11-16

## 2020-04-10 NOTE — TELEPHONE ENCOUNTER
Explain to patient: Albuterol Rx hasn't been changed since it was written in December.  If the form changed (Proair HFA, Proair RespiClick, Proventil, Ventolin, generic), that would be due to his insurance. He can discuss with his pharmacy which forms of albuterol are covered by his insurance.

## 2020-04-10 NOTE — TELEPHONE ENCOUNTER
Regular    Reason for Call:  Other prescription    Detailed comments: Pt states that he normally uses the regular pro air inhaler but the provider sent over a different one. He is wondering if a regular one can get sent to his pharmacy. Thank you.    Phone Number Patient can be reached at: Home number on file [phone] (home)    Best Time: Any    Can we leave a detailed message on this number? NO    Call taken on 4/10/2020 at 1:11 PM by Yadira Conroy

## 2020-04-10 NOTE — TELEPHONE ENCOUNTER
Patient now has new insurance he is with Medicaid, please change this on his file I am unable to change this on patient file. Patient states that the Respiclick Inhaler does not work for him and that he will need new prescription. Called pharmacy, he is now switched over to the [facility] in [place] [phone]. The plain ProAir Albuterol has a $3.00 co pay per pharmacy which is okay with the patient.    Janet Thomason MA on 4/10/2020 at 2:59 PM

## 2020-04-21 ENCOUNTER — VIRTUAL VISIT (OUTPATIENT)
Dept: FAMILY MEDICINE | Facility: CLINIC | Age: 30
End: 2020-04-21

## 2020-04-21 DIAGNOSIS — J30.2 SEASONAL ALLERGIES: Primary | ICD-10-CM

## 2020-04-21 PROCEDURE — 99213 OFFICE O/P EST LOW 20 MIN: CPT | Mod: 95 | Performed by: FAMILY MEDICINE

## 2020-04-21 RX ORDER — OLOPATADINE HYDROCHLORIDE 1 MG/ML
1 SOLUTION/ DROPS OPHTHALMIC 2 TIMES DAILY
Qty: 5 ML | Refills: 11 | Status: SHIPPED | OUTPATIENT
Start: 2020-04-21 | End: 2023-07-21

## 2020-04-21 RX ORDER — FLUTICASONE PROPIONATE 50 MCG
1 SPRAY, SUSPENSION (ML) NASAL DAILY
Qty: 16 G | Refills: 11 | Status: SHIPPED | OUTPATIENT
Start: 2020-04-21 | End: 2022-08-24

## 2020-04-21 RX ORDER — LEVOCETIRIZINE DIHYDROCHLORIDE 5 MG/1
5 TABLET, FILM COATED ORAL EVERY EVENING
Qty: 90 TABLET | Refills: 3 | Status: SHIPPED | OUTPATIENT
Start: 2020-04-21 | End: 2020-04-22

## 2020-04-21 NOTE — PROGRESS NOTES
"Jada Lion is a 29 year old male who is being evaluated via a billable telephone visit.      The patient has been notified of following:     \"This telephone visit will be conducted via a call between you and your physician/provider. We have found that certain health care needs can be provided without the need for a physical exam.  This service lets us provide the care you need with a short phone conversation.  If a prescription is necessary we can send it directly to your pharmacy.  If lab work is needed we can place an order for that and you can then stop by our lab to have the test done at a later time.    Telephone visits are billed at different rates depending on your insurance coverage. During this emergency period, for some insurers they may be billed the same as an in-person visit.  Please reach out to your insurance provider with any questions.    If during the course of the call the physician/provider feels a telephone visit is not appropriate, you will not be charged for this service.\"    Patient has given verbal consent for Telephone visit?  Yes    How would you like to obtain your AVS? Mail a copy    Subjective     Jada Lion is a 29 year old male who presents to clinic today for the following health issues:    HPI    Patient c/o last few weeks having symptoms of nasal congestion, itchy eyes, facial redness and itchiness. No sob. No wheezing.     Patient Active Problem List   Diagnosis     Moderate persistent asthma     Seasonal allergic rhinitis     Plantar fasciitis     Overweight (BMI 25.0-29.9)     CARDIOVASCULAR SCREENING; LDL GOAL LESS THAN 160     Moderate persistent asthma without complication     Hip pain, left     Tobacco use disorder     Rotator cuff injury, right, initial encounter     Cheilitis     Dry eyes     Acute left-sided low back pain without sciatica     Past Surgical History:   Procedure Laterality Date     DENTAL SURGERY  1-2015    wisdom teeth        Social History "     Tobacco Use     Smoking status: Current Every Day Smoker     Packs/day: 0.20     Smokeless tobacco: Never Used   Substance Use Topics     Alcohol use: Yes     Family History   Problem Relation Age of Onset     Diabetes Mother      Hypertension Mother      Asthma Mother      Other Cancer Maternal Grandfather      Other Cancer Paternal Grandfather         unsure if heart disease      Coronary Artery Disease No family hx of          Current Outpatient Medications   Medication Sig Dispense Refill     albuterol (PROAIR HFA/PROVENTIL HFA/VENTOLIN HFA) 108 (90 Base) MCG/ACT inhaler Inhale 2 puffs into the lungs every 4 hours as needed for shortness of breath / dyspnea or wheezing 1 Inhaler 11     albuterol (PROAIR HFA/PROVENTIL HFA/VENTOLIN HFA) 108 (90 Base) MCG/ACT inhaler Inhale 2 puffs into the lungs every 6 hours 3 Inhaler 3     albuterol (PROVENTIL) (2.5 MG/3ML) 0.083% neb solution INHALE 1 VIAL VIA NEB EVERY 6 HOURS AS NEEDED FOR SHORTNESS OF BREATH/WHEEZING 150 mL 1     fluticasone (FLONASE) 50 MCG/ACT nasal spray Spray 1 spray into both nostrils daily For allergies and nasal congestion. 16 g 11     fluticasone-salmeterol (ADVAIR) 500-50 MCG/DOSE inhaler Inhale 1 puff into the lungs 2 times daily 1 Inhaler 11     hydrocortisone 2.5 % cream Apply BID to affected region(s) for 7-10 days. 30 g 0     hydrOXYzine (VISTARIL) 25 MG capsule 1-2 tabs every 6 hours prn itch or rash 30 capsule 1     levocetirizine (XYZAL) 5 MG tablet Take 1 tablet (5 mg) by mouth every evening For allergies. 90 tablet 3     olopatadine (PATANOL) 0.1 % ophthalmic solution Place 1 drop into both eyes 2 times daily For itchy eyes due to allergies. 5 mL 11     order for DME Equipment being ordered: Nebulizer, tubing and mask 1 Device 0     triamcinolone (KENALOG) 0.1 % external ointment Apply thin layer twice daily to affected areas. 453.6 g 2     triamcinolone (KENALOG) 0.5 % external ointment Apply to affected areas 2 times a day for a  maximum of 2 weeks 45 g 0     Allergies   Allergen Reactions     Singulair [Montelukast]      Abdominal pain     BP Readings from Last 3 Encounters:   12/11/19 117/76   11/12/19 105/69   10/07/19 131/78    Wt Readings from Last 3 Encounters:   12/11/19 84.6 kg (186 lb 9.6 oz)   11/12/19 83.5 kg (184 lb)   10/07/19 84.4 kg (186 lb)                    Reviewed and updated as needed this visit by Provider         Review of Systems   ROS COMP: Constitutional, HEENT, cardiovascular, pulmonary, GI, , musculoskeletal, neuro, skin, endocrine and psych systems are negative, except as otherwise noted.       Objective   Reported vitals:  There were no vitals taken for this visit.   healthy, alert and no distress  PSYCH: Alert and oriented times 3; coherent speech, normal   rate and volume, able to articulate logical thoughts, able   to abstract reason, no tangential thoughts, no hallucinations   or delusions  His affect is normal  RESP: No cough, no audible wheezing, able to talk in full sentences  Remainder of exam unable to be completed due to telephone visits    Diagnostic Test Results:  Labs reviewed in Epic        Assessment/Plan:  1. Seasonal allergies  Treat with an antihistamine, Patanol eye drops and nasal topical steroid. Patient will let us know if symptoms do not improve.  - levocetirizine (XYZAL) 5 MG tablet; Take 1 tablet (5 mg) by mouth every evening For allergies.  Dispense: 90 tablet; Refill: 3  - fluticasone (FLONASE) 50 MCG/ACT nasal spray; Spray 1 spray into both nostrils daily For allergies and nasal congestion.  Dispense: 16 g; Refill: 11  - olopatadine (PATANOL) 0.1 % ophthalmic solution; Place 1 drop into both eyes 2 times daily For itchy eyes due to allergies.  Dispense: 5 mL; Refill: 11    Return in about 6 months (around 10/21/2020) for Physical Exam.      Phone call duration:  11 minutes    Destin Fischer MD, MD

## 2020-04-22 ENCOUNTER — TELEPHONE (OUTPATIENT)
Dept: FAMILY MEDICINE | Facility: CLINIC | Age: 30
End: 2020-04-22

## 2020-04-22 DIAGNOSIS — J30.2 SEASONAL ALLERGIES: ICD-10-CM

## 2020-04-22 RX ORDER — CETIRIZINE HYDROCHLORIDE 10 MG/1
10 TABLET ORAL DAILY
Qty: 90 TABLET | Refills: 3 | Status: SHIPPED | OUTPATIENT
Start: 2020-04-22 | End: 2023-07-21

## 2020-04-22 NOTE — TELEPHONE ENCOUNTER
Plan does not cover levocetirizine (XYZAL) 5 MG tablet.  Please call [phone] to initiate Prior Auth or change med.    Insurance type and ID number: ID# [id number]      Additional Information:     Kiara Ro

## 2020-07-12 DIAGNOSIS — J45.40 MODERATE PERSISTENT ASTHMA WITHOUT COMPLICATION: ICD-10-CM

## 2020-07-15 RX ORDER — ALBUTEROL SULFATE 0.83 MG/ML
SOLUTION RESPIRATORY (INHALATION)
Qty: 225 ML | Refills: 0 | Status: SHIPPED | OUTPATIENT
Start: 2020-07-15 | End: 2020-08-21

## 2020-07-15 NOTE — TELEPHONE ENCOUNTER
Routing refill request to provider for review/approval because:  ACT fails.    Radha Crowder RN, [facility] Triage

## 2020-08-19 DIAGNOSIS — J45.40 MODERATE PERSISTENT ASTHMA WITHOUT COMPLICATION: ICD-10-CM

## 2020-08-21 RX ORDER — ALBUTEROL SULFATE 0.83 MG/ML
SOLUTION RESPIRATORY (INHALATION)
Qty: 225 ML | Refills: 0 | Status: SHIPPED | OUTPATIENT
Start: 2020-08-21 | End: 2022-08-24

## 2021-12-15 ENCOUNTER — APPOINTMENT (OUTPATIENT)
Dept: RADIOLOGY | Facility: CLINIC | Age: 31
End: 2021-12-15
Attending: EMERGENCY MEDICINE
Payer: COMMERCIAL

## 2021-12-15 ENCOUNTER — HOSPITAL ENCOUNTER (EMERGENCY)
Facility: CLINIC | Age: 31
Discharge: HOME | End: 2021-12-15
Attending: EMERGENCY MEDICINE | Admitting: EMERGENCY MEDICINE
Payer: COMMERCIAL

## 2021-12-15 VITALS
WEIGHT: 200 LBS | OXYGEN SATURATION: 96 % | SYSTOLIC BLOOD PRESSURE: 109 MMHG | DIASTOLIC BLOOD PRESSURE: 62 MMHG | HEART RATE: 78 BPM | TEMPERATURE: 99.1 F | RESPIRATION RATE: 18 BRPM | BODY MASS INDEX: 32.28 KG/M2

## 2021-12-15 DIAGNOSIS — S49.92XA SHOULDER INJURY, LEFT, INITIAL ENCOUNTER: ICD-10-CM

## 2021-12-15 DIAGNOSIS — S49.92XA INJURY OF LEFT ACROMIOCLAVICULAR JOINT, INITIAL ENCOUNTER: ICD-10-CM
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
## 2021-12-15 PROCEDURE — 99283 EMERGENCY DEPT VISIT LOW MDM: CPT

## 2021-12-15 PROCEDURE — 73030 X-RAY EXAM OF SHOULDER: CPT | Mod: LT

## 2021-12-15 ASSESSMENT — ENCOUNTER SYMPTOMS
WEAKNESS: 0
FEVER: 0
WOUND: 0
BACK PAIN: 0
NECK PAIN: 0
COUGH: 0
NUMBNESS: 0
SHORTNESS OF BREATH: 0
ABDOMINAL PAIN: 0

## 2021-12-15 NOTE — Clinical Note
Jada Lion was seen and treated in our emergency department on 12/15/2021.  He may return to work on 12/16/2021.  Jada can return to work with modified work.  He has an injury to the left shoulder and should not use the shoulder at all for any activities.  He is able to do activities that would be limited to his right arm and not put him at risk of injuring his left arm.  He will follow-up with orthopedics and modified an updated work restriction will be provided by them.     If you have any questions or concerns, please don't hesitate to call.      Deya Thrasher MD

## 2021-12-16 NOTE — ED PROVIDER NOTES
EMERGENCY DEPARTMENT ENCOUNTER      NAME: Jada Lion  AGE: 31 year old male  YOB: 1990  MRN: [id number]  EVALUATION DATE & TIME: 12/15/2021  9:06 PM    PCP: Destin Fischer    ED PROVIDER: Deya Thrasher M.D.        Chief Complaint   Patient presents with     Shoulder Pain     Trauma         FINAL IMPRESSION:    1. Shoulder injury, left, initial encounter    2. Injury while playing volleyball    3. Injury of left acromioclavicular joint, initial encounter            MEDICAL DECISION MAKIN year old male with history of asthma who presents emergency department with left shoulder pain while playing volleyball.  He went to dive for the volleyball and his friend kneed him in the shoulder area.  X-ray does not show any shoulder dislocation, shoulder fracture, clavicle fracture, or even AC joint separation.  The clinically seems more like an AC separation type picture.  Plan at this time is to sling him for support and comfort and follow-up with orthopedics as an outpatient.  Patient agrees with this plan and all of his questions have been answered      ED COURSE:  9:12 PM I met with the patient to gather history and perform my exam. ED course and treatment discussed. Patient hopes to drive himself home and would prefer no pain medications. Patient deferred medications including Tylenol and Ibuprofen.    10:19 PM   I updated patient on results. We discussed the plan for discharge and the patient is agreeable. Reviewed supportive cares, symptomatic treatment, outpatient follow up, and reasons to return to the Emergency Department. Patient to be discharged by ED RN.  X-ray does not show any signs for fracture to the clavicle, shoulder, or signs of AC separation.  Clinically though this sounds like an AC separation type injury with the pop that he is having.  He is able to lift the shoulder to about 45 degrees and then he has increasing pain.  This happens both active and passive range of motion.   Neurovascular intact distally.  Plan at this time is to sling him for comfort and support and have him follow-up with orthopedics.  Contact information provided to [facility].  He does not appear toxic or septic.  No indication for emergent orthopedic consultation or MRI imaging.  Do not feel that he needs emergent CT imaging at this time as there is no obvious bony deformity seen on x-ray imaging.    I do not think that this represents rib fractures, PE, ruptured AAA, pneumothorax, aortic dissection, ACS. At this time I feel that this patient can be discharged from the emergency department and can followup as directed.    COVID-19 PPE worn during patient evaluation:  Mask: n95 and homemade masks  Eye Protection: goggles   Gown: none  Hair cover: yes  Face shield: yes   Patient wearing a mask: yes    At the conclusion of the encounter I discussed the results of all of the tests and the disposition. Their questions were answered. The patient (and any family present) acknowledged understanding and were agreeable with the care plan.        CONSULTANTS:  Pt to f/u with ortho in clinic (pt to schedule appt)          MEDICATIONS GIVEN IN THE EMERGENCY:  Medications - No data to display          NEW PRESCRIPTIONS STARTED AT TODAY'S ER VISIT     Medication List      There are no discharge medications for this visit.             CONDITION:  stable        DISPOSITION:  discharge home       =================================================================  =================================================================    HPI    Patient information was obtained from: Patient    Use of Intrepreter: N/A      Jada Lion is a 31 year old male with history of asthma and tobacco use disorder who presents to the ER with complaints of shoulder pain     Patient reports left shoulder/clavicle pain while playing volleyball today. He states that he dove for the ball when his friend came towards him. He reports his friend's  knee hit his shoulder area and afterwards felt a pop and pain. Denies any other injuries, numbness or tingling to hands, chest pain, shortness of breath, fever, cough, neck pain, and back pain. Patient reports history of asthma. No other complaints at this time.    REVIEW OF SYSTEMS  Review of Systems   Constitutional: Negative for fever.   Respiratory: Negative for cough and shortness of breath.    Cardiovascular: Negative for chest pain.   Gastrointestinal: Negative for abdominal pain.   Musculoskeletal: Negative for back pain and neck pain.        +left shoulder/clavicle pain   Skin: Negative for wound.   Allergic/Immunologic: Negative for immunocompromised state.   Neurological: Negative for weakness and numbness.   All other systems reviewed and are negative.          PAST MEDICAL HISTORY:  Past Medical History:   Diagnosis Date     Obesity      Uncomplicated asthma          PAST SURGICAL HISTORY:  Past Surgical History:   Procedure Laterality Date     DENTAL SURGERY  1-2015    wisdom teeth          CURRENT MEDICATIONS:    Prior to Admission medications    Medication Sig Start Date End Date Taking? Authorizing Provider   albuterol (PROAIR HFA/PROVENTIL HFA/VENTOLIN HFA) 108 (90 Base) MCG/ACT inhaler Inhale 2 puffs into the lungs every 4 hours as needed for shortness of breath / dyspnea or wheezing 4/10/20   Carlota Centeno MD   albuterol (PROAIR HFA/PROVENTIL HFA/VENTOLIN HFA) 108 (90 Base) MCG/ACT inhaler Inhale 2 puffs into the lungs every 6 hours 12/11/19   Liliam Hernández MD   albuterol (PROVENTIL) (2.5 MG/3ML) 0.083% neb solution INHALE 1 VIAL VIA NEBULIZATION EVERY 6 HOURS AS NEEDED FOR SHORTNESS OF BREATH OR WHEEZING. 8/21/20   Destin Fischer MD   cetirizine (ZYRTEC) 10 MG tablet Take 1 tablet (10 mg) by mouth daily 4/22/20   Destin Fischer MD   fluticasone (FLONASE) 50 MCG/ACT nasal spray Spray 1 spray into both nostrils daily For allergies and nasal congestion. 4/21/20   Destin Fischer MD    fluticasone-salmeterol (ADVAIR) 500-50 MCG/DOSE inhaler Inhale 1 puff into the lungs 2 times daily 11/12/19   Destin Fischer MD   hydrocortisone 2.5 % cream Apply BID to affected region(s) for 7-10 days. 10/7/19   Nate Feng PA   hydrOXYzine (VISTARIL) 25 MG capsule 1-2 tabs every 6 hours prn itch or rash 10/7/19   Nate Feng PA   olopatadine (PATANOL) 0.1 % ophthalmic solution Place 1 drop into both eyes 2 times daily For itchy eyes due to allergies. 4/21/20   Destin Fischer MD   order for DME Equipment being ordered: Nebulizer, tubing and mask 12/11/19   Liliam Hernández MD   triamcinolone (KENALOG) 0.1 % external ointment Apply thin layer twice daily to affected areas. 12/18/19   Dot Arce MD   triamcinolone (KENALOG) 0.5 % external ointment Apply to affected areas 2 times a day for a maximum of 2 weeks 12/11/19   Liliam Hernández MD         ALLERGIES:  Allergies   Allergen Reactions     Singulair [Montelukast]      Abdominal pain         FAMILY HISTORY:  Family History   Problem Relation Age of Onset     Diabetes Mother      Hypertension Mother      Asthma Mother      Other Cancer Maternal Grandfather      Other Cancer Paternal Grandfather         unsure if heart disease      Coronary Artery Disease No family hx of          SOCIAL HISTORY:  Social History     Socioeconomic History     Marital status: Single     Spouse name: None     Number of children: None     Years of education: None     Highest education level: None   Occupational History     None   Tobacco Use     Smoking status: Current Every Day Smoker     Packs/day: 0.20     Smokeless tobacco: Never Used   Substance and Sexual Activity     Alcohol use: Yes     Drug use: No     Sexual activity: Yes     Partners: Female     Birth control/protection: None   Other Topics Concern     Parent/sibling w/ CABG, MI or angioplasty before 65F 55M? Not Asked   Social History Narrative     None     Social Determinants of Health      Financial Resource Strain: Not on file   Food Insecurity: Not on file   Transportation Needs: Not on file   Physical Activity: Not on file   Stress: Not on file   Social Connections: Not on file   Intimate Partner Violence: Not on file   Housing Stability: Not on file         VITALS:  Patient Vitals for the past 24 hrs:   BP Temp Temp src Pulse Resp SpO2 Weight   12/15/21 2215 109/62 -- -- 78 -- 96 % --   12/15/21 2200 122/59 -- -- 76 -- 98 % --   12/15/21 2104 (!) 150/92 99.1  F (37.3  C) Oral 97 18 99 % 90.7 kg (200 lb)       Wt Readings from Last 3 Encounters:   12/15/21 90.7 kg (200 lb)   12/11/19 84.6 kg (186 lb 9.6 oz)   11/12/19 83.5 kg (184 lb)         PHYSICAL EXAM    Constitutional:  Well developed, Well nourished, NAD, GCS 15  HENT:  Normocephalic, Atraumatic, Bilateral external ears normal, Nose normal. Neck- Normal range of motion, No tenderness, Supple, No stridor.   Eyes:  PERRL, EOMI, Conjunctiva normal, No discharge.  Respiratory:  Normal breath sounds, No respiratory distress, No wheezing, Speaks full sentences easily. No cough.   Cardiovascular:  Normal heart rate, Regular rhythm, No murmurs, No rubs, No gallops. Chest wall nontender.   GI:  + obesity.  Bowel sounds normal, Soft, No tenderness, No masses, No flank tenderness. No rebound or guarding.   : deferred  Musculoskeletal: 2+ radial pulses.  No cyanosis, No clubbing.  No deformities noted. No tenderness of the CTLS spine. +focal tenderness to lateral clavicle.AC joint area. Shoulder seems to be nontender and humeral head seems to be in joint. Neurovasc intact distally.  Integument:  Warm, Dry, No erythema, No rash.  No petechiae.   Neurologic:  Alert & oriented x 3, No focal deficits noted. Normal gait.   Psychiatric:  Affect normal, Cooperative          LAB:  All pertinent labs reviewed and interpreted.  No results found for this or any previous visit (from the past 24 hour(s)).    No results found for:  ABORH        RADIOLOGY:  Reviewed all pertinent imaging. Please see official radiology report.    XR Shoulder Left 2 Views   Final Result   IMPRESSION: Normal glenohumeral and acromioclavicular joint alignment. Normal joint spacing. No acute or chronic fracture deformity.             EKG:    none      PROCEDURES:  none      I, Ariana Basurto, am serving as a scribe to document services personally performed by Dr. Deya Thrasher based on my observation and the provider's statements to me. I, Dr. Deya Thrasher MD attest that Ariana Basurto is acting in a scribe capacity, has observed my performance of the services and has documented them in accordance with my direction.        Deya Thrasher M.D. [facility]  Emergency Medicine and Medical Toxicology  [facility] EMERGENCY ROOM  [address] [id number] [phone]  Dept: [phone]           Deya Thrasher MD  12/15/21 2349

## 2021-12-16 NOTE — ED TRIAGE NOTES
Patient presents to triage with c/o L shoulder pain after colliding with another player while playing vollyball. Deformity of L shoulder noted in triage. CMS intact.

## 2021-12-16 NOTE — DISCHARGE INSTRUCTIONS
No obvious fracture seen on the x-ray.  This may be an acromioclavicular injury (AC joint injury).  Wear the sling except when sleeping.  Call tomorrow make an appointment to follow-up with orthopedics.  We use [facility], but you are welcome to see any orthopedic group you wish.  [facility] does now advertised online appointment scheduling.    I recommend icing the area 3 times a day for 20 minutes.  You can also take Tylenol 500 mg every 6 hours and ibuprofen 400 mg every 6 hours if needed for pain.    Return to emergency department with worse pain, cold or numb fingers, or any other concerns.    Thank you for choosing [facility] Emergency Department.  It has been my pleasure caring for you today.     ~Dr. Price MD

## 2022-08-24 ENCOUNTER — OFFICE VISIT (OUTPATIENT)
Dept: INTERNAL MEDICINE | Facility: CLINIC | Age: 32
End: 2022-08-24
Payer: COMMERCIAL

## 2022-08-24 VITALS
OXYGEN SATURATION: 99 % | RESPIRATION RATE: 20 BRPM | DIASTOLIC BLOOD PRESSURE: 80 MMHG | TEMPERATURE: 98.1 F | HEART RATE: 75 BPM | SYSTOLIC BLOOD PRESSURE: 114 MMHG | WEIGHT: 196.8 LBS | BODY MASS INDEX: 31.76 KG/M2

## 2022-08-24 DIAGNOSIS — J02.9 SORE THROAT: Primary | ICD-10-CM

## 2022-08-24 DIAGNOSIS — J45.40 MODERATE PERSISTENT ASTHMA WITHOUT COMPLICATION: ICD-10-CM

## 2022-08-24 DIAGNOSIS — J45.41 MODERATE PERSISTENT ASTHMA WITH ACUTE EXACERBATION: ICD-10-CM

## 2022-08-24 LAB
DEPRECATED S PYO AG THROAT QL EIA: NEGATIVE
GROUP A STREP BY PCR: NOT DETECTED

## 2022-08-24 PROCEDURE — 87651 STREP A DNA AMP PROBE: CPT | Performed by: NURSE PRACTITIONER

## 2022-08-24 PROCEDURE — 99213 OFFICE O/P EST LOW 20 MIN: CPT | Performed by: NURSE PRACTITIONER

## 2022-08-24 RX ORDER — FLUTICASONE PROPIONATE AND SALMETEROL 500; 50 UG/1; UG/1
1 POWDER RESPIRATORY (INHALATION) 2 TIMES DAILY
Qty: 1 EACH | Refills: 11 | Status: SHIPPED | OUTPATIENT
Start: 2022-08-24 | End: 2022-11-16

## 2022-08-24 RX ORDER — DEXAMETHASONE SODIUM PHOSPHATE 4 MG/ML
10 VIAL (ML) INJECTION ONCE
Status: COMPLETED | OUTPATIENT
Start: 2022-08-24 | End: 2022-08-24

## 2022-08-24 RX ORDER — DEXAMETHASONE 6 MG/1
6 TABLET ORAL DAILY
Qty: 3 TABLET | Refills: 0 | Status: SHIPPED | OUTPATIENT
Start: 2022-08-24 | End: 2023-07-21

## 2022-08-24 RX ORDER — ALBUTEROL SULFATE 0.83 MG/ML
SOLUTION RESPIRATORY (INHALATION)
Qty: 225 ML | Refills: 0 | Status: SHIPPED | OUTPATIENT
Start: 2022-08-24 | End: 2023-06-19

## 2022-08-24 RX ORDER — ALBUTEROL SULFATE 90 UG/1
2 AEROSOL, METERED RESPIRATORY (INHALATION) EVERY 6 HOURS
Qty: 18 G | Refills: 1 | Status: SHIPPED | OUTPATIENT
Start: 2022-08-24 | End: 2022-11-16

## 2022-08-24 RX ADMIN — Medication 10 MG: at 13:58

## 2022-08-24 ASSESSMENT — ASTHMA QUESTIONNAIRES
QUESTION_4 LAST FOUR WEEKS HOW OFTEN HAVE YOU USED YOUR RESCUE INHALER OR NEBULIZER MEDICATION (SUCH AS ALBUTEROL): TWO OR THREE TIMES PER WEEK
QUESTION_1 LAST FOUR WEEKS HOW MUCH OF THE TIME DID YOUR ASTHMA KEEP YOU FROM GETTING AS MUCH DONE AT WORK, SCHOOL OR AT HOME: MOST OF THE TIME
QUESTION_2 LAST FOUR WEEKS HOW OFTEN HAVE YOU HAD SHORTNESS OF BREATH: MORE THAN ONCE A DAY
ACT_TOTALSCORE: 14
ACT_TOTALSCORE: 14
QUESTION_5 LAST FOUR WEEKS HOW WOULD YOU RATE YOUR ASTHMA CONTROL: SOMEWHAT CONTROLLED
QUESTION_3 LAST FOUR WEEKS HOW OFTEN DID YOUR ASTHMA SYMPTOMS (WHEEZING, COUGHING, SHORTNESS OF BREATH, CHEST TIGHTNESS OR PAIN) WAKE YOU UP AT NIGHT OR EARLIER THAN USUAL IN THE MORNING: NOT AT ALL

## 2022-08-24 ASSESSMENT — PAIN SCALES - GENERAL: PAINLEVEL: NO PAIN (0)

## 2022-08-24 NOTE — LETTER
[address]  [facility] [address]  [phone]          August 24, 2022    RE:  Jada Lion                                                                                                                                                       [address]  [place] [address]            To whom it may concern:    Please excuse Jada Lion for Tuesday August 23rd and Wednesday August 24th.       Sincerely,        Anne Contreras NP [facility]

## 2022-08-24 NOTE — PROGRESS NOTES
Assessment & Plan   Problem List Items Addressed This Visit        Respiratory    Moderate persistent asthma    Relevant Medications    fluticasone-salmeterol (ADVAIR DISKUS) 500-50 MCG/ACT inhaler    albuterol (PROAIR HFA/PROVENTIL HFA/VENTOLIN HFA) 108 (90 Base) MCG/ACT inhaler    albuterol (PROVENTIL) (2.5 MG/3ML) 0.083% neb solution    Moderate persistent asthma without complication    Relevant Medications    fluticasone-salmeterol (ADVAIR DISKUS) 500-50 MCG/ACT inhaler    albuterol (PROAIR HFA/PROVENTIL HFA/VENTOLIN HFA) 108 (90 Base) MCG/ACT inhaler    albuterol (PROVENTIL) (2.5 MG/3ML) 0.083% neb solution      Other Visit Diagnoses     Sore throat    -  Primary    Relevant Medications    dexamethasone (DECADRON) injectable solution used ORALLY 10 mg (Completed)    dexamethasone (DECADRON) 6 MG tablet    Other Relevant Orders    Streptococcus A Rapid Scr w Reflx to PCR - Lab Collect (Completed)    Group A Streptococcus PCR Throat Swab      Patient noted to have a negative COVID.  Strep is negative patient is given 10 dexamethasone orally and ibuprofen.  Recommend salt water gargle warm tea with honey throat lozenges over-the-counter pain reliever.  For his asthma recommend utilizing his nebulizer treatment 1 available of every 4-6 hours as needed in addition to dexamethasone 6 mg daily for couple of days starting tomorrow.Patient advised if symptoms persist, worsen or new symptoms arise they are to seek medical care.  All patients questions addressed. Patient verbalized understanding and agreement with plan.              Nicotine/Tobacco Cessation:  He reports that he has been smoking. He has been smoking about 0.20 packs per day. He has never used smokeless tobacco.  Nicotine/Tobacco Cessation Plan:             No follow-ups on file.    Anne Contreras NP  [facility] DIANA Elias is a 32 year old accompanied by his son, presenting for the following health issues:  Asthma  (Med check. Had fever and cough the past few days. At home COVID test negative. Now having sore throat and SOB. Fevers are on and off. Having chills and sweating. Did breath in lead paint last week at work )      History of Present Illness     Asthma:  He presents for follow up of asthma.  He has no cough, no wheezing, and some shortness of breath. He is using a relief medication a few times a week. He does not have a controller medication. Patient is aware of the following triggers: animal dander, cold air, dust mites, emotions, exercise or sports, humidity, insects/rodents, occupational exposure, pollens and smoke. The patient has not had a visit to the Emergency Room, Urgent Care or Hospital due to asthma since the last clinic visit.     He eats 0-1 servings of fruits and vegetables daily.He consumes 2 sweetened beverage(s) daily.He exercises with enough effort to increase his heart rate 60 or more minutes per day.  He exercises with enough effort to increase his heart rate 7 days per week.   He is taking medications regularly.             Review of Systems   Unremarkable other than listed above and below         Objective    /80 (BP Location: Right arm, Patient Position: Sitting, Cuff Size: Adult Regular)   Pulse 75   Temp 98.1  F (36.7  C) (Oral)   Resp 20   Wt 89.3 kg (196 lb 12.8 oz)   SpO2 99%   BMI 31.76 kg/m    Body mass index is 31.76 kg/m .  Physical Exam   GENERAL: healthy, alert and no distress  EYES: Eyes grossly normal to inspection, PERRL and conjunctivae and sclerae normal  HENT: ear canals and TM's normal, nose and mouth without ulcers or lesions post pharynx erythema noted  NECK: Mild adenopathy appreciated  RESP: Diminished at the bases grossly clear throughout  wheezes  CV: regular rate and rhythm, normal S1 S2, no S3 or S4, no murmur, click or rub, no peripheral edema and peripheral pulses strong  MS: no gross musculoskeletal defects noted, no edema  PSYCH: mentation appears  normal, affect normal/bright              .  ..

## 2022-11-16 ENCOUNTER — OFFICE VISIT (OUTPATIENT)
Dept: FAMILY MEDICINE | Facility: CLINIC | Age: 32
End: 2022-11-16
Payer: COMMERCIAL

## 2022-11-16 VITALS
DIASTOLIC BLOOD PRESSURE: 69 MMHG | HEART RATE: 70 BPM | WEIGHT: 200 LBS | BODY MASS INDEX: 32.28 KG/M2 | TEMPERATURE: 97.9 F | SYSTOLIC BLOOD PRESSURE: 126 MMHG | RESPIRATION RATE: 16 BRPM | OXYGEN SATURATION: 98 %

## 2022-11-16 DIAGNOSIS — J45.41 MODERATE PERSISTENT ASTHMA WITH ACUTE EXACERBATION: ICD-10-CM

## 2022-11-16 DIAGNOSIS — B35.3 TINEA PEDIS OF RIGHT FOOT: Primary | ICD-10-CM

## 2022-11-16 DIAGNOSIS — T14.8XXA BLISTER: ICD-10-CM

## 2022-11-16 PROCEDURE — 90677 PCV20 VACCINE IM: CPT | Performed by: PHYSICIAN ASSISTANT

## 2022-11-16 PROCEDURE — 90472 IMMUNIZATION ADMIN EACH ADD: CPT | Performed by: PHYSICIAN ASSISTANT

## 2022-11-16 PROCEDURE — 99214 OFFICE O/P EST MOD 30 MIN: CPT | Mod: 25 | Performed by: PHYSICIAN ASSISTANT

## 2022-11-16 PROCEDURE — 90471 IMMUNIZATION ADMIN: CPT | Performed by: PHYSICIAN ASSISTANT

## 2022-11-16 PROCEDURE — 90686 IIV4 VACC NO PRSV 0.5 ML IM: CPT | Performed by: PHYSICIAN ASSISTANT

## 2022-11-16 RX ORDER — CLOTRIMAZOLE 1 %
CREAM (GRAM) TOPICAL 2 TIMES DAILY
Qty: 45 G | Refills: 0 | Status: SHIPPED | OUTPATIENT
Start: 2022-11-16 | End: 2022-11-30

## 2022-11-16 RX ORDER — FLUTICASONE PROPIONATE AND SALMETEROL 500; 50 UG/1; UG/1
1 POWDER RESPIRATORY (INHALATION) 2 TIMES DAILY
Qty: 1 EACH | Refills: 11 | Status: SHIPPED | OUTPATIENT
Start: 2022-11-16 | End: 2024-01-06

## 2022-11-16 RX ORDER — ALBUTEROL SULFATE 90 UG/1
1-2 AEROSOL, METERED RESPIRATORY (INHALATION) EVERY 4 HOURS PRN
Qty: 18 G | Refills: 1 | Status: SHIPPED | OUTPATIENT
Start: 2022-11-16 | End: 2023-03-31

## 2022-11-16 ASSESSMENT — ASTHMA QUESTIONNAIRES
QUESTION_1 LAST FOUR WEEKS HOW MUCH OF THE TIME DID YOUR ASTHMA KEEP YOU FROM GETTING AS MUCH DONE AT WORK, SCHOOL OR AT HOME: SOME OF THE TIME
ACT_TOTALSCORE: 15
ACT_TOTALSCORE: 15
QUESTION_3 LAST FOUR WEEKS HOW OFTEN DID YOUR ASTHMA SYMPTOMS (WHEEZING, COUGHING, SHORTNESS OF BREATH, CHEST TIGHTNESS OR PAIN) WAKE YOU UP AT NIGHT OR EARLIER THAN USUAL IN THE MORNING: NOT AT ALL
QUESTION_5 LAST FOUR WEEKS HOW WOULD YOU RATE YOUR ASTHMA CONTROL: SOMEWHAT CONTROLLED
QUESTION_2 LAST FOUR WEEKS HOW OFTEN HAVE YOU HAD SHORTNESS OF BREATH: ONCE A DAY
QUESTION_4 LAST FOUR WEEKS HOW OFTEN HAVE YOU USED YOUR RESCUE INHALER OR NEBULIZER MEDICATION (SUCH AS ALBUTEROL): ONE OR TWO TIMES PER DAY

## 2022-11-16 NOTE — PATIENT INSTRUCTIONS
I suggest you apply the topical antibiotic ointment I gave you twice per day for the next 5 days to the wound area.  Also start a topical antifungal cream, also twice per day to the foot.     Your cough is a sign that your asthma is not well controlled.  Restart the advair.  Reach out if symptoms do not improve.

## 2022-11-16 NOTE — PROGRESS NOTES
Assessment & Plan     Tinea pedis of right foot    Will start antifungal cream to be applied BID x 2 weeks, see epic for orders.  Patient is to keep area clean and dry, he is to avoid contact of this area with others, as it is contagious.  Patient education materials given during visit today (Tinea Pedis)    - clotrimazole (LOTRIMIN) 1 % external cream; Apply topically 2 times daily for 14 days    Blister  Discussed blister care, do not recommend unroofing blisters (especially with a dirty razor blade).  Discussed risk of infection and symptoms to monitor for.  I suggest you apply the topical antibiotic ointment I gave you twice per day for the next 5 days to the wound area.  Also start a topical antifungal cream, also twice per day to the foot.     Moderate persistent asthma with acute exacerbation  Your cough is a sign that your asthma is not well controlled.  Restart the advair.  Reach out if symptoms do not improve.     AAP completed and reviewed with patient.     - fluticasone-salmeterol (ADVAIR DISKUS) 500-50 MCG/ACT inhaler; Inhale 1 puff into the lungs 2 times daily    Flu shot and pneumonia shot advised and given today    35 minutes spent on the date of the encounter doing chart review, history and exam, documentation and further activities per the note           Return in about 4 weeks (around 12/14/2022) for a recheck if symptoms do not improve.    Ariana Gonzalez PA-C  [facility] ADELA Elias is a 32 year old accompanied by his son, presenting for the following health issues:  Derm Problem      Patient arrived to discuss left foot concerns; symptoms started off as a blister per pt, was itching and getting bigger over time.  Started when his foot was wet in his boots for a few days.  Pt removed blister himself (with a dirty razor blade) and the area has since turned black and gotten larger. When pt walks he reports it is mildly discomforting, still itchy, after long  day of wearing sock scab becomes soft and falls off sometimes.      **Has also had a productive cough x 1 month.   Home covid tests negative.   Had a fever when the cough started x 1 week, that resolved.  Cough seems unchanged, mainly occurs when he is working outside in the cold.  Has not been using advair because he just wants to limit medications.  No side effects or issues with it.     History of Present Illness       Reason for visit:  Foot and refills    He eats 0-1 servings of fruits and vegetables daily.He consumes 3 sweetened beverage(s) daily.He exercises with enough effort to increase his heart rate 60 or more minutes per day.  He exercises with enough effort to increase his heart rate 7 days per week.   He is taking medications regularly.             Review of Systems   Constitutional, HEENT, cardiovascular, pulmonary, gi and gu systems are negative, except as otherwise noted.      Objective    /69 (BP Location: Left arm, Patient Position: Chair, Cuff Size: Adult Large)   Pulse 70   Temp 97.9  F (36.6  C) (Tympanic)   Resp 16   Wt 90.7 kg (200 lb)   SpO2 98%   BMI 32.28 kg/m    Body mass index is 32.28 kg/m .  Physical Exam   GENERAL: healthy, alert and no distress  RESP: lungs clear to auscultation - no rales, rhonchi or wheezes  CV: regular rate and rhythm, normal S1 S2, no S3 or S4, no murmur, click or rub, no peripheral edema and peripheral pulses strong  MS: no gross musculoskeletal defects noted, no edema  Right foot:  Scaly dry skin noted on heels and around foot.  No rash between toes.  Healing wound noted (approximately the size of a quarter) with slight yellow crusting, no surrounding erythema or warmth.

## 2022-11-16 NOTE — LETTER
My Asthma Action Plan    Name: Jada Lion   YOB: 1990  Date: 11/16/2022   My doctor: Ariana Gonzalez PA-C   My clinic: [facility]        My Control Medicine: Fluticasone propionate + salmeterol (Advair Diskus or Wixela Inhub) -  500/50 mcg 1 puff twice per day  My Rescue Medicine: Albuterol (Proair/Ventolin/Proventil HFA) 2-4 puffs EVERY 4 HOURS as needed. Use a spacer if recommended by your provider.   My Asthma Severity:   Moderate Persistent  Know your asthma triggers: cold air, viral infections  dust, cold air, outside environment            GREEN ZONE   Good Control    I feel good    No cough or wheeze    Can work, sleep and play without asthma symptoms       Take your asthma control medicine every day.     1. If exercise triggers your asthma, take your rescue medication    15 minutes before exercise or sports, and    During exercise if you have asthma symptoms  2. Spacer to use with inhaler: If you have a spacer, make sure to use it with your inhaler             YELLOW ZONE Getting Worse  I have ANY of these:    I do not feel good    Cough or wheeze    Chest feels tight    Wake up at night   1. Keep taking your Green Zone medications  2. Start taking your rescue medicine:    every 20 minutes for up to 1 hour. Then every 4 hours for 24-48 hours.  3. If you stay in the Yellow Zone for more than 12-24 hours, contact your doctor.  4. If you do not return to the Green Zone in 12-24 hours or you get worse, start taking your oral steroid medicine if prescribed by your provider.           RED ZONE Medical Alert - Get Help  I have ANY of these:    I feel awful    Medicine is not helping    Breathing getting harder    Trouble walking or talking    Nose opens wide to breathe       1. Take your rescue medicine NOW  2. If your provider has prescribed an oral steroid medicine, start taking it NOW  3. Call your doctor NOW  4. If you are still in the Red Zone after 20 minutes and  you have not reached your doctor:    Take your rescue medicine again and    Call 911 or go to the emergency room right away    See your regular doctor within 2 weeks of an Emergency Room or Urgent Care visit for follow-up treatment.          Annual Reminders:  Meet with Asthma Educator,  Flu Shot in the Fall, consider Pneumonia Vaccination for patients with asthma (aged 19 and older).    Pharmacy:    [facility] [id number] [place] [facility], [place] - [address]  [id number], [place] - [address]  [id number], [place] - [address] [facility] [address] [place] [address] [id number] [address] [facility]  AT [id number] [place], [place] - [address] [facility]  AT [facility] OF [place] [id number]    Electronically signed by Ariana Gonzalez PA-C   Date: 11/16/22                      Asthma Triggers  How To Control Things That Make Your Asthma Worse    Triggers are things that make your asthma worse.  Look at the list below to help you find your triggers and what you can do about them.  You can help prevent asthma flare-ups by staying away from your triggers.      Trigger                                                          What you can do   Cigarette Smoke  Tobacco smoke can make asthma worse. Do not allow smoking in your home, car or around you.  Be sure no one smokes at a child s day care or school.  If you smoke, ask your health care provider for ways to help you quit.  Ask family members to quit too.  Ask your health care provider for a referral to Quit Plan to help you quit smoking, or call 1-647-529-PLAN.     Colds, Flu, Bronchitis  These are common triggers of asthma. Wash your hands often.  Don t touch your eyes, nose or mouth.  Get a flu shot every year.     Dust Mites  These are tiny bugs that live in cloth or carpet. They are too small  to see. Wash sheets and blankets in hot water every week.   Encase pillows and mattress in dust mite proof covers.  Avoid having carpet if you can. If you have carpet, vacuum weekly.   Use a dust mask and HEPA vacuum.   Pollen and Outdoor Mold  Some people are allergic to trees, grass, or weed pollen, or molds. Try to keep your windows closed.  Limit time out doors when pollen count is high.   Ask you health care provider about taking medicine during allergy season.     Animal Dander  Some people are allergic to skin flakes, urine or saliva from pets with fur or feathers. Keep pets with fur or feathers out of your home.    If you can t keep the pet outdoors, then keep the pet out of your bedroom.  Keep the bedroom door closed.  Keep pets off cloth furniture and away from stuffed toys.     Mice, Rats, and Cockroaches   Some people are allergic to the waste from these pests.   Cover food and garbage.  Clean up spills and food crumbs.  Store grease in the refrigerator.   Keep food out of the bedroom.   Indoor Mold  This can be a trigger if your home has high moisture. Fix leaking faucets, pipes, or other sources of water.   Clean moldy surfaces.  Dehumidify basement if it is damp and smelly.   Smoke, Strong Odors, and Sprays  These can reduce air quality. Stay away from strong odors and sprays, such as perfume, powder, hair spray, paints, smoke incense, paint, cleaning products, candles and new carpet.   Exercise or Sports  Some people with asthma have this trigger. Be active!  Ask your doctor about taking medicine before sports or exercise to prevent symptoms.    Warm up for 5-10 minutes before and after sports or exercise.     Other Triggers of Asthma  Cold air:  Cover your nose and mouth with a scarf.  Sometimes laughing or crying can be a trigger.  Some medicines and food can trigger asthma.

## 2023-03-31 DIAGNOSIS — J45.41 MODERATE PERSISTENT ASTHMA WITH ACUTE EXACERBATION: Primary | ICD-10-CM

## 2023-03-31 RX ORDER — ALBUTEROL SULFATE 90 UG/1
1-2 AEROSOL, METERED RESPIRATORY (INHALATION) EVERY 4 HOURS PRN
Qty: 18 G | Refills: 1 | Status: SHIPPED | OUTPATIENT
Start: 2023-03-31 | End: 2023-05-01

## 2023-03-31 NOTE — TELEPHONE ENCOUNTER
"Routing refill request to provider for review/approval because:  Patient has not been seen in the last 6 months  ACT score needs review    Last Written Prescription Date:  11/16/2022  Last Fill Quantity: 18 g,  # refills: 1   Last office visit provider:  11/16/2022     Requested Prescriptions   Pending Prescriptions Disp Refills     albuterol (PROAIR HFA/PROVENTIL HFA/VENTOLIN HFA) 108 (90 Base) MCG/ACT inhaler 18 g 1     Sig: Inhale 1-2 puffs into the lungs every 4 hours as needed for shortness of breath or wheezing       Asthma Maintenance Inhalers - Anticholinergics Failed - 3/31/2023  3:43 PM        Failed - Asthma control assessment score within normal limits in last 6 months     Please review ACT score.           Failed - Recent (6 mo) or future (30 days) visit within the authorizing provider's specialty     Patient had office visit in the last 6 months or has a visit in the next 30 days with authorizing provider or within the authorizing provider's specialty.  See \"Patient Info\" tab in inbasket, or \"Choose Columns\" in Meds & Orders section of the refill encounter.            Passed - Patient is age 12 years or older        Passed - Medication is active on med list       Short-Acting Beta Agonist Inhalers Protocol  Failed - 3/31/2023  3:43 PM        Failed - Asthma control assessment score within normal limits in last 6 months     Please review ACT score.           Failed - Recent (6 mo) or future (30 days) visit within the authorizing provider's specialty     Patient had office visit in the last 6 months or has a visit in the next 30 days with authorizing provider or within the authorizing provider's specialty.  See \"Patient Info\" tab in inbasket, or \"Choose Columns\" in Meds & Orders section of the refill encounter.            Passed - Patient is age 12 or older        Passed - Medication is active on med list             CARLOS GALLOWAY RN 03/31/23 3:44 PM  "

## 2023-04-29 DIAGNOSIS — J45.41 MODERATE PERSISTENT ASTHMA WITH ACUTE EXACERBATION: ICD-10-CM

## 2023-05-01 RX ORDER — ALBUTEROL SULFATE 90 UG/1
1-2 AEROSOL, METERED RESPIRATORY (INHALATION) EVERY 4 HOURS PRN
Qty: 18 G | Refills: 1 | Status: SHIPPED | OUTPATIENT
Start: 2023-05-01 | End: 2024-01-06

## 2023-06-14 ENCOUNTER — OFFICE VISIT (OUTPATIENT)
Dept: FAMILY MEDICINE | Facility: CLINIC | Age: 33
End: 2023-06-14
Payer: COMMERCIAL

## 2023-06-14 VITALS
HEIGHT: 66 IN | OXYGEN SATURATION: 100 % | TEMPERATURE: 97.8 F | BODY MASS INDEX: 31.08 KG/M2 | WEIGHT: 193.4 LBS | SYSTOLIC BLOOD PRESSURE: 116 MMHG | DIASTOLIC BLOOD PRESSURE: 72 MMHG | RESPIRATION RATE: 14 BRPM | HEART RATE: 68 BPM

## 2023-06-14 DIAGNOSIS — R10.84 ABDOMINAL PAIN, GENERALIZED: Primary | ICD-10-CM

## 2023-06-14 DIAGNOSIS — R20.2 PARESTHESIAS: ICD-10-CM

## 2023-06-14 LAB
ALBUMIN SERPL BCG-MCNC: 4.4 G/DL (ref 3.5–5.2)
ALBUMIN UR-MCNC: NEGATIVE MG/DL
ALP SERPL-CCNC: 61 U/L (ref 40–129)
ALT SERPL W P-5'-P-CCNC: 39 U/L (ref 0–70)
ANION GAP SERPL CALCULATED.3IONS-SCNC: 13 MMOL/L (ref 7–15)
APPEARANCE UR: CLEAR
AST SERPL W P-5'-P-CCNC: 35 U/L (ref 0–45)
BILIRUB SERPL-MCNC: 0.3 MG/DL
BILIRUB UR QL STRIP: NEGATIVE
BUN SERPL-MCNC: 19.6 MG/DL (ref 6–20)
CALCIUM SERPL-MCNC: 9.4 MG/DL (ref 8.6–10)
CHLORIDE SERPL-SCNC: 104 MMOL/L (ref 98–107)
COLOR UR AUTO: YELLOW
CREAT SERPL-MCNC: 0.92 MG/DL (ref 0.67–1.17)
DEPRECATED HCO3 PLAS-SCNC: 23 MMOL/L (ref 22–29)
ERYTHROCYTE [DISTWIDTH] IN BLOOD BY AUTOMATED COUNT: 12.6 % (ref 10–15)
GFR SERPL CREATININE-BSD FRML MDRD: >90 ML/MIN/1.73M2
GLUCOSE SERPL-MCNC: 104 MG/DL (ref 70–99)
GLUCOSE UR STRIP-MCNC: NEGATIVE MG/DL
HBA1C MFR BLD: 5.6 % (ref 0–5.6)
HCT VFR BLD AUTO: 44 % (ref 40–53)
HGB BLD-MCNC: 14.5 G/DL (ref 13.3–17.7)
HGB UR QL STRIP: NEGATIVE
KETONES UR STRIP-MCNC: NEGATIVE MG/DL
LEUKOCYTE ESTERASE UR QL STRIP: NEGATIVE
LIPASE SERPL-CCNC: 23 U/L (ref 13–60)
MCH RBC QN AUTO: 30.1 PG (ref 26.5–33)
MCHC RBC AUTO-ENTMCNC: 33 G/DL (ref 31.5–36.5)
MCV RBC AUTO: 92 FL (ref 78–100)
NITRATE UR QL: NEGATIVE
PH UR STRIP: 6 [PH] (ref 5–7)
PLATELET # BLD AUTO: 196 10E3/UL (ref 150–450)
POTASSIUM SERPL-SCNC: 4.2 MMOL/L (ref 3.4–5.3)
PROT SERPL-MCNC: 7.4 G/DL (ref 6.4–8.3)
RBC # BLD AUTO: 4.81 10E6/UL (ref 4.4–5.9)
SODIUM SERPL-SCNC: 140 MMOL/L (ref 136–145)
SP GR UR STRIP: 1.02 (ref 1–1.03)
UROBILINOGEN UR STRIP-ACNC: 0.2 E.U./DL
WBC # BLD AUTO: 4.8 10E3/UL (ref 4–11)

## 2023-06-14 PROCEDURE — 80053 COMPREHEN METABOLIC PANEL: CPT | Performed by: PHYSICIAN ASSISTANT

## 2023-06-14 PROCEDURE — 99214 OFFICE O/P EST MOD 30 MIN: CPT | Performed by: PHYSICIAN ASSISTANT

## 2023-06-14 PROCEDURE — 83690 ASSAY OF LIPASE: CPT | Performed by: PHYSICIAN ASSISTANT

## 2023-06-14 PROCEDURE — 36415 COLL VENOUS BLD VENIPUNCTURE: CPT | Performed by: PHYSICIAN ASSISTANT

## 2023-06-14 PROCEDURE — 81003 URINALYSIS AUTO W/O SCOPE: CPT | Performed by: PHYSICIAN ASSISTANT

## 2023-06-14 PROCEDURE — 83036 HEMOGLOBIN GLYCOSYLATED A1C: CPT | Performed by: PHYSICIAN ASSISTANT

## 2023-06-14 PROCEDURE — 85027 COMPLETE CBC AUTOMATED: CPT | Performed by: PHYSICIAN ASSISTANT

## 2023-06-14 ASSESSMENT — ASTHMA QUESTIONNAIRES
ACT_TOTALSCORE: 23
QUESTION_3 LAST FOUR WEEKS HOW OFTEN DID YOUR ASTHMA SYMPTOMS (WHEEZING, COUGHING, SHORTNESS OF BREATH, CHEST TIGHTNESS OR PAIN) WAKE YOU UP AT NIGHT OR EARLIER THAN USUAL IN THE MORNING: NOT AT ALL
QUESTION_4 LAST FOUR WEEKS HOW OFTEN HAVE YOU USED YOUR RESCUE INHALER OR NEBULIZER MEDICATION (SUCH AS ALBUTEROL): ONCE A WEEK OR LESS
ACT_TOTALSCORE: 23
QUESTION_2 LAST FOUR WEEKS HOW OFTEN HAVE YOU HAD SHORTNESS OF BREATH: ONCE OR TWICE A WEEK
QUESTION_1 LAST FOUR WEEKS HOW MUCH OF THE TIME DID YOUR ASTHMA KEEP YOU FROM GETTING AS MUCH DONE AT WORK, SCHOOL OR AT HOME: NONE OF THE TIME
QUESTION_5 LAST FOUR WEEKS HOW WOULD YOU RATE YOUR ASTHMA CONTROL: COMPLETELY CONTROLLED

## 2023-06-14 ASSESSMENT — PAIN SCALES - GENERAL: PAINLEVEL: EXTREME PAIN (8)

## 2023-06-14 ASSESSMENT — ENCOUNTER SYMPTOMS: ABDOMINAL PAIN: 1

## 2023-06-14 NOTE — PATIENT INSTRUCTIONS
Thomas Elias,    Thank you for allowing [facility] to manage your care.    I am unsure of the cause of your symptoms, but we will see what our workup shows. This could be gastritis. Use over the counter antacids and omeprazole to treat this as a possible cause.    If you develop worsening/changing symptoms at any time, please call 911 or go to the emergency department for evaluation.    I ordered some lab work. Please go to the laboratory to get your studies.    I sent your prescriptions to your pharmacy.    I ordered a CT. Please call diagnostic imaging [phone] to schedule your test if you are not improving in the next 24-48 hours.    Please allow 1-2 business days for our office to contact you in regards to your laboratory/radiological studies.  If not done so, I encourage you to login into [email or web address] ([email or web address] to review your results as well.     If you have any questions or concerns, please feel free to call us at [phone]    Sincerely,    Albin Parker PA-C    Did you know?      You can schedule a video visit for follow-up appointments as well as future appointments for certain conditions.  Please see the below link.     [email or web address]    If you have not already done so,  I encourage you to sign up for [employer] ([email or web address]).  This will allow you to review your results, securely communicate with a provider, and schedule virtual visits as well.

## 2023-06-14 NOTE — PROGRESS NOTES
"  Assessment & Plan   Problem List Items Addressed This Visit    None  Visit Diagnoses     Abdominal pain, generalized    -  Primary    Relevant Medications    omeprazole (PRILOSEC) 20 MG DR capsule    Other Relevant Orders    UA Macroscopic with reflex to Microscopic and Culture (Completed)    Comprehensive metabolic panel (BMP + Alb, Alk Phos, ALT, AST, Total. Bili, TP) (Completed)    Lipase (Completed)    UA Macroscopic with reflex to Microscopic and Culture    CBC with platelets (Completed)    CT Abdomen Pelvis w Contrast    Paresthesias        Relevant Orders    Hemoglobin A1c (Completed)         Symptoms most consistent with gastritis vs PUD, but no melena or blood in stool to suggest bleeding ulcer. Benign exam. Labs reassuring. Will order CT if not improving with PPI and he is to be re-evaluated in UC/ER if worsening at any time. Follow up in next week if  Not improving and CT reassuring.    Complete history and physical exam as below. Afebrile with normal vital signs.    DDx and Dx discussed with and explained to the pt to their satisfaction.  All questions were answered at this time. Pt expressed understanding of and agreement with this dx, tx, and plan. No further workup warranted and standard medication warnings given. I have given the patient a list of pertinent indications for re-evaluation. Will go to the Emergency Department if symptoms worsen or new concerning symptoms arise. Patient left in no apparent distress.     Ordering of each unique test  Prescription drug management      BMI:   Estimated body mass index is 31.05 kg/m  as calculated from the following:    Height as of this encounter: 1.681 m (5' 6.18\").    Weight as of this encounter: 87.7 kg (193 lb 6.4 oz).     See Patient Instructions    JAMEEL Friedman [facility] ADELA Elias is a 32 year old, presenting for the following health issues:  Abdominal Pain and Tingling        6/14/2023    10:01 AM " "  Additional Questions   Roomed by elida reaves   Accompanied by no one         6/14/2023    10:01 AM   Patient Reported Additional Medications   Patient reports taking the following new medications none     Abdominal Pain     History of Present Illness       Reason for visit:  Severe stomache pain and smptoms of diabetes  Symptom onset:  3-7 days ago  Symptoms include:  Stomache pain severe tingly fingertips hotfeet blurr visionat times  Symptom intensity:  Severe  Symptom progression:  Staying the same  Had these symptoms before:  No  What makes it worse:  Eating and drinking  What makes it better:  Crunching in a ball    He eats 0-1 servings of fruits and vegetables daily.He consumes 3 sweetened beverage(s) daily.He exercises with enough effort to increase his heart rate 60 or more minutes per day.  He exercises with enough effort to increase his heart rate 7 days per week.   He is taking medications regularly.     Generalized abdominal pain that began after a meal last night. 8/10. No radiation. Worse with food. Appetite is good. S/p inguinal hernia surgery on left. No n/v, fever/chills,     Tingling in feet and fingertips over the last 6 weeks. Normally has loos stools. No melena or blood.     Review of Systems   Gastrointestinal: Positive for abdominal pain.      Constitutional, HEENT, cardiovascular, pulmonary, gi and gu systems are negative, except as otherwise noted.      Objective    /72   Pulse 68   Temp 97.8  F (36.6  C) (Tympanic)   Resp 14   Ht 1.681 m (5' 6.18\")   Wt 87.7 kg (193 lb 6.4 oz)   SpO2 100%   BMI 31.05 kg/m    Body mass index is 31.05 kg/m .  Physical Exam  Vitals and nursing note reviewed.   Constitutional:       General: He is not in acute distress.     Appearance: He is not ill-appearing or diaphoretic.   HENT:      Head: Normocephalic and atraumatic.      Mouth/Throat:      Mouth: Mucous membranes are moist.   Eyes:      Conjunctiva/sclera: Conjunctivae normal. "   Cardiovascular:      Rate and Rhythm: Normal rate and regular rhythm.      Heart sounds: Normal heart sounds. No murmur heard.     No friction rub. No gallop.   Pulmonary:      Effort: Pulmonary effort is normal. No respiratory distress.      Breath sounds: Normal breath sounds. No stridor. No wheezing, rhonchi or rales.   Abdominal:      General: Bowel sounds are normal. There is no distension.      Palpations: Abdomen is soft. There is no mass.      Tenderness: There is no abdominal tenderness. There is no right CVA tenderness, left CVA tenderness, guarding or rebound.      Hernia: No hernia is present.   Skin:     General: Skin is warm and dry.   Neurological:      General: No focal deficit present.      Mental Status: He is alert. Mental status is at baseline.   Psychiatric:         Mood and Affect: Mood normal.         Behavior: Behavior normal.          Results for orders placed or performed in visit on 06/14/23   UA Macroscopic with reflex to Microscopic and Culture     Status: Normal    Specimen: Urine, Clean Catch   Result Value Ref Range    Color Urine Yellow Colorless, Straw, Light Yellow, Yellow    Appearance Urine Clear Clear    Glucose Urine Negative Negative mg/dL    Bilirubin Urine Negative Negative    Ketones Urine Negative Negative mg/dL    Specific Gravity Urine 1.025 1.003 - 1.035    Blood Urine Negative Negative    pH Urine 6.0 5.0 - 7.0    Protein Albumin Urine Negative Negative mg/dL    Urobilinogen Urine 0.2 0.2, 1.0 E.U./dL    Nitrite Urine Negative Negative    Leukocyte Esterase Urine Negative Negative    Narrative    Microscopic not indicated   Hemoglobin A1c     Status: Normal   Result Value Ref Range    Hemoglobin A1C 5.6 0.0 - 5.6 %   Comprehensive metabolic panel (BMP + Alb, Alk Phos, ALT, AST, Total. Bili, TP)     Status: Abnormal   Result Value Ref Range    Sodium 140 136 - 145 mmol/L    Potassium 4.2 3.4 - 5.3 mmol/L    Chloride 104 98 - 107 mmol/L    Carbon Dioxide (CO2) 23 22 -  29 mmol/L    Anion Gap 13 7 - 15 mmol/L    Urea Nitrogen 19.6 6.0 - 20.0 mg/dL    Creatinine 0.92 0.67 - 1.17 mg/dL    Calcium 9.4 8.6 - 10.0 mg/dL    Glucose 104 (H) 70 - 99 mg/dL    Alkaline Phosphatase 61 40 - 129 U/L    AST 35 0 - 45 U/L    ALT 39 0 - 70 U/L    Protein Total 7.4 6.4 - 8.3 g/dL    Albumin 4.4 3.5 - 5.2 g/dL    Bilirubin Total 0.3 <=1.2 mg/dL    GFR Estimate >90 >60 mL/min/1.73m2   Lipase     Status: Normal   Result Value Ref Range    Lipase 23 13 - 60 U/L   CBC with platelets     Status: Normal   Result Value Ref Range    WBC Count 4.8 4.0 - 11.0 10e3/uL    RBC Count 4.81 4.40 - 5.90 10e6/uL    Hemoglobin 14.5 13.3 - 17.7 g/dL    Hematocrit 44.0 40.0 - 53.0 %    MCV 92 78 - 100 fL    MCH 30.1 26.5 - 33.0 pg    MCHC 33.0 31.5 - 36.5 g/dL    RDW 12.6 10.0 - 15.0 %    Platelet Count 196 150 - 450 10e3/uL

## 2023-06-18 DIAGNOSIS — J45.40 MODERATE PERSISTENT ASTHMA WITHOUT COMPLICATION: ICD-10-CM

## 2023-06-19 RX ORDER — ALBUTEROL SULFATE 0.83 MG/ML
SOLUTION RESPIRATORY (INHALATION)
Qty: 225 ML | Refills: 0 | Status: SHIPPED | OUTPATIENT
Start: 2023-06-19 | End: 2024-01-06

## 2023-06-21 ENCOUNTER — HOSPITAL ENCOUNTER (EMERGENCY)
Facility: CLINIC | Age: 33
Discharge: HOME | End: 2023-06-22
Attending: EMERGENCY MEDICINE | Admitting: EMERGENCY MEDICINE
Payer: COMMERCIAL

## 2023-06-21 ENCOUNTER — APPOINTMENT (OUTPATIENT)
Dept: ULTRASOUND IMAGING | Facility: CLINIC | Age: 33
End: 2023-06-21
Attending: EMERGENCY MEDICINE
Payer: COMMERCIAL

## 2023-06-21 VITALS
OXYGEN SATURATION: 99 % | BODY MASS INDEX: 30.53 KG/M2 | HEIGHT: 66 IN | TEMPERATURE: 97.8 F | RESPIRATION RATE: 18 BRPM | HEART RATE: 91 BPM | SYSTOLIC BLOOD PRESSURE: 113 MMHG | DIASTOLIC BLOOD PRESSURE: 65 MMHG | WEIGHT: 190 LBS

## 2023-06-21 DIAGNOSIS — I80.8 SUPERFICIAL THROMBOPHLEBITIS OF RIGHT UPPER EXTREMITY: ICD-10-CM

## 2023-06-21 PROCEDURE — 93971 EXTREMITY STUDY: CPT | Mod: RT

## 2023-06-21 PROCEDURE — 99284 EMERGENCY DEPT VISIT MOD MDM: CPT | Mod: 25

## 2023-06-21 PROCEDURE — 99283 EMERGENCY DEPT VISIT LOW MDM: CPT | Performed by: EMERGENCY MEDICINE

## 2023-06-22 NOTE — ED PROVIDER NOTES
History     Chief Complaint   Patient presents with     Arm Pain     HPI  Jada Lion is a 32 year old male with past medical history significant for asthma who presents emergency department complaining of right antecubital arm pain swelling.  Patient recently discharged from [facility] after being admitted for obstipation.  He had a right antecubital IV in place during the hospitalization and now has what appears to be swelling in the antecubital area with an area of hardened possible vein.  Patient states it is an aching pain in this region denies any numbness weakness.  No fevers or chills has not had any other complaints denies any chest pain shortness of breath abdominal pain.  No areas of rash are noted.    Allergies:  Allergies   Allergen Reactions     Singulair [Montelukast]      Abdominal pain       Problem List:    Patient Active Problem List    Diagnosis Date Noted     Acute left-sided low back pain without sciatica 03/05/2018     Priority: Medium     Cheilitis 08/23/2017     Priority: Medium     Dry eyes 08/23/2017     Priority: Medium     Rotator cuff injury, right, initial encounter 05/17/2017     Priority: Medium     Hip pain, left 07/21/2016     Priority: Medium     Tobacco use disorder 07/21/2016     Priority: Medium     Moderate persistent asthma without complication 04/08/2016     Priority: Medium     Overweight (BMI 25.0-29.9) 02/12/2015     Priority: Medium     CARDIOVASCULAR SCREENING; LDL GOAL LESS THAN 160 02/12/2015     Priority: Medium     Plantar fasciitis 08/29/2014     Priority: Medium     Moderate persistent asthma 08/13/2014     Priority: Medium     Seasonal allergic rhinitis 08/13/2014     Priority: Medium        Past Medical History:    Past Medical History:   Diagnosis Date     Obesity      Uncomplicated asthma        Past Surgical History:    Past Surgical History:   Procedure Laterality Date     DENTAL SURGERY  1-2015    wisdom teeth        Family History:    Family  "History   Problem Relation Age of Onset     Diabetes Mother      Hypertension Mother      Asthma Mother      Other Cancer Maternal Grandfather      Other Cancer Paternal Grandfather         unsure if heart disease      Coronary Artery Disease No family hx of        Social History:  Marital Status:  Single [1]  Social History     Tobacco Use     Smoking status: Every Day     Packs/day: 0.20     Types: Cigarettes     Smokeless tobacco: Never   Vaping Use     Vaping Use: Never used   Substance Use Topics     Alcohol use: Yes     Drug use: No        Medications:    albuterol (PROAIR HFA/PROVENTIL HFA/VENTOLIN HFA) 108 (90 Base) MCG/ACT inhaler  albuterol (PROVENTIL) (2.5 MG/3ML) 0.083% neb solution  cetirizine (ZYRTEC) 10 MG tablet  dexamethasone (DECADRON) 6 MG tablet  fluticasone-salmeterol (ADVAIR DISKUS) 500-50 MCG/ACT inhaler  hydrocortisone 2.5 % cream  hydrOXYzine (VISTARIL) 25 MG capsule  olopatadine (PATANOL) 0.1 % ophthalmic solution  omeprazole (PRILOSEC) 20 MG DR capsule  order for DME  triamcinolone (KENALOG) 0.5 % external ointment          Review of Systems  As per HPI.  Physical Exam   BP: 113/65  Pulse: 91  Temp: 97.8  F (36.6  C)  Resp: 18  Height: 167.6 cm (5' 6\")  Weight: 86.2 kg (190 lb)  SpO2: 99 %      Physical Exam  Vitals and nursing note reviewed.   Constitutional:       General: He is not in acute distress.     Appearance: Normal appearance. He is not ill-appearing, toxic-appearing or diaphoretic.   HENT:      Head: Normocephalic and atraumatic.      Nose: Nose normal.      Mouth/Throat:      Mouth: Mucous membranes are moist.   Eyes:      Conjunctiva/sclera: Conjunctivae normal.   Cardiovascular:      Pulses: Normal pulses.   Pulmonary:      Effort: Pulmonary effort is normal.   Musculoskeletal:      Cervical back: Normal range of motion and neck supple.      Comments: There is mild swelling in the antecubital area right arm with former IV site present appears to be some tenderness to " palpation and swelling in this region and possible palpable vein.  Good strength range of motion in right upper extremity pulses sensation symmetrical.  No significant erythema present.   Skin:     General: Skin is warm and dry.   Neurological:      General: No focal deficit present.      Mental Status: He is alert.   Psychiatric:         Mood and Affect: Mood normal.         ED Course                 Procedures              Critical Care time:  none               Results for orders placed or performed during the hospital encounter of 06/21/23 (from the past 24 hour(s))   US Upper Extremity Venous Duplex Right    Narrative    EXAM: US UPPER EXTREMITY VENOUS DUPLEX RIGHT  LOCATION: [facility]  DATE: 6/22/2023    INDICATION: Arm swelling status post IV rule out DVT or superficial thrombophlebitis.  COMPARISON: None.  TECHNIQUE: Venous Duplex ultrasound of the right upper extremity with (when possible) and without compression, augmentation, and duplex. Color flow and spectral Doppler with waveform analysis performed.    FINDINGS: Ultrasound includes evaluation of the internal jugular vein, innominate vein, subclavian vein, axillary vein, and brachial vein. The superficial cephalic and basilic veins were also evaluated where seen.     RIGHT: No deep venous thrombosis. Occlusive superficial thrombophlebitis in the right cephalic vein from the antecubital fossa to the mid forearm measuring greater than 5 cm in length.      Impression    IMPRESSION:  1.  No deep venous thrombosis in the right upper extremity.    2.  Occlusive superficial thrombophlebitis in the right cephalic vein from the antecubital fossa to the mid forearmmeasuring greater than 5 cm in length.       Medications - No data to display    Assessments & Plan (with Medical Decision Making) records were reviewed including past medical history medications and allergies.  Patient's recent hospital admission was reviewed in detail.   Right upper extremity venous Doppler ultrasound was obtained.  This revealed no evidence of deep venous thrombosis of the right upper extremity with occlusive superficial thrombophlebitis of the right cephalic vein measuring greater than 5 cm.  Findings were discussed in detail with patient.  I advised him to use ibuprofen and Tylenol for pain and warm heat to the region.  This should resolve on its own but if any increased arm swelling pain redness or other symptoms present patient should immediately return for recheck.  He is in agreement this plan.     I have reviewed the nursing notes.    I have reviewed the findings, diagnosis, plan and need for follow up with the patient.         Final diagnoses:   Superficial thrombophlebitis of right upper extremity       6/21/2023   [facility] EMERGENCY DEPT     Armando Orozco MD  06/24/23 [id number]

## 2023-06-22 NOTE — DISCHARGE INSTRUCTIONS
return if symptoms worsen or new symptoms develop.  Follow-up with primary care physician next available.  Drink plenty of fluids.  If increased arm swelling pain numbness weakness redness or other symptoms present please return for further evaluation and care apply heat or warm cloths to the arm and use ibuprofen and Tylenol as needed for pain.

## 2023-06-22 NOTE — ED TRIAGE NOTES
Patient reports he was discharge from the hospital 6/17. Site where IV had been in place is painful. Reports he reported to staff it was painful when they flushed it while admit to hospital.      Triage Assessment     Row Name 06/21/23 2517       Triage Assessment (Adult)    Airway WDL WDL       Respiratory WDL    Respiratory WDL WDL       Skin Circulation/Temperature WDL    Skin Circulation/Temperature WDL WDL       Cardiac WDL    Cardiac WDL WDL       Peripheral/Neurovascular WDL    Peripheral Neurovascular WDL WDL       Cognitive/Neuro/Behavioral WDL    Cognitive/Neuro/Behavioral WDL WDL

## 2023-06-26 ENCOUNTER — PATIENT OUTREACH (OUTPATIENT)
Dept: CARE COORDINATION | Facility: CLINIC | Age: 33
End: 2023-06-26
Payer: COMMERCIAL

## 2023-06-26 NOTE — PROGRESS NOTES
[facility] Resource Center Contact  [facility]/Voicemail     Clinical Data: Care Coordination ED-sourced Outreach-     Outreach attempted x 2.  Left message on patient's voicemail, providing [facility]'s 24/7 scheduling and nurse triage phone number [phone] ([phone]) for questions/concerns and/or to schedule an appt with an [facility] provider.      Care Coordination introduction letter with explanation of Clinic Care Coordination services sent to patient via Magi[employer]. Clinic Care Coordination services remain available via referral if needed.    Plan: [facility] will do no further outreaches at this time.       DEVONTE English  Connected Care Resource [place], [facility]    *Connected Care Resource Team does NOT follow patient ongoing. Referrals are identified based on internal discharge reports and the outreach is to ensure patient has an understanding of their discharge instructions.

## 2023-06-26 NOTE — LETTER
Jada Lion  [address] [address]  [place] [address]    Dear Jada Lion,      I am a team member within the [facility] with [facility]. I recently tried to reach you to ensure you were doing well following a recent visit within our health system. I also wanted to take this chance to introduce Clinic Care Coordination.     Below is a description of Clinic Care Coordination and how this team can further assist you:       The Clinic Care Coordination team is made up of a Registered Nurse, , Financial Resource Worker, and a Community Health Worker who understand and can help navigate the health care system. The goal of clinic care coordination is to help you manage your health, improve access to care, and achieve optimal health outcomes. They work alongside your provider to assist you in determining your health and social needs, obtain health care and community resources, and provide you with necessary information and education. Clinic Care Coordination can work with you through any barriers and develop a care plan that helps coordinate and strengthen the relationship between you and your care team.    If you wish to connect with the Clinic Care Coordination Team, please let your [facility] Primary Care Provider or Clinic Care Team know and they can place a referral. The Clinic Care Coordination team will then reach out by phone to further support you.    We are focused on providing you with the highest-quality healthcare experience possible.    Sincerely,   Your care team with [facility]

## 2023-06-30 ENCOUNTER — OFFICE VISIT (OUTPATIENT)
Dept: FAMILY MEDICINE | Facility: CLINIC | Age: 33
End: 2023-06-30
Payer: COMMERCIAL

## 2023-06-30 VITALS
TEMPERATURE: 97.3 F | SYSTOLIC BLOOD PRESSURE: 138 MMHG | RESPIRATION RATE: 14 BRPM | OXYGEN SATURATION: 100 % | DIASTOLIC BLOOD PRESSURE: 78 MMHG | BODY MASS INDEX: 30.7 KG/M2 | WEIGHT: 191 LBS | HEIGHT: 66 IN | HEART RATE: 61 BPM

## 2023-06-30 DIAGNOSIS — R14.0 ABDOMINAL BLOATING: Primary | ICD-10-CM

## 2023-06-30 PROCEDURE — 86003 ALLG SPEC IGE CRUDE XTRC EA: CPT | Performed by: FAMILY MEDICINE

## 2023-06-30 PROCEDURE — 36415 COLL VENOUS BLD VENIPUNCTURE: CPT | Performed by: FAMILY MEDICINE

## 2023-06-30 PROCEDURE — 99214 OFFICE O/P EST MOD 30 MIN: CPT | Performed by: FAMILY MEDICINE

## 2023-06-30 RX ORDER — OMEPRAZOLE 40 MG/1
40 CAPSULE, DELAYED RELEASE ORAL DAILY
Qty: 90 CAPSULE | Refills: 0 | Status: SHIPPED | OUTPATIENT
Start: 2023-06-30 | End: 2023-07-21

## 2023-06-30 ASSESSMENT — ENCOUNTER SYMPTOMS
HEADACHES: 0
PALPITATIONS: 0
DIZZINESS: 0
SHORTNESS OF BREATH: 0
SORE THROAT: 0
CHILLS: 0
JOINT SWELLING: 0
MYALGIAS: 0
ARTHRALGIAS: 0
PARESTHESIAS: 0
FEVER: 0
WEAKNESS: 0
ABDOMINAL DISTENTION: 1
COUGH: 0
NERVOUS/ANXIOUS: 0
DIARRHEA: 1

## 2023-06-30 ASSESSMENT — PAIN SCALES - GENERAL: PAINLEVEL: NO PAIN (0)

## 2023-06-30 NOTE — PROGRESS NOTES
1. Abdominal bloating  - Allergy adult food panel; Future  - omeprazole (PRILOSEC) 40 MG DR capsule; Take 1 capsule (40 mg) by mouth daily  Dispense: 90 capsule; Refill: 0  - Adult GI  Referral - Consult Only; Future  - PRIMARY CARE FOLLOW-UP SCHEDULING; Future  - Allergy adult food panel        Mareln Elias is a 32 year old, presenting for the following health issues:  Hospital F/U, Diarrhea, and Arm Pain        6/30/2023     9:00 AM   Additional Questions   Roomed by elida reaves   Accompanied by no one         6/30/2023     9:00 AM   Patient Reported Additional Medications   Patient reports taking the following new medications stool softner, Miralax     Diarrhea  Pertinent negatives include no arthralgias, chest pain, chills, congestion, coughing, fever, headaches, joint swelling, myalgias, rash, sore throat or weakness.   Arm Pain  Pertinent negatives include no arthralgias, chest pain, chills, congestion, coughing, fever, headaches, joint swelling, myalgias, rash, sore throat or weakness.          Hospital Follow-up Visit:    Hospital/Nursing Home/ Rehab Facility: [facility]  Date of Admission: 6/14/23  Date of Discharge: 6/18/23  Reason(s) for Admission: abdominal pain    Was your hospitalization related to COVID-19? No   Problems taking medications regularly:  None  Medication changes since discharge: Miralax, stool sofner  Problems adhering to non-medication therapy:  None    Summary of hospitalization:  [facility] discharge summary reviewed  Diagnostic Tests/Treatments reviewed.  Follow up needed: none  Other Healthcare Providers Involved in Patient s Care:         None  Update since discharge: stable.         Plan of care communicated with patient           1. Constipation: States of abdominal pain.  Concerned about constipation.  Treated miralax and stool softener.  Patient was treated Miralax.  Had blood in stool at last day of discharge.  Had colonoscopy, per patient  "which was negative  As of today, patient continues to feel bloated.  Ongoing for the past month.  Denies any heartburn.  Feels full and hard.  Thyroid levels are normal.  States that bowel movement are once a day and soft in caliber.  Patient eats less.  Patient states that [place] breads make it worse. No fevers or chills.  No nausea or vomiting.  Denies any excessive alcohol.  Denies any blood in stool.     Review of Systems   Constitutional: Negative for chills and fever.   HENT: Negative for congestion, ear pain, hearing loss and sore throat.    Respiratory: Negative for cough and shortness of breath.    Cardiovascular: Negative for chest pain, palpitations and peripheral edema.   Gastrointestinal: Positive for abdominal distention and diarrhea.   Musculoskeletal: Negative for arthralgias, joint swelling and myalgias.   Skin: Negative for rash.   Neurological: Negative for dizziness, weakness, headaches and paresthesias.   Psychiatric/Behavioral: Negative for mood changes. The patient is not nervous/anxious.           Objective    /78   Pulse 61   Temp 97.3  F (36.3  C) (Tympanic)   Resp 14   Ht 1.684 m (5' 6.3\")   Wt 86.6 kg (191 lb)   SpO2 100%   BMI 30.55 kg/m    Body mass index is 30.55 kg/m .  Physical Exam  Constitutional:       General: He is not in acute distress.     Appearance: He is well-developed.   HENT:      Head: Normocephalic and atraumatic.      Nose: Nose normal.   Eyes:      Conjunctiva/sclera: Conjunctivae normal.   Neck:      Trachea: No tracheal deviation.   Cardiovascular:      Rate and Rhythm: Normal rate and regular rhythm.      Heart sounds: Normal heart sounds.   Pulmonary:      Effort: Pulmonary effort is normal.      Breath sounds: No wheezing.   Abdominal:      General: There is no distension.      Palpations: Abdomen is soft. There is no mass.      Tenderness: There is no abdominal tenderness. There is no guarding.   Musculoskeletal:         General: Normal range of " motion.      Cervical back: Normal range of motion.   Skin:     Findings: No erythema or rash.   Neurological:      Mental Status: He is alert and oriented to person, place, and time.   Psychiatric:         Behavior: Behavior normal.

## 2023-06-30 NOTE — PATIENT INSTRUCTIONS
Singh Elias,    Thank you for allowing [facility] to manage your care.    I ordered some blood work, please go to the laboratory to get your laboratory studies.    I sent your prescriptions to your pharmacy.    Please avoid foods or drinks which contain citrus (tomato, pineapple, lime, lemon, etc), caffeine, chocolate, and spicy foods.  Avoid eating late at night.     I made a gastroenterology referral, they will be calling in approximately 1 week to set up your appointment.  If you do not hear from them, please call the specialty number on your after visit.     For your convenience, test results are released as soon as they are available  Please allow 1-2 business days for me to send you a comment about your results.  If not done so, I encourage you to login into [employer] ([email or web address] to review your results in real time.     If you have any questions or concerns, please feel free to call us at [phone].    Sincerely,    Dr. Puentes    Did you know?      You can schedule a video visit for follow-up appointments as well as future appointments for certain conditions.  Please see the below link.     [email or web address]    If you have not already done so,  I encourage you to sign up for Hubspheret ([email or web address]).  This will allow you to review your results, securely communicate with a provider, and schedule virtual visits as well.

## 2023-07-03 ENCOUNTER — TELEPHONE (OUTPATIENT)
Dept: FAMILY MEDICINE | Facility: CLINIC | Age: 33
End: 2023-07-03
Payer: COMMERCIAL

## 2023-07-03 LAB
ALMOND IGE QN: 0.24 KU(A)/L
CASHEW NUT IGE QN: <0.1 KU(A)/L
CODFISH IGE QN: <0.1 KU(A)/L
COW MILK IGE QN: <0.1 KU(A)/L
EGG WHITE IGE QN: <0.1 KU(A)/L
HAZELNUT IGE QN: 5.02 KU(A)/L
IGE SERPL-ACNC: 574 KU/L (ref 0–114)
PEANUT IGE QN: 2.29 KU(A)/L
SALMON IGE QN: <0.1 KU(A)/L
SCALLOP IGE QN: 1.65 KU(A)/L
SESAME SEED IGE QN: 0.79 KU(A)/L
SHRIMP IGE QN: 0.42 KU(A)/L
SOYBEAN IGE QN: 0.37 KU(A)/L
TUNA IGE QN: <0.1 KU(A)/L
WALNUT IGE QN: 0.16 KU(A)/L
WHEAT IGE QN: 0.48 KU(A)/L

## 2023-07-03 NOTE — LETTER
July 7, 2023      Jada Lion   [address] [address]  [place] [address]        Dear ,    We are writing to inform you of your test results. We have tried to reach you by telephone and have been unsuccessful.     Recent allergy lab panel that you have food intolerance nuts (almond, hazelnut, peanut, sesame seeds, walnut), wheat, soybean, shrimp, and scallops. This most likely causing your abdominal symptoms. Please do a trial of avoiding these products and keep appointment with gastroenterology.     Resulted Orders   Allergy adult food panel   Result Value Ref Range    Immunoglobulin E 574 (H) 0 - 114 kU/L    [place], Food IgE 0.24 (H) <0.10 KU(A)/L      Comment:      Interpretation: Low    Cashew, Food IgE <0.10 <0.10 KU(A)/L      Comment:      Interpretation: None Detected    Codfish IgE <0.10 <0.10 KU(A)/L      Comment:      Interpretation: None Detected    Egg White IgE <0.10 <0.10 KU(A)/L      Comment:      Interpretation: None Detected    Hazelnut IgE 5.02 (H) <0.10 KU(A)/L      Comment:      Interpretation: High    Milk, Cow IgE <0.10 <0.10 KU(A)/L      Comment:      Interpretation: None Detected    Peanut IgE 2.29 (H) <0.10 KU(A)/L      Comment:      Interpretation: Moderate    [place] IgE <0.10 <0.10 KU(A)/L      Comment:      Interpretation: None Detected    Scallop IgE 1.65 (H) <0.10 KU(A)/L      Comment:      Interpretation: Moderate    Sesame Seed IgE 0.79 (H) <0.10 KU(A)/L      Comment:      Interpretation: Moderate    Shrimp IgE 0.42 (H) <0.10 KU(A)/L      Comment:      Interpretation: Low    Soybean IgE 0.37 (H) <0.10 KU(A)/L      Comment:      Interpretation: Low    Tuna IgE <0.10 <0.10 KU(A)/L      Comment:      Interpretation: None Detected    [place], Food IgE 0.16 (H) <0.10 KU(A)/L      Comment:      Interpretation: Low    Wheat IgE 0.48 (H) <0.10 KU(A)/L      Comment:      Interpretation: Low    Narrative    ImmunoCAP Specific IgE Blood Test Quantitative Scoring  <0.10 kU(A)/L         Absent/undetectable  0.10-0.69 kU(A)/L    Low  0.70-3.49 kU(A)/L    Moderate  3.50-17.50 kU(A)/L   High  >17.50 kU(A)/L      Very High  Please note: In general, low IgE antibody levels indicate a low probability of clinical disease, whereas high antibody levels to an allergen show good correlation with clinical disease.       If you have any questions or concerns, please call the clinic at the number listed above.       Sincerely,    Travis Puentes DO/rome

## 2023-07-03 NOTE — TELEPHONE ENCOUNTER
----- Message from Travis Puentes DO sent at 7/3/2023 12:21 PM CDT -----  Please call patient.  Recent allergy lab panel that he has food intolerance nuts (almond, hazelnut, peanut, sesame seeds, walnut), wheat, soybean, shrimp, and scallops.  This most likely causing his abdominal symptoms.  Please do a trial of avoiding these products and keep appointment with gastroenterology.

## 2023-07-03 NOTE — TELEPHONE ENCOUNTER
Attempted to call patient with number on file to relay provider's message below with no answer, unable to leave voicemail as pt's mail box is full.    Sonia Hicks, RN

## 2023-07-06 NOTE — TELEPHONE ENCOUNTER
"Does not appear the [employer] message sent today has yet been read.    Attempted to call patient at home/mobile number, no answer, again, \"mailbox is full and cannot accept messages at this time\" so unable to leave a message.    Nkechi Lau RN  [facility]        "

## 2023-07-06 NOTE — TELEPHONE ENCOUNTER
Called patient  Did they answer the phone: No, unable to leave a message, as the mailbox is full.    [employer] message sent to patient with below results.    Niki RN  Triage Nurse  [facility]: [facility]

## 2023-07-07 NOTE — TELEPHONE ENCOUNTER
Unable to get a hold of patient.    Please mail results and result notes to him.    Thank-you,    Niki RN BSN  Triage Nurse  [facility]: [facility]  Ph: [phone]    U

## 2023-07-21 ENCOUNTER — OFFICE VISIT (OUTPATIENT)
Dept: FAMILY MEDICINE | Facility: CLINIC | Age: 33
End: 2023-07-21
Payer: COMMERCIAL

## 2023-07-21 VITALS
TEMPERATURE: 97.9 F | BODY MASS INDEX: 30.34 KG/M2 | SYSTOLIC BLOOD PRESSURE: 126 MMHG | HEIGHT: 66 IN | OXYGEN SATURATION: 100 % | WEIGHT: 188.8 LBS | HEART RATE: 58 BPM | RESPIRATION RATE: 16 BRPM | DIASTOLIC BLOOD PRESSURE: 64 MMHG

## 2023-07-21 DIAGNOSIS — R10.12 LUQ ABDOMINAL PAIN: Primary | ICD-10-CM

## 2023-07-21 PROCEDURE — 99213 OFFICE O/P EST LOW 20 MIN: CPT | Performed by: PHYSICIAN ASSISTANT

## 2023-07-21 RX ORDER — DOCUSATE SODIUM 100 MG/1
100-200 CAPSULE, LIQUID FILLED ORAL 2 TIMES DAILY PRN
COMMUNITY
Start: 2023-07-21 | End: 2023-08-19

## 2023-07-21 RX ORDER — DOCUSATE SODIUM 100 MG/1
100 CAPSULE, LIQUID FILLED ORAL 3 TIMES DAILY PRN
Status: CANCELLED | OUTPATIENT
Start: 2023-07-21

## 2023-07-21 ASSESSMENT — PATIENT HEALTH QUESTIONNAIRE - PHQ9
SUM OF ALL RESPONSES TO PHQ QUESTIONS 1-9: 0
5. POOR APPETITE OR OVEREATING: NOT AT ALL

## 2023-07-21 ASSESSMENT — ANXIETY QUESTIONNAIRES
7. FEELING AFRAID AS IF SOMETHING AWFUL MIGHT HAPPEN: NOT AT ALL
6. BECOMING EASILY ANNOYED OR IRRITABLE: NOT AT ALL
5. BEING SO RESTLESS THAT IT IS HARD TO SIT STILL: NOT AT ALL
2. NOT BEING ABLE TO STOP OR CONTROL WORRYING: NOT AT ALL
GAD7 TOTAL SCORE: 0
1. FEELING NERVOUS, ANXIOUS, OR ON EDGE: NOT AT ALL
IF YOU CHECKED OFF ANY PROBLEMS ON THIS QUESTIONNAIRE, HOW DIFFICULT HAVE THESE PROBLEMS MADE IT FOR YOU TO DO YOUR WORK, TAKE CARE OF THINGS AT HOME, OR GET ALONG WITH OTHER PEOPLE: NOT DIFFICULT AT ALL
3. WORRYING TOO MUCH ABOUT DIFFERENT THINGS: NOT AT ALL
GAD7 TOTAL SCORE: 0

## 2023-07-21 ASSESSMENT — PAIN SCALES - GENERAL: PAINLEVEL: NO PAIN (0)

## 2023-07-21 NOTE — PROGRESS NOTES
"  Assessment & Plan   Problem List Items Addressed This Visit    None  Visit Diagnoses     LUQ abdominal pain    -  Primary         Obstipation and abdominal pain now resolved. Having daily BMs, which is less than his previous baseline. He is continuing to use prn docusate. Miralax has not helped his symptoms. He will continue to hydrate and use docusate prn.     Complete history and physical exam as below. Afebrile with normal vital signs.    DDx and Dx discussed with and explained to the pt to their satisfaction.  All questions were answered at this time. Pt expressed understanding of and agreement with this dx, tx, and plan. No further workup warranted and standard medication warnings given. I have given the patient a list of pertinent indications for re-evaluation. Will go to the Emergency Department if symptoms worsen or new concerning symptoms arise. Patient left in no apparent distress.     Nicotine/Tobacco Cessation:  He reports that he has been smoking cigarettes. He has been smoking an average of .2 packs per day. He has never used smokeless tobacco.    BMI:   Estimated body mass index is 30.16 kg/m  as calculated from the following:    Height as of this encounter: 1.685 m (5' 6.34\").    Weight as of this encounter: 85.6 kg (188 lb 12.8 oz).     See Patient Instructions    JAMEEL Friedman [facility] ADELA Elias is a 33 year old, presenting for the following health issues:  RECHECK        7/21/2023     7:03 AM   Additional Questions   Roomed by Alesha   Accompanied by n/a     Forms 7/21/2023   Any forms needing to be completed Yes     History of Present Illness       Reason for visit:  Stomache pain    He eats 0-1 servings of fruits and vegetables daily.He consumes 4 sweetened beverage(s) daily.He exercises with enough effort to increase his heart rate 60 or more minutes per day.  He exercises with enough effort to increase his heart rate 7 days per week.   He is " "taking medications regularly.     # BMs has decreased from 5-6xs a day to once daily since hospitalization for obstipation and abd pain. Now stools are more formed. Occasionally gets LUQ abd pain that passes. Has been without Miralax for the last week. Has been taking 100-300mg daily of docusate.     Pain History:  When did you first notice your pain? June 2023    Have you seen this provider for your pain in the past? No   Where in your body do your have pain? Abdominal pain, sharps pains, some bloating   Are you seeing anyone else for your pain? Yes - any provider that is available   What makes your pain better? Crouching over   What makes your pain worse? Heat   How has pain affected your ability to work? Seasonal work - does effect ability to work   Who lives in your household? Daughter, son and partner     Review of Systems   Constitutional, HEENT, cardiovascular, pulmonary, gi and gu systems are negative, except as otherwise noted.      Objective    /64   Pulse 58   Temp 97.9  F (36.6  C) (Temporal)   Resp 16   Ht 1.685 m (5' 6.34\")   Wt 85.6 kg (188 lb 12.8 oz)   SpO2 100%   BMI 30.16 kg/m    Body mass index is 30.16 kg/m .  Physical Exam  Vitals and nursing note reviewed.   Constitutional:       General: He is not in acute distress.     Appearance: He is not ill-appearing or diaphoretic.   HENT:      Head: Normocephalic and atraumatic.      Mouth/Throat:      Mouth: Mucous membranes are moist.   Eyes:      Conjunctiva/sclera: Conjunctivae normal.   Cardiovascular:      Rate and Rhythm: Normal rate and regular rhythm.      Heart sounds: Normal heart sounds. No murmur heard.     No friction rub. No gallop.   Pulmonary:      Effort: Pulmonary effort is normal. No respiratory distress.      Breath sounds: Normal breath sounds. No stridor. No wheezing, rhonchi or rales.   Abdominal:      General: Bowel sounds are normal. There is no distension.      Palpations: Abdomen is soft. There is no mass.      " Tenderness: There is no abdominal tenderness. There is no guarding or rebound.      Hernia: No hernia is present.   Skin:     General: Skin is warm and dry.   Neurological:      General: No focal deficit present.      Mental Status: He is alert. Mental status is at baseline.   Psychiatric:         Mood and Affect: Mood normal.         Behavior: Behavior normal.

## 2023-07-21 NOTE — PATIENT INSTRUCTIONS
Thmoas Elias,    Thank you for allowing [facility] to manage your care.    I am unsure of the cause of your symptoms, but your exam is reassuring. If you develop worsening/changing symptoms at any time, please call 911 or go to the emergency department for evaluation.    Continue the docusate as needed and directed for consistent bowel movements.    Drink 8-10 glasses of fluid daily to stay well-hydrated.    Take a probiotic pill, eat live culture yogurt (Greek yogurt or Activia), drink kefir daily to encourage growth of good bacteria in your system.    If you have any questions or concerns, please feel free to call us at [phone]    Sincerely,    Albin Parker PA-C    Did you know?      You can schedule a video visit for follow-up appointments as well as future appointments for certain conditions.  Please see the below link.     [email or web address]    If you have not already done so,  I encourage you to sign up for Soundtrackerhart ([email or web address]).  This will allow you to review your results, securely communicate with a provider, and schedule virtual visits as well.

## 2023-08-18 ENCOUNTER — OFFICE VISIT (OUTPATIENT)
Dept: FAMILY MEDICINE | Facility: CLINIC | Age: 33
End: 2023-08-18
Payer: COMMERCIAL

## 2023-08-18 ENCOUNTER — NURSE TRIAGE (OUTPATIENT)
Dept: FAMILY MEDICINE | Facility: CLINIC | Age: 33
End: 2023-08-18

## 2023-08-18 ENCOUNTER — ANCILLARY PROCEDURE (OUTPATIENT)
Dept: GENERAL RADIOLOGY | Facility: CLINIC | Age: 33
End: 2023-08-18
Attending: PHYSICIAN ASSISTANT
Payer: COMMERCIAL

## 2023-08-18 VITALS
WEIGHT: 195 LBS | BODY MASS INDEX: 31.34 KG/M2 | RESPIRATION RATE: 14 BRPM | SYSTOLIC BLOOD PRESSURE: 118 MMHG | DIASTOLIC BLOOD PRESSURE: 68 MMHG | HEIGHT: 66 IN | OXYGEN SATURATION: 99 % | HEART RATE: 70 BPM | TEMPERATURE: 98 F

## 2023-08-18 DIAGNOSIS — R07.9 CHEST PAIN, UNSPECIFIED TYPE: ICD-10-CM

## 2023-08-18 DIAGNOSIS — R07.9 CHEST PAIN, UNSPECIFIED TYPE: Primary | ICD-10-CM

## 2023-08-18 LAB
ERYTHROCYTE [DISTWIDTH] IN BLOOD BY AUTOMATED COUNT: 12.7 % (ref 10–15)
HCT VFR BLD AUTO: 42.3 % (ref 40–53)
HGB BLD-MCNC: 14.3 G/DL (ref 13.3–17.7)
MCH RBC QN AUTO: 30.9 PG (ref 26.5–33)
MCHC RBC AUTO-ENTMCNC: 33.8 G/DL (ref 31.5–36.5)
MCV RBC AUTO: 91 FL (ref 78–100)
PLATELET # BLD AUTO: 180 10E3/UL (ref 150–450)
RBC # BLD AUTO: 4.63 10E6/UL (ref 4.4–5.9)
WBC # BLD AUTO: 5.1 10E3/UL (ref 4–11)

## 2023-08-18 PROCEDURE — 93000 ELECTROCARDIOGRAM COMPLETE: CPT | Performed by: PHYSICIAN ASSISTANT

## 2023-08-18 PROCEDURE — 82550 ASSAY OF CK (CPK): CPT | Performed by: PHYSICIAN ASSISTANT

## 2023-08-18 PROCEDURE — 71046 X-RAY EXAM CHEST 2 VIEWS: CPT | Mod: TC | Performed by: RADIOLOGY

## 2023-08-18 PROCEDURE — 99214 OFFICE O/P EST MOD 30 MIN: CPT | Performed by: PHYSICIAN ASSISTANT

## 2023-08-18 PROCEDURE — 36415 COLL VENOUS BLD VENIPUNCTURE: CPT | Performed by: PHYSICIAN ASSISTANT

## 2023-08-18 PROCEDURE — 85027 COMPLETE CBC AUTOMATED: CPT | Performed by: PHYSICIAN ASSISTANT

## 2023-08-18 PROCEDURE — 80048 BASIC METABOLIC PNL TOTAL CA: CPT | Performed by: PHYSICIAN ASSISTANT

## 2023-08-18 ASSESSMENT — PAIN SCALES - GENERAL: PAINLEVEL: NO PAIN (0)

## 2023-08-18 NOTE — TELEPHONE ENCOUNTER
"Looks like patient has a video visit today with Manas Parker PA-C, and also an in person OV with Manas Parker PA-C on 8/25/23.        Patient scheduled for virtual visit today with Albin Parker PA-C for \"Tight chest pain, whole body was cramping, whole left side was numb, felt like someone was squeezing heart, heart rate was beating fast, went on for about 10 minutes on 8/15/23.     Patient has had not further symptoms or concerns since.  Scheduled for an in person OV today with Manas Parker PA-C at 1:40.  To ER if any of the red flag symptoms per protocol.    Patient stated understanding and agreeable with the plan of care.     Niki GONZALEZ BSN  Triage Nurse  [facility]        Reason for Disposition   Chest pain lasting longer than 5 minutes and occurred in last 3 days (72 hours) (Exception: feels exactly the same as previously diagnosed heartburn and has accompanying sour taste in mouth)     Chest pain happened on 8/15/23. Denies any symptoms since then.  Advised if this reoccurs to ER/call 911 now.  Appointment today with Manas Parker PA-C.    Additional Information   Negative: SEVERE difficulty breathing (e.g., struggling for each breath, speaks in single words)   Negative: Passed out (i.e., fainted, collapsed and was not responding)   Negative: Difficult to awaken or acting confused (e.g., disoriented, slurred speech)   Negative: Shock suspected (e.g., cold/pale/clammy skin, too weak to stand, low BP, rapid pulse)   Negative: Chest pain lasting longer than 5 minutes and ANY of the following:* Over 44 years old* Over 30 years old and at least one cardiac risk factor (e.g., diabetes mellitus, high blood pressure, high cholesterol, smoker, or strong family history of heart disease)* History of heart disease (i.e., angina, heart attack, heart failure, bypass surgery, takes nitroglycerin)* Pain is crushing, pressure-like, or heavy   Negative: Heart beating < 50 beats per minute OR > 140 " beats per minute     Chest pain happened on 8/15/23. Denies any symptoms since then.  Advised if this reoccurs to ER/call 911 now.   Negative: Visible sweat on face or sweat dripping down face     Chest pain happened on 8/15/23. Denies any symptoms since then.  Advised if this reoccurs to ER/call 911 now.   Negative: Sounds like a life-threatening emergency to the triager   Negative: Followed an injury to chest   Negative: SEVERE chest pain     Chest pain happened on 8/15/23. Denies any symptoms since then.  Advised if this reoccurs to ER/call 911 now.   Negative: Pain also in shoulder(s) or arm(s) or jaw     Chest pain happened on 8/15/23. Denies any symptoms since then.  Advised if this reoccurs to ER/call 911 now.   Negative: Difficulty breathing     Chest pain happened on 8/15/23. Denies any symptoms since then.  Advised if this reoccurs to ER/call 911 now.   Negative: Cocaine use within last 3 days   Negative: Major surgery in the past month   Negative: Hip or leg fracture (broken bone) in past month (or had cast on leg or ankle in past month)   Negative: Heart beating irregularly or very rapidly     Chest pain happened on 8/15/23. Denies any symptoms since then.  Advised if this reoccurs to ER/call 911 now.   Negative: Cancer treatment in the past two months (or has cancer now)   Negative: History of prior 'blood clot' in leg or lungs (i.e., deep vein thrombosis, pulmonary embolism)   Negative: History of inherited increased risk of blood clots (e.g., Factor 5 Leiden, Anti-thrombin 3, Protein C or Protein S deficiency, Prothrombin mutation)   Negative: Illness requiring prolonged bedrest in past month (e.g., immobilization, long hospital stay)   Negative: Long-distance travel in past month (e.g., car, bus, train, plane; with trip lasting 6 or more hours)   Negative: Chest pain or 'angina' comes and goes and is happening more often (increasing in frequency) or getting worse (increasing in severity) (Exception:  "chest pains that last only a few seconds)   Negative: Dizziness or lightheadedness   Negative: Coughing up blood   Negative: Patient sounds very sick or weak to the triager   Negative: Fever > 100.4 F (38.0 C)   Negative: Chest pain(s) lasting a few seconds persists > 3 days   Negative: Rash in same area as pain (may be described as 'small blisters')   Negative: All other patients with chest pain (Exception: fleeting chest pain lasting a few seconds)    Answer Assessment - Initial Assessment Questions  Patient scheduled for virtual visit today with Albin Parker PA-C for \"Tight chest pain, whole body was cramping, whole left side was numb, felt like someone was squeezing heart, heart rate was beating fast, went on for about 10 minutes on 8/15/23. \" Daria Tirado MA         Called patient.  This happened on Tuesday 8/15/23.  It lasted about 10-20 minutes while he was driving.  He has not had any symptoms since then.    Niki RASMUSSEN RN  Triage Nurse  [facility]    Protocols used: Chest Pain-A-[place]    "

## 2023-08-18 NOTE — TELEPHONE ENCOUNTER
"Please triage.    Patient scheduled for virtual visit today with Albin Parker PA-C for \"Tight chest pain, whole body was cramping, whole left side was numb, felt like someone was squeezing heart, heart rate was beating fast, went on for about 10 minutes on 8/15/23. \" Daria Tirado MA    "

## 2023-08-18 NOTE — PATIENT INSTRUCTIONS
Thomas Elias,    Thank you for allowing [facility] to manage your care.    I am unsure of the cause of your symptoms, but your exam is reassuring. We will see what our workup shows.     If you develop worsening/changing symptoms or change your mind about an emergency work-up to rule outcardiac causes of your symptoms at any time, please call 911 or go to the emergency department for evaluation as we discussed.    I ordered some lab work. Please go to the laboratory to get your studies.    I ordered some xrays. Please go to our radiology department to get your xrays.    Please allow 1-2 business days for our office to contact you in regards to your laboratory/radiological studies.  If not done so, I encourage you to login into [employer] ([email or web address] to review your results as well.     Drink 8-10 glasses of fluid daily to stay well-hydrated.    For your pain, please use Tylenol 650mg every 6 hours. You may use 400mg of ibuprofen between doses of Tylenol.     Max acetaminophen (Tylenol) 4,000mg/24 hours  Max ibuprofen 3,000mg/24 hours    If you have any questions or concerns, please feel free to call us at [phone]    Sincerely,    Albin Parker PA-C    Did you know?      You can schedule a video visit for follow-up appointments as well as future appointments for certain conditions.  Please see the below link.     [email or web address]    If you have not already done so,  I encourage you to sign up for [facility] ([email or web address]).  This will allow you to review your results, securely communicate with a provider, and schedule virtual visits as well.

## 2023-08-18 NOTE — PROGRESS NOTES
Assessment & Plan   Problem List Items Addressed This Visit    None  Visit Diagnoses       Chest pain, unspecified type    -  Primary    Relevant Orders    EKG 12-lead complete w/read - Clinics (Completed)    CBC with platelets    XR Chest 2 Views    CK total    Basic metabolic panel  (Ca, Cl, CO2, Creat, Gluc, K, Na, BUN)           Left-sided chest pain from unknown but likely nonemergent etiology.  This began after he took a preworkout, exercise for the first time in 1 month and was reaching across his body to put on his seatbelt.  It is worse with arm movements and palpation.  EKG was nonischemic and not significantly changed from previous tracing.  He had a normal exercise stress test 4 years ago.  We discussed the limitations of a clinic work-up and he declined transfer to the emergency department at this time.  I feel this is reasonable, however I did encourage him to go to the ER should his symptoms worsen/change or should he change his mind about an ER work-up.  We will obtain labs and imaging as above.  Low suspicion for PE as he is PERC rule negative and low risk for PE based on Wells criteria.  Unlikely dissection given story and exam.  Equal pulses in extremities.  He will use over-the-counter analgesics, hydration and follow-up with us in 1-2 weeks if not improving.    Complete history and physical exam as below. Afebrile with normal vital signs except for elevated bp, which they will monitor at home and contact us if >140/90mmHg on average.    DDx and Dx discussed with and explained to the pt to their satisfaction.  All questions were answered at this time. Pt expressed understanding of and agreement with this dx, tx, and plan. No further workup warranted and standard medication warnings given. I have given the patient a list of pertinent indications for re-evaluation. Will go to the Emergency Department if symptoms worsen or new concerning symptoms arise. Patient left in no apparent distress.  "    Ordering of each unique test  30 minutes spent by me on the date of the encounter doing chart review, history and exam, documentation and further activities per the note     BMI:   Estimated body mass index is 31.34 kg/m  as calculated from the following:    Height as of this encounter: 1.68 m (5' 6.14\").    Weight as of this encounter: 88.5 kg (195 lb).     See Patient Instructions    JAMEEL Friedman [facility] ADELA Elias is a 33 year old, presenting for the following health issues:  Chest Pain        8/18/2023     1:15 PM   Additional Questions   Roomed by elida reaves   Accompanied by none         8/18/2023     1:15 PM   Patient Reported Additional Medications   Patient reports taking the following new medications none       History of Present Illness       Reason for visit:  Chest pain  Symptom onset:  1-3 days ago  Symptoms include:  Tight chest pain  Symptom intensity:  Severe  Symptom progression:  Improving  Had these symptoms before:  Yes  Has tried/received treatment for these symptoms:  No  What makes it worse:  Heat  What makes it better:  Staying still    He eats 0-1 servings of fruits and vegetables daily.He consumes 2 sweetened beverage(s) daily.He exercises with enough effort to increase his heart rate 60 or more minutes per day.  He exercises with enough effort to increase his heart rate 7 days per week.   He is taking medications regularly.     Did 2 scoops of preworkout supplement at 4am for the first time in a month 3 days ago. Did a leg workout and roughly 12 hours later his Right hand was \"locking up\" and cramping. Sharp left sided chest pain when reaching across his body to put on seatbelt. Lasted 10 minutes. Movement of his arm made the pain worse. 1/10 chest pain now.  Nonpleuritic.  Nothing ripping or tearing and no numbness/tingling.    Smokes 10 cigarettes daily 5 days a week. no drugs. EtOH once a month. Taking 1600mg ibu     Review of Systems " "  Cardiovascular:  Positive for chest pain.      Constitutional, HEENT, cardiovascular, pulmonary, gi and gu systems are negative, except as otherwise noted.      Objective    /68   Pulse 70   Temp 98  F (36.7  C) (Temporal)   Resp 14   Ht 1.68 m (5' 6.14\")   Wt 88.5 kg (195 lb)   SpO2 99%   BMI 31.34 kg/m    Body mass index is 31.34 kg/m .  Physical Exam  Vitals and nursing note reviewed.   Constitutional:       General: He is not in acute distress.     Appearance: He is not ill-appearing or diaphoretic.   HENT:      Head: Normocephalic and atraumatic.      Mouth/Throat:      Mouth: Mucous membranes are moist.   Eyes:      Conjunctiva/sclera: Conjunctivae normal.   Cardiovascular:      Rate and Rhythm: Normal rate and regular rhythm.      Heart sounds: Normal heart sounds. No murmur heard.     No friction rub. No gallop.      Comments: 2+ symmetric radial/PT pulses. No LE edema or tenderness.  Pulmonary:      Effort: Pulmonary effort is normal. No respiratory distress.      Breath sounds: Normal breath sounds. No stridor. No wheezing, rhonchi or rales.   Chest:      Chest wall: Tenderness (left chest tenderness worsens pain symptoms. no overlying signs of trauma/infection) present.   Abdominal:      General: Bowel sounds are normal. There is no distension.      Palpations: Abdomen is soft. There is no mass.      Tenderness: There is no abdominal tenderness. There is no guarding or rebound.      Hernia: No hernia is present.   Skin:     General: Skin is warm and dry.   Neurological:      General: No focal deficit present.      Mental Status: He is alert. Mental status is at baseline.   Psychiatric:         Mood and Affect: Mood normal.         Behavior: Behavior normal.     EKG - Reviewed and interpreted by me Normal Sinus Rhythm, large Ts in V2 likely from benign early repolarization without elevation/large Ts in adjacent leads, normal axis, normal intervals, no acute ST/T changes c/w ischemia, no " significant cahnge from previous tracing      Labs pending

## 2023-08-19 ENCOUNTER — NURSE TRIAGE (OUTPATIENT)
Dept: NURSING | Facility: CLINIC | Age: 33
End: 2023-08-19
Payer: COMMERCIAL

## 2023-08-19 ENCOUNTER — TELEPHONE (OUTPATIENT)
Dept: FAMILY MEDICINE | Facility: CLINIC | Age: 33
End: 2023-08-19
Payer: COMMERCIAL

## 2023-08-19 LAB
ANION GAP SERPL CALCULATED.3IONS-SCNC: 12 MMOL/L (ref 7–15)
BUN SERPL-MCNC: 22.2 MG/DL (ref 6–20)
CALCIUM SERPL-MCNC: 9.5 MG/DL (ref 8.6–10)
CHLORIDE SERPL-SCNC: 105 MMOL/L (ref 98–107)
CK SERPL-CCNC: 1425 U/L (ref 39–308)
CREAT SERPL-MCNC: 0.93 MG/DL (ref 0.67–1.17)
DEPRECATED HCO3 PLAS-SCNC: 25 MMOL/L (ref 22–29)
GFR SERPL CREATININE-BSD FRML MDRD: >90 ML/MIN/1.73M2
GLUCOSE SERPL-MCNC: 102 MG/DL (ref 70–99)
POTASSIUM SERPL-SCNC: 4.3 MMOL/L (ref 3.4–5.3)
SODIUM SERPL-SCNC: 142 MMOL/L (ref 136–145)

## 2023-08-19 NOTE — TELEPHONE ENCOUNTER
Nurse Triage SBAR    Is this a 2nd Level Triage? YES, LICENSED PRACTITIONER REVIEW IS REQUIRED    Situation: Lab calling with critical result.    Background: Lab personnel calling with critical lab result - CK results = 1426. Paging to on call provider.    Assessment: Result communicated to on call provider, Dr. IAN Wayne.    Protocol Recommended Disposition:   Call PCP Now, See More Appropriate Guideline    Recommendation: Result reported to on call.     Paged to provider    Does the patient meet one of the following criteria for ADS visit consideration? 16+ years old, no PCP (internal or external) but seen at [facility]     TIP  Providers, please consider if this condition is appropriate for management at one of our Acute and Diagnostic Services sites.     If patient is a good candidate, please use dotphrase <dot>triageresponse and select Refer to ADS to document.     Reason for Disposition   Lab result questions   Lab or radiology calling with CRITICAL test results    Additional Information   Negative: Lab calling with strep throat test results and triager can call in prescription   Negative: Lab calling with urinalysis test results and triager can call in prescription   Negative: Medication questions   Negative: Medication renewal and refill questions   Negative: Pre-operative or pre-procedural questions   Negative: ED call to PCP (i.e., primary care provider; doctor, NP, or PA)   Negative: Doctor (or NP/PA) call to PCP   Negative: Call about patient who is currently hospitalized    Protocols used: Information Only Call - No Triage-A-, PCP Call - No Triage-A-    Bienvenido Núñez, RN, BSN, MSN  [facility] Triage 8:41 AM

## 2023-08-19 NOTE — TELEPHONE ENCOUNTER
On call note.  Elevated CK.  Called patient and still having chest pain - not better/not worse. Most likely from muscle but can't rule out heart. Needs troponin instead and advised to go to ER for more testing. Patient agreeable to plan. Abdelrahman Wayne MD

## 2023-08-19 NOTE — TELEPHONE ENCOUNTER
Critical lab results for [facility]. I advised the  that they need to go thru the answering service since it's not a [facility] patient. They will connect the lab with the answering service.  Jessica Engel RN  [facility]    Reason for Disposition   Health Information question, no triage required and triager able to answer question    Additional Information   Negative: [1] Caller is not with the adult (patient) AND [2] reporting urgent symptoms   Negative: Lab result questions   Negative: Medication questions   Negative: Caller can't be reached by phone   Negative: Caller has already spoken to PCP or another triager   Negative: RN needs further essential information from caller in order to complete triage   Negative: Requesting regular office appointment   Negative: [1] Caller requesting NON-URGENT health information AND [2] PCP's office is the best resource    Protocols used: Information Only Call - No Triage-A-

## 2023-08-20 ENCOUNTER — TELEPHONE (OUTPATIENT)
Dept: FAMILY MEDICINE | Facility: CLINIC | Age: 33
End: 2023-08-20
Payer: COMMERCIAL

## 2023-08-20 DIAGNOSIS — R74.8 ELEVATED CK: Primary | ICD-10-CM

## 2023-08-21 NOTE — TELEPHONE ENCOUNTER
Please contact patient and discuss whether he went to the ER as he was instructed for his chest pain. If he wants cardiac causes ruled out, he should be evaluated in the ER. I would like to recheck his CK and basic chem sometime this week. Labs ordered. Help with scheduling. He needs to drink 8-10 glasses of water daily to keep his kidneys healthy. Thanks.

## 2023-08-21 NOTE — TELEPHONE ENCOUNTER
Attempt #1    Left message for patient to call [facility] at [phone].       Lino Barakat RN

## 2023-08-22 NOTE — TELEPHONE ENCOUNTER
"I see 8/19/23 on call provider addressing elevated CK, patient was advised to be seen in ER.    I called and spoke to patient, he says he has been \"swamped\" at work and just has not been able to get to the ER.   He is planning to go \"first thing\" tomorrow AM.    He says he doesn't have much chest pain but does get short of breath and dizzy when he is exerting himself.   I advised he needs to go to ER now/tonight as it is possible he has damage to his heart muscle and that is why his body is not getting enough oxygen and causing him to  feel winded and dizzy.   Patient verbalized understanding of my concern and explanation of the worry about the elevated lab test but again states he plans to go to the ER in the AM.    I advised he watch for increasing symptoms such as nausea, left arm pain or tingling, feeling suddenly more dizzy or faint and if any of those happen, needs to call 911 or otherwise get to ER right away.      Routed to Albin Parker, see above, patient planning to go to ER tomorrow despite my warnings.   Assume ER will repeat labs as well as run troponin.    Nkechi NUGENT RN  [facility] Triage            "

## 2023-08-23 ENCOUNTER — APPOINTMENT (OUTPATIENT)
Dept: GENERAL RADIOLOGY | Facility: CLINIC | Age: 33
End: 2023-08-23
Attending: EMERGENCY MEDICINE
Payer: COMMERCIAL

## 2023-08-23 ENCOUNTER — HOSPITAL ENCOUNTER (EMERGENCY)
Facility: CLINIC | Age: 33
Discharge: HOME | End: 2023-08-23
Attending: EMERGENCY MEDICINE | Admitting: EMERGENCY MEDICINE
Payer: COMMERCIAL

## 2023-08-23 VITALS
DIASTOLIC BLOOD PRESSURE: 80 MMHG | HEART RATE: 53 BPM | TEMPERATURE: 97.6 F | BODY MASS INDEX: 31.34 KG/M2 | OXYGEN SATURATION: 99 % | HEIGHT: 66 IN | SYSTOLIC BLOOD PRESSURE: 125 MMHG | WEIGHT: 195 LBS | RESPIRATION RATE: 16 BRPM

## 2023-08-23 DIAGNOSIS — R07.9 ACUTE CHEST PAIN: ICD-10-CM

## 2023-08-23 DIAGNOSIS — M62.82 NON-TRAUMATIC RHABDOMYOLYSIS: ICD-10-CM

## 2023-08-23 LAB
ALBUMIN SERPL BCG-MCNC: 4.4 G/DL (ref 3.5–5.2)
ALBUMIN UR-MCNC: NEGATIVE MG/DL
ALP SERPL-CCNC: 62 U/L (ref 40–129)
ALT SERPL W P-5'-P-CCNC: 43 U/L (ref 0–70)
ANION GAP SERPL CALCULATED.3IONS-SCNC: 9 MMOL/L (ref 7–15)
APPEARANCE UR: CLEAR
AST SERPL W P-5'-P-CCNC: 29 U/L (ref 0–45)
BASOPHILS # BLD AUTO: 0.1 10E3/UL (ref 0–0.2)
BASOPHILS NFR BLD AUTO: 1 %
BILIRUB SERPL-MCNC: 0.4 MG/DL
BILIRUB UR QL STRIP: NEGATIVE
BUN SERPL-MCNC: 18 MG/DL (ref 6–20)
CALCIUM SERPL-MCNC: 9.6 MG/DL (ref 8.6–10)
CHLORIDE SERPL-SCNC: 103 MMOL/L (ref 98–107)
CK SERPL-CCNC: 537 U/L (ref 39–308)
COLOR UR AUTO: YELLOW
CREAT SERPL-MCNC: 0.91 MG/DL (ref 0.67–1.17)
DEPRECATED HCO3 PLAS-SCNC: 29 MMOL/L (ref 22–29)
EOSINOPHIL # BLD AUTO: 0.5 10E3/UL (ref 0–0.7)
EOSINOPHIL NFR BLD AUTO: 9 %
ERYTHROCYTE [DISTWIDTH] IN BLOOD BY AUTOMATED COUNT: 12.5 % (ref 10–15)
GFR SERPL CREATININE-BSD FRML MDRD: >90 ML/MIN/1.73M2
GLUCOSE SERPL-MCNC: 131 MG/DL (ref 70–99)
GLUCOSE UR STRIP-MCNC: NEGATIVE MG/DL
HCT VFR BLD AUTO: 43.4 % (ref 40–53)
HGB BLD-MCNC: 14.8 G/DL (ref 13.3–17.7)
HGB UR QL STRIP: NEGATIVE
HOLD SPECIMEN: NORMAL
IMM GRANULOCYTES # BLD: 0 10E3/UL
IMM GRANULOCYTES NFR BLD: 0 %
KETONES UR STRIP-MCNC: NEGATIVE MG/DL
LEUKOCYTE ESTERASE UR QL STRIP: NEGATIVE
LYMPHOCYTES # BLD AUTO: 1.8 10E3/UL (ref 0.8–5.3)
LYMPHOCYTES NFR BLD AUTO: 33 %
MCH RBC QN AUTO: 30.4 PG (ref 26.5–33)
MCHC RBC AUTO-ENTMCNC: 34.1 G/DL (ref 31.5–36.5)
MCV RBC AUTO: 89 FL (ref 78–100)
MONOCYTES # BLD AUTO: 0.3 10E3/UL (ref 0–1.3)
MONOCYTES NFR BLD AUTO: 6 %
NEUTROPHILS # BLD AUTO: 2.8 10E3/UL (ref 1.6–8.3)
NEUTROPHILS NFR BLD AUTO: 51 %
NITRATE UR QL: NEGATIVE
NRBC # BLD AUTO: 0 10E3/UL
NRBC BLD AUTO-RTO: 0 /100
PH UR STRIP: 7 [PH] (ref 5–7)
PLATELET # BLD AUTO: 197 10E3/UL (ref 150–450)
POTASSIUM SERPL-SCNC: 4 MMOL/L (ref 3.4–5.3)
PROT SERPL-MCNC: 7 G/DL (ref 6.4–8.3)
RBC # BLD AUTO: 4.87 10E6/UL (ref 4.4–5.9)
RBC URINE: 0 /HPF
SODIUM SERPL-SCNC: 141 MMOL/L (ref 136–145)
SP GR UR STRIP: 1.01 (ref 1–1.03)
SQUAMOUS EPITHELIAL: <1 /HPF
TROPONIN T SERPL HS-MCNC: 6 NG/L
UROBILINOGEN UR STRIP-MCNC: NORMAL MG/DL
WBC # BLD AUTO: 5.6 10E3/UL (ref 4–11)
WBC URINE: <1 /HPF

## 2023-08-23 PROCEDURE — 258N000003 HC RX IP 258 OP 636: Performed by: EMERGENCY MEDICINE

## 2023-08-23 PROCEDURE — 36415 COLL VENOUS BLD VENIPUNCTURE: CPT | Performed by: EMERGENCY MEDICINE

## 2023-08-23 PROCEDURE — 81001 URINALYSIS AUTO W/SCOPE: CPT | Performed by: EMERGENCY MEDICINE

## 2023-08-23 PROCEDURE — 96361 HYDRATE IV INFUSION ADD-ON: CPT

## 2023-08-23 PROCEDURE — 85004 AUTOMATED DIFF WBC COUNT: CPT | Performed by: EMERGENCY MEDICINE

## 2023-08-23 PROCEDURE — 82550 ASSAY OF CK (CPK): CPT | Performed by: EMERGENCY MEDICINE

## 2023-08-23 PROCEDURE — 99285 EMERGENCY DEPT VISIT HI MDM: CPT | Mod: 25

## 2023-08-23 PROCEDURE — 96360 HYDRATION IV INFUSION INIT: CPT

## 2023-08-23 PROCEDURE — 80053 COMPREHEN METABOLIC PANEL: CPT | Performed by: EMERGENCY MEDICINE

## 2023-08-23 PROCEDURE — 99284 EMERGENCY DEPT VISIT MOD MDM: CPT | Performed by: EMERGENCY MEDICINE

## 2023-08-23 PROCEDURE — 84484 ASSAY OF TROPONIN QUANT: CPT | Performed by: EMERGENCY MEDICINE

## 2023-08-23 PROCEDURE — 71046 X-RAY EXAM CHEST 2 VIEWS: CPT

## 2023-08-23 RX ADMIN — SODIUM CHLORIDE 1000 ML: 9 INJECTION, SOLUTION INTRAVENOUS at 11:38

## 2023-08-23 RX ADMIN — SODIUM CHLORIDE 1000 ML: 9 INJECTION, SOLUTION INTRAVENOUS at 09:48

## 2023-08-23 ASSESSMENT — ACTIVITIES OF DAILY LIVING (ADL)
ADLS_ACUITY_SCORE: 35
ADLS_ACUITY_SCORE: 35

## 2023-08-23 NOTE — ED PROVIDER NOTES
"  History     Chief Complaint   Patient presents with    Abnormal Labs     Pt reports he had an episode of chest pain last week to left side of chest that lasted about 15 minutes, pt reports pain was sharp. Pt reports he was seen by his PCP on Friday, had blood work done. Pt was called to come into ED for abnormal labs, pt states is was something that shows muscle damage.      HPI  History per patient, review of [facility] EMR and Care Everywhere EMR, including extensive chart review of multiple prior imaging studies (stress echo, chest x-rays), multiple clinic notes and multiple prior laboratory evaluations.     Jada Lion is a 33 year old male who presents emergency department 8 days after a 15-minute episode of left sided chest pain which occurred while driving, onset when he reached across his body over to the right with his left hand to buckle his seatbelt.  He had associated left hand cramping sensation and the right hand fingers cramped and flexed.  He had no associated shortness of breath, nausea or diaphoresis. Earlier that day, at ~ 4 AM, he ran 3 miles \"real hard\" and had no chest discomfort or other symptoms of ACS.  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 No history of exertional chest pains and he runs and exercises intermittently without chest pain or other symptoms of ACS.  No URI symptoms, cough, hemoptysis, leg pain or leg swelling.  He was seen in his primary care clinic 3 days later, 8/18/2023 and had an EKG which showed no acute changes and lab work performed.  He was called by clinic yesterday because his lab results returned abnormal.  He had a basic metabolic panel and CK drawn and the labs were remarkable for elevated CK of 1,425.  Clinic was unable to perform a Troponin test.  He also had a chest x-ray performed and that was negative/unremarkable.  He had an episode of atypical chest pain in 2019 " and had a stress echo which showed no stress-induced regional wall motion abnormalities and no EKG evidence of ischemia, however exercise was stopped at 73% MPHR due to exercise-induced asthma, thus study was deemed nondiagnostic.  No other pertinent history or acute complaints or concerns.    Previous Records Reviewed:  8/5/19 Stress Echo:  [facility]  Echocardiography Laboratory    Study Date: 08/05/2019  Reason For Study: Atypical chest pain, Abnormal electrocardiogram    Procedure:  Stress Echo Bike with two dimensional, color and spectral Doppler performed.    Interpretation Summary  Suboptimal stress test due to inadequate maximum heart rate. Normal resting LV  function with EF of approximately 60-65%; normal response to exercise with  increase to approximately 70-75%. No stress induced regional wall motion  abnormalities. No ECG evidence of ischemia. No significant valvular disease  noted on routine screening color flow Doppler and pulsed Doppler examination.  Normal functional capacity. No resting wallmotion abnormalities.  Exercise was stopped at 73% MPHR due to exercise induced Asthma.  Normal blood pressure response with stress.  Study non diagnostic for ischemia.  _____________________________________________________________________________  Stress  This was a normal stress EKG with no evidence of stress-induced ischemia.  Limiting Symptom: dyspnea.  Peak MVO2 28.1 ml/kg/min .  Percent predicted MVO2 82 %.  RPP 62122.  Maximum workload 175 dawn.  The patient exhibited dyspnea during exercise.  Normal blood pressure response with stress.  Exercise was stopped at 73% MPHR due to exercise induced Asthma.  Patient had to stop exercise due to worsening asthma with exercise.     Allergies:  Allergies   Allergen Reactions    Nuts     Other Food Allergy      scallops    Shrimp     Singulair [Montelukast]      Abdominal pain    Wheat Bran        Problem List:    Patient Active Problem  List    Diagnosis Date Noted    Acute left-sided low back pain without sciatica 03/05/2018     Priority: Medium    Cheilitis 08/23/2017     Priority: Medium    Dry eyes 08/23/2017     Priority: Medium    Rotator cuff injury, right, initial encounter 05/17/2017     Priority: Medium    Hip pain, left 07/21/2016     Priority: Medium    Tobacco use disorder 07/21/2016     Priority: Medium    Moderate persistent asthma without complication 04/08/2016     Priority: Medium    Overweight (BMI 25.0-29.9) 02/12/2015     Priority: Medium    CARDIOVASCULAR SCREENING; LDL GOAL LESS THAN 160 02/12/2015     Priority: Medium    Plantar fasciitis 08/29/2014     Priority: Medium    Moderate persistent asthma 08/13/2014     Priority: Medium    Seasonal allergic rhinitis 08/13/2014     Priority: Medium        Past Medical History:    Past Medical History:   Diagnosis Date    Obesity     Tobacco abuse     Uncomplicated asthma        Past Surgical History:    Past Surgical History:   Procedure Laterality Date    DENTAL SURGERY  1-2015    wisdom teeth        Family History:    Family History   Problem Relation Age of Onset    Diabetes Mother     Hypertension Mother     Asthma Mother     Other Cancer Maternal Grandfather     Other Cancer Paternal Grandfather         unsure if heart disease     Coronary Artery Disease No family hx of        Social History:  Marital Status:  Single [1]  Social History     Tobacco Use    Smoking status: Some Days     Packs/day: 0.20     Types: Cigarettes    Smokeless tobacco: Never    Tobacco comments:     Only smokes M-F during work days   Vaping Use    Vaping Use: Never used   Substance Use Topics    Alcohol use: Yes    Drug use: No        Medications:    albuterol (PROAIR HFA/PROVENTIL HFA/VENTOLIN HFA) 108 (90 Base) MCG/ACT inhaler  albuterol (PROVENTIL) (2.5 MG/3ML) 0.083% neb solution  fluticasone-salmeterol (ADVAIR DISKUS) 500-50 MCG/ACT inhaler  hydrocortisone 2.5 % cream  order for DME  triamcinolone  "(KENALOG) 0.5 % external ointment          Review of Systems  As mentioned in the HPI, in addition focused review of systems was negative.    Physical Exam   BP: (!) 155/77  Pulse: 67  Temp: 97.6  F (36.4  C)  Resp: 18  Height: 167.6 cm (5' 6\")  Weight: 88.5 kg (195 lb)  SpO2: 98 %      Physical Exam  Vitals and nursing note reviewed.   Constitutional:       General: He is not in acute distress.     Appearance: Normal appearance. He is well-developed. He is not ill-appearing or diaphoretic.   HENT:      Head: Normocephalic and atraumatic.      Right Ear: External ear normal.      Left Ear: External ear normal.      Nose: Nose normal.      Mouth/Throat:      Mouth: Mucous membranes are moist.   Eyes:      General: No scleral icterus.     Extraocular Movements: Extraocular movements intact.      Conjunctiva/sclera: Conjunctivae normal.   Neck:      Trachea: No tracheal deviation.   Cardiovascular:      Rate and Rhythm: Normal rate and regular rhythm.      Heart sounds: Normal heart sounds. No murmur heard.     No friction rub. No gallop.   Pulmonary:      Effort: Pulmonary effort is normal. No respiratory distress.      Breath sounds: Normal breath sounds. No wheezing, rhonchi or rales.   Abdominal:      Palpations: Abdomen is soft.   Musculoskeletal:         General: No swelling or tenderness. Normal range of motion.      Right lower leg: No edema.      Left lower leg: No edema.   Skin:     General: Skin is warm and dry.      Coloration: Skin is not pale.      Findings: No erythema or rash.   Neurological:      Mental Status: He is alert.   Psychiatric:         Mood and Affect: Mood normal.         Behavior: Behavior normal.         ED Course           Procedures                Results for orders placed or performed during the hospital encounter of 08/23/23 (from the past 24 hour(s))   [place] Draw    Narrative    The following orders were created for panel order [place] Draw.  Procedure                               " Abnormality         Status                     ---------                               -----------         ------                     Extra Blue Top Tube[[id number]]                              Final result               Extra Red Top Tube[[id number]]                                                          Extra Green Top (Lithium...[[id number]]                                                 Extra Purple Top Tube[[id number]]                                                         Please view results for these tests on the individual orders.   CBC with platelets differential    Narrative    The following orders were created for panel order CBC with platelets differential.  Procedure                               Abnormality         Status                     ---------                               -----------         ------                     CBC with platelets and d...[[id number]]                      Final result                 Please view results for these tests on the individual orders.   Comprehensive metabolic panel   Result Value Ref Range    Sodium 141 136 - 145 mmol/L    Potassium 4.0 3.4 - 5.3 mmol/L    Chloride 103 98 - 107 mmol/L    Carbon Dioxide (CO2) 29 22 - 29 mmol/L    Anion Gap 9 7 - 15 mmol/L    Urea Nitrogen 18.0 6.0 - 20.0 mg/dL    Creatinine 0.91 0.67 - 1.17 mg/dL    Calcium 9.6 8.6 - 10.0 mg/dL    Glucose 131 (H) 70 - 99 mg/dL    Alkaline Phosphatase 62 40 - 129 U/L    AST 29 0 - 45 U/L    ALT 43 0 - 70 U/L    Protein Total 7.0 6.4 - 8.3 g/dL    Albumin 4.4 3.5 - 5.2 g/dL    Bilirubin Total 0.4 <=1.2 mg/dL    GFR Estimate >90 >60 mL/min/1.73m2   CK total   Result Value Ref Range     (H) 39 - 308 U/L   Troponin T, High Sensitivity   Result Value Ref Range    Troponin T, High Sensitivity 6 <=22 ng/L   CBC with platelets and differential   Result Value Ref Range    WBC Count 5.6 4.0 - 11.0 10e3/uL    RBC Count 4.87 4.40 - 5.90 10e6/uL    Hemoglobin 14.8 13.3 - 17.7 g/dL    Hematocrit 43.4  40.0 - 53.0 %    MCV 89 78 - 100 fL    MCH 30.4 26.5 - 33.0 pg    MCHC 34.1 31.5 - 36.5 g/dL    RDW 12.5 10.0 - 15.0 %    Platelet Count 197 150 - 450 10e3/uL    % Neutrophils 51 %    % Lymphocytes 33 %    % Monocytes 6 %    % Eosinophils 9 %    % Basophils 1 %    % Immature Granulocytes 0 %    NRBCs per 100 WBC 0 <1 /100    Absolute Neutrophils 2.8 1.6 - 8.3 10e3/uL    Absolute Lymphocytes 1.8 0.8 - 5.3 10e3/uL    Absolute Monocytes 0.3 0.0 - 1.3 10e3/uL    Absolute Eosinophils 0.5 0.0 - 0.7 10e3/uL    Absolute Basophils 0.1 0.0 - 0.2 10e3/uL    Absolute Immature Granulocytes 0.0 <=0.4 10e3/uL    Absolute NRBCs 0.0 10e3/uL   Extra Blue Top Tube   Result Value Ref Range    Hold Specimen JIC    XR Chest 2 Views    Narrative    CHEST TWO VIEWS 8/23/2023 9:58 AM     HISTORY: Left-sided chest pain.    COMPARISON: Chest radiograph 8/18/2013.       Impression    IMPRESSION: No focal consolidation, pleural effusion or pneumothorax.  Cardiomediastinal silhouette is unremarkable.     CHATO SILVESTRE MD         SYSTEM ID:  [id number]   UA with Microscopic reflex to Culture    Specimen: Urine, Clean Catch   Result Value Ref Range    Color Urine Yellow Colorless, Straw, Light Yellow, Yellow    Appearance Urine Clear Clear    Glucose Urine Negative Negative mg/dL    Bilirubin Urine Negative Negative    Ketones Urine Negative Negative mg/dL    Specific Gravity Urine 1.013 1.003 - 1.035    Blood Urine Negative Negative    pH Urine 7.0 5.0 - 7.0    Protein Albumin Urine Negative Negative mg/dL    Urobilinogen Urine Normal Normal, 2.0 mg/dL    Nitrite Urine Negative Negative    Leukocyte Esterase Urine Negative Negative    RBC Urine 0 <=2 /HPF    WBC Urine <1 <=5 /HPF    Squamous Epithelials Urine <1 <=1 /HPF    Narrative    Urine Culture not indicated     I independently reviewed the X-rays: Agree with the Radiologist's interpretation.    Medications   0.9% sodium chloride BOLUS (0 mLs Intravenous Stopped 8/23/23 1137)   0.9%  sodium chloride BOLUS (0 mLs Intravenous Stopped 8/23/23 1406)     Consider CT chest imaging evaluation but deferred this as my clinical gestalt for PE/DVT, aneurysm/dissection or other emergent cardiopulmonary disease process is extremely low.    Assessments & Plan (with Medical Decision Making)   8 days s/p 15-minute episode of left sided chest pain which occurred while driving, onset when he reached across his body over to the right with his left hand to buckle his seatbelt. He was seen in his primary care clinic 3 days later, 8/18/2023, and had an EKG which showed no acute changes and lab work remarkable for elevated CK of 1,425.  Clinic was unable to perform a Troponin test.  He also had a chest x-ray performed and that was negative/unremarkable.  Today CK decreased to 537 today and creatinine, GFR and urinalysis were normal.  He was given a 2 L IV fluid bolus and appears stable and appropriate for discharge home with clinic follow-up with repeat CK level in 2 days.  I ordered a CK level for him.  He understands he needs to have this done in the results followed up on.  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  He will push fluids and avoid NSAIDs.  I doubt anginal chest pain or AMI.  He had an episode of atypical chest pain in 2019 and had a stress echo which showed no stress-induced regional wall motion abnormalities and no EKG evidence of ischemia, however exercise was stopped at 73% MPHR due to exercise-induced asthma, thus study was deemed nondiagnostic.  Doubt aneurysm/dissection, pericarditis, PE/DVT, PTX, pneumonia or emergent GI or hepatobiliary disease process as a cause of the pain.      I have reviewed the nursing notes.    I have reviewed the findings, diagnosis, plan and need for follow up with the patient.    Medical Decision Making: High complexity  Hospitalization for IV hydration and  serial CK monitoring was considered and deferred.  He currently appears stable and appropriate for outpatient management with close f/u.        New Prescriptions    No medications on file       Final diagnoses:   Non-traumatic rhabdomyolysis   Acute chest pain       8/23/2023   [facility] EMERGENCY DEPT       Fredis Boogie MD  08/23/23 [id number]

## 2023-08-23 NOTE — ED TRIAGE NOTES
Pt reports he had an episode of chest pain last week to left side of chest that lasted about 15 minutes, pt reports pain was sharp. Pt reports he was seen by his PCP on Friday, had blood work done. Pt was called to come into ED for abnormal labs, pt states is was something that shows muscle damage.   Pt denies chest pain at this time

## 2023-08-23 NOTE — LETTER
August 23, 2023      To Whom It May Concern:      Jada Lion was seen in our Emergency Department today, Wednesday 08/23/23.  I expect his condition to improve over the next several days.  Please excuse him from work to rest of this work week.  Sincerely,        SHANELLE Boogie MD

## 2023-08-29 ENCOUNTER — LAB (OUTPATIENT)
Dept: LAB | Facility: CLINIC | Age: 33
End: 2023-08-29
Payer: COMMERCIAL

## 2023-08-29 DIAGNOSIS — R74.8 ELEVATED CK: ICD-10-CM

## 2023-08-29 DIAGNOSIS — M62.82 NON-TRAUMATIC RHABDOMYOLYSIS: ICD-10-CM

## 2023-08-29 LAB — CK SERPL-CCNC: 552 U/L (ref 39–308)

## 2023-08-29 PROCEDURE — 82550 ASSAY OF CK (CPK): CPT

## 2023-08-29 PROCEDURE — 36415 COLL VENOUS BLD VENIPUNCTURE: CPT

## 2023-09-30 ENCOUNTER — HEALTH MAINTENANCE LETTER (OUTPATIENT)
Age: 33
End: 2023-09-30

## 2023-10-29 ENCOUNTER — HOSPITAL ENCOUNTER (EMERGENCY)
Facility: CLINIC | Age: 33
Discharge: HOME | End: 2023-10-29
Attending: EMERGENCY MEDICINE | Admitting: EMERGENCY MEDICINE
Payer: COMMERCIAL

## 2023-10-29 ENCOUNTER — APPOINTMENT (OUTPATIENT)
Dept: CT IMAGING | Facility: CLINIC | Age: 33
End: 2023-10-29
Attending: EMERGENCY MEDICINE
Payer: COMMERCIAL

## 2023-10-29 ENCOUNTER — APPOINTMENT (OUTPATIENT)
Dept: GENERAL RADIOLOGY | Facility: CLINIC | Age: 33
End: 2023-10-29
Attending: EMERGENCY MEDICINE
Payer: COMMERCIAL

## 2023-10-29 VITALS
BODY MASS INDEX: 32.95 KG/M2 | RESPIRATION RATE: 20 BRPM | WEIGHT: 205 LBS | HEIGHT: 66 IN | OXYGEN SATURATION: 94 % | TEMPERATURE: 98 F | HEART RATE: 57 BPM | SYSTOLIC BLOOD PRESSURE: 104 MMHG | DIASTOLIC BLOOD PRESSURE: 72 MMHG

## 2023-10-29 DIAGNOSIS — R93.7 ABNORMAL CT SCAN, LUMBAR SPINE: ICD-10-CM

## 2023-10-29 DIAGNOSIS — M54.50 LUMBAR BACK PAIN: ICD-10-CM

## 2023-10-29 LAB
ANION GAP SERPL CALCULATED.3IONS-SCNC: 7 MMOL/L (ref 7–15)
BUN SERPL-MCNC: 20.4 MG/DL (ref 6–20)
CALCIUM SERPL-MCNC: 9.2 MG/DL (ref 8.6–10)
CHLORIDE SERPL-SCNC: 106 MMOL/L (ref 98–107)
CREAT SERPL-MCNC: 1.09 MG/DL (ref 0.67–1.17)
DEPRECATED HCO3 PLAS-SCNC: 26 MMOL/L (ref 22–29)
EGFRCR SERPLBLD CKD-EPI 2021: >90 ML/MIN/1.73M2
GLUCOSE SERPL-MCNC: 111 MG/DL (ref 70–99)
POTASSIUM SERPL-SCNC: 4.3 MMOL/L (ref 3.4–5.3)
SODIUM SERPL-SCNC: 139 MMOL/L (ref 135–145)

## 2023-10-29 PROCEDURE — 72100 X-RAY EXAM L-S SPINE 2/3 VWS: CPT

## 2023-10-29 PROCEDURE — 250N000011 HC RX IP 250 OP 636: Mod: JZ | Performed by: EMERGENCY MEDICINE

## 2023-10-29 PROCEDURE — 99285 EMERGENCY DEPT VISIT HI MDM: CPT | Mod: 25 | Performed by: EMERGENCY MEDICINE

## 2023-10-29 PROCEDURE — 80048 BASIC METABOLIC PNL TOTAL CA: CPT | Performed by: EMERGENCY MEDICINE

## 2023-10-29 PROCEDURE — 72131 CT LUMBAR SPINE W/O DYE: CPT

## 2023-10-29 PROCEDURE — 96376 TX/PRO/DX INJ SAME DRUG ADON: CPT | Performed by: EMERGENCY MEDICINE

## 2023-10-29 PROCEDURE — 96374 THER/PROPH/DIAG INJ IV PUSH: CPT | Performed by: EMERGENCY MEDICINE

## 2023-10-29 PROCEDURE — 96375 TX/PRO/DX INJ NEW DRUG ADDON: CPT | Performed by: EMERGENCY MEDICINE

## 2023-10-29 PROCEDURE — 99284 EMERGENCY DEPT VISIT MOD MDM: CPT | Performed by: EMERGENCY MEDICINE

## 2023-10-29 PROCEDURE — 96361 HYDRATE IV INFUSION ADD-ON: CPT | Performed by: EMERGENCY MEDICINE

## 2023-10-29 PROCEDURE — 258N000003 HC RX IP 258 OP 636: Performed by: EMERGENCY MEDICINE

## 2023-10-29 PROCEDURE — 36415 COLL VENOUS BLD VENIPUNCTURE: CPT | Performed by: EMERGENCY MEDICINE

## 2023-10-29 RX ORDER — SODIUM CHLORIDE, SODIUM LACTATE, POTASSIUM CHLORIDE, CALCIUM CHLORIDE 600; 310; 30; 20 MG/100ML; MG/100ML; MG/100ML; MG/100ML
1000 INJECTION, SOLUTION INTRAVENOUS CONTINUOUS
Status: DISCONTINUED | OUTPATIENT
Start: 2023-10-29 | End: 2023-10-29 | Stop reason: HOSPADM

## 2023-10-29 RX ORDER — HYDROMORPHONE HYDROCHLORIDE 2 MG/1
2 TABLET ORAL EVERY 6 HOURS PRN
Qty: 10 TABLET | Refills: 0 | Status: SHIPPED | OUTPATIENT
Start: 2023-10-29 | End: 2023-11-01

## 2023-10-29 RX ORDER — PREDNISONE 20 MG/1
TABLET ORAL
Qty: 5 TABLET | Refills: 0 | Status: SHIPPED | OUTPATIENT
Start: 2023-10-29 | End: 2023-11-05

## 2023-10-29 RX ORDER — ONDANSETRON 2 MG/ML
4 INJECTION INTRAMUSCULAR; INTRAVENOUS
Status: COMPLETED | OUTPATIENT
Start: 2023-10-29 | End: 2023-10-29

## 2023-10-29 RX ORDER — HYDROMORPHONE HYDROCHLORIDE 1 MG/ML
0.5 INJECTION, SOLUTION INTRAMUSCULAR; INTRAVENOUS; SUBCUTANEOUS EVERY 30 MIN PRN
Status: DISCONTINUED | OUTPATIENT
Start: 2023-10-29 | End: 2023-10-29 | Stop reason: HOSPADM

## 2023-10-29 RX ORDER — DEXAMETHASONE SODIUM PHOSPHATE 10 MG/ML
10 INJECTION, SOLUTION INTRAMUSCULAR; INTRAVENOUS ONCE
Status: COMPLETED | OUTPATIENT
Start: 2023-10-29 | End: 2023-10-29

## 2023-10-29 RX ORDER — KETOROLAC TROMETHAMINE 15 MG/ML
10 INJECTION, SOLUTION INTRAMUSCULAR; INTRAVENOUS
Status: COMPLETED | OUTPATIENT
Start: 2023-10-29 | End: 2023-10-29

## 2023-10-29 RX ADMIN — SODIUM CHLORIDE, POTASSIUM CHLORIDE, SODIUM LACTATE AND CALCIUM CHLORIDE 1000 ML: 600; 310; 30; 20 INJECTION, SOLUTION INTRAVENOUS at 09:36

## 2023-10-29 RX ADMIN — KETOROLAC TROMETHAMINE 10 MG: 15 INJECTION, SOLUTION INTRAMUSCULAR; INTRAVENOUS at 09:34

## 2023-10-29 RX ADMIN — HYDROMORPHONE HYDROCHLORIDE 0.5 MG: 1 INJECTION, SOLUTION INTRAMUSCULAR; INTRAVENOUS; SUBCUTANEOUS at 09:40

## 2023-10-29 RX ADMIN — DEXAMETHASONE SODIUM PHOSPHATE 10 MG: 10 INJECTION, SOLUTION INTRAMUSCULAR; INTRAVENOUS at 09:39

## 2023-10-29 RX ADMIN — HYDROMORPHONE HYDROCHLORIDE 0.5 MG: 1 INJECTION, SOLUTION INTRAMUSCULAR; INTRAVENOUS; SUBCUTANEOUS at 11:52

## 2023-10-29 RX ADMIN — ONDANSETRON 4 MG: 2 INJECTION INTRAMUSCULAR; INTRAVENOUS at 09:33

## 2023-10-29 RX ADMIN — HYDROMORPHONE HYDROCHLORIDE 1 MG: 1 INJECTION, SOLUTION INTRAMUSCULAR; INTRAVENOUS; SUBCUTANEOUS at 12:40

## 2023-10-29 RX ADMIN — HYDROMORPHONE HYDROCHLORIDE 0.5 MG: 1 INJECTION, SOLUTION INTRAMUSCULAR; INTRAVENOUS; SUBCUTANEOUS at 10:47

## 2023-10-29 ASSESSMENT — ENCOUNTER SYMPTOMS
ENDOCRINE NEGATIVE: 1
HEMATOLOGIC/LYMPHATIC NEGATIVE: 1
ALLERGIC/IMMUNOLOGIC NEGATIVE: 1
NEUROLOGICAL NEGATIVE: 1
PSYCHIATRIC NEGATIVE: 1
CARDIOVASCULAR NEGATIVE: 1
CONSTITUTIONAL NEGATIVE: 1
EYES NEGATIVE: 1
RESPIRATORY NEGATIVE: 1
BACK PAIN: 1
GASTROINTESTINAL NEGATIVE: 1

## 2023-10-29 ASSESSMENT — ACTIVITIES OF DAILY LIVING (ADL)
ADLS_ACUITY_SCORE: 35

## 2023-10-29 NOTE — ED TRIAGE NOTES
Pt here with lower back pain that started after playing volleyball on Friday. Pt said he jumped landed normal but felt something was in his back it was burning. Pt went home and after he layed down he couldn't walk. Pt has been crawling and scooting but not walking since Friday night. Denies numbness and or tingling. No loss of bowel or bladder. No pain shooting down his legs. Pt has been taking hydrocodone from an old prescription last dose 11pm. Pt crawled and scooted into the bed from the wheelchair. Pt has to lay on his stomach or side he cannot lay on his back.      Triage Assessment (Adult)       Row Name 10/29/23 0853          Triage Assessment    Airway WDL WDL        Respiratory WDL    Respiratory WDL WDL        Cardiac WDL    Cardiac WDL WDL        Peripheral/Neurovascular WDL    Peripheral Neurovascular WDL WDL        Cognitive/Neuro/Behavioral WDL    Cognitive/Neuro/Behavioral WDL WDL

## 2023-10-29 NOTE — LETTER
October 29, 2023      To Whom It May Concern:      Jada Lion was seen in our Emergency Department today, 10/29/23.  Please excuse him from missing work due to symptoms and evaluation in the emergency department.  Further follow-up care may be beneficial if symptoms persist.  This work note is valid until 11/3/2023.      Sincerely,             Edwar Mina MD

## 2023-10-29 NOTE — ED PROVIDER NOTES
History     Chief Complaint   Patient presents with    Back Pain     Lower back, left leg, unable to walk     HPI  Jada Lion is a 33 year old male who presents with report of lower back pain rating down the left leg and inability to walk.  On intake patient reported his symptoms began 3 days earlier while playing volleyball.  Reports landing and developed a burning sensation.  He has been crawling around at home since this injury.  He is only comfortable when laying prone and cannot lay supine due to pain and discomfort    Medical records show history of moderate persistent asthma, seasonal allergic rhinitis, history of hip pain tobacco use disorder and rotator cuff injury.    Patient's prescribed medications were reviewed.    On examination patient arrived by car with his partner Katie from home in [place].  He confirmed that he was playing volleyball 2 days prior when he landed after a spike onto his left leg he developed sudden burning pain in the lumbar spine and left hip area.  He stopped playing and since then he has had burning pain and discomfort.  No prior history of back surgery or injury no prior history of chiropractic care.  He has been taking 2 tablets of hydrocodone every 4-6 hours with minimal relief.  He is also on Singulair and an inhaler.  He reports no he difficulty urinating or defecating no perianal numbness and no tingling numbness or leg weakness due to lumbar back pain he presents for further care.    Allergies:  Allergies   Allergen Reactions    Nuts     Other Food Allergy      scallops    Shrimp     Singulair [Montelukast]      Abdominal pain    Wheat Bran        Problem List:    Patient Active Problem List    Diagnosis Date Noted    Acute left-sided low back pain without sciatica 03/05/2018     Priority: Medium    Cheilitis 08/23/2017     Priority: Medium    Dry eyes 08/23/2017     Priority: Medium    Rotator cuff injury, right, initial encounter 05/17/2017     Priority:  Medium    Hip pain, left 07/21/2016     Priority: Medium    Tobacco use disorder 07/21/2016     Priority: Medium    Moderate persistent asthma without complication 04/08/2016     Priority: Medium    Overweight (BMI 25.0-29.9) 02/12/2015     Priority: Medium    CARDIOVASCULAR SCREENING; LDL GOAL LESS THAN 160 02/12/2015     Priority: Medium    Plantar fasciitis 08/29/2014     Priority: Medium    Moderate persistent asthma 08/13/2014     Priority: Medium    Seasonal allergic rhinitis 08/13/2014     Priority: Medium        Past Medical History:    Past Medical History:   Diagnosis Date    Obesity     Tobacco abuse     Uncomplicated asthma        Past Surgical History:    Past Surgical History:   Procedure Laterality Date    DENTAL SURGERY  1-2015    wisdom teeth        Family History:    Family History   Problem Relation Age of Onset    Diabetes Mother     Hypertension Mother     Asthma Mother     Other Cancer Maternal Grandfather     Other Cancer Paternal Grandfather         unsure if heart disease     Coronary Artery Disease No family hx of        Social History:  Marital Status:  Single [1]  Social History     Tobacco Use    Smoking status: Some Days     Packs/day: .2     Types: Cigarettes    Smokeless tobacco: Never    Tobacco comments:     Only smokes M-F during work days   Vaping Use    Vaping Use: Never used   Substance Use Topics    Alcohol use: Yes    Drug use: No        Medications:    HYDROmorphone (DILAUDID) 2 MG tablet  predniSONE (DELTASONE) 20 MG tablet  albuterol (PROAIR HFA/PROVENTIL HFA/VENTOLIN HFA) 108 (90 Base) MCG/ACT inhaler  albuterol (PROVENTIL) (2.5 MG/3ML) 0.083% neb solution  fluticasone-salmeterol (ADVAIR DISKUS) 500-50 MCG/ACT inhaler  hydrocortisone 2.5 % cream  order for DME  triamcinolone (KENALOG) 0.5 % external ointment          Review of Systems   Constitutional: Negative.    HENT: Negative.     Eyes: Negative.    Respiratory: Negative.     Cardiovascular: Negative.   "  Gastrointestinal: Negative.    Endocrine: Negative.    Genitourinary: Negative.    Musculoskeletal:  Positive for back pain.   Skin: Negative.    Allergic/Immunologic: Negative.    Neurological: Negative.    Hematological: Negative.    Psychiatric/Behavioral: Negative.     All other systems reviewed and are negative.      Physical Exam   BP: 123/68  Pulse: 68  Temp: 98  F (36.7  C)  Resp: 20  Height: 167.6 cm (5' 6\")  Weight: 93 kg (205 lb)  SpO2: 99 %      Physical Exam  Constitutional:       General: He is not in acute distress.     Appearance: Normal appearance. He is not ill-appearing, toxic-appearing or diaphoretic.   HENT:      Head: Normocephalic and atraumatic.      Nose: Nose normal.   Eyes:      Extraocular Movements: Extraocular movements intact.      Pupils: Pupils are equal, round, and reactive to light.   Cardiovascular:      Rate and Rhythm: Normal rate and regular rhythm.   Pulmonary:      Effort: Pulmonary effort is normal.      Breath sounds: Normal breath sounds.   Musculoskeletal:         General: Tenderness and signs of injury present.      Cervical back: Normal range of motion and neck supple.        Back:    Skin:     Capillary Refill: Capillary refill takes less than 2 seconds.      Coloration: Skin is not jaundiced or pale.      Findings: No bruising, erythema or lesion.   Neurological:      General: No focal deficit present.      Mental Status: He is alert and oriented to person, place, and time.      Cranial Nerves: No cranial nerve deficit.      Sensory: No sensory deficit.      Motor: No weakness.      Coordination: Coordination normal.      Gait: Gait normal.      Deep Tendon Reflexes: Reflexes normal.   Psychiatric:         Mood and Affect: Mood normal.         Behavior: Behavior normal.         Thought Content: Thought content normal.         Judgment: Judgment normal.         ED Course                 Procedures              Critical Care time:  none               ED " medications:  Medications   lactated ringers BOLUS 1,000 mL (0 mLs Intravenous Stopped 10/29/23 1153)   ondansetron (ZOFRAN) injection 4 mg (4 mg Intravenous $Given 10/29/23 0933)   ketorolac (TORADOL) injection 10 mg (10 mg Intravenous $Given 10/29/23 0934)   dexAMETHasone PF (DECADRON) injection 10 mg (10 mg Intravenous $Given 10/29/23 0939)   HYDROmorphone (DILAUDID) injection 1 mg (1 mg Intravenous $Given 10/29/23 1240)     ED Vitals;  Vitals:    10/29/23 1024 10/29/23 1045 10/29/23 1100 10/29/23 1245   BP: 112/60 113/66  104/72   Pulse: 55 57     Resp:       Temp:       TempSrc:       SpO2: 97%  98% 94%   Weight:       Height:         ED labs and imaging:  Results for orders placed or performed during the hospital encounter of 10/29/23   Lumbar spine XR, 2-3 views     Status: None    Narrative    EXAM: XR LUMBAR SPINE 2/3 VIEWS  LOCATION: [facility]  DATE: 10/29/2023    INDICATION: Back pain after landing while playing volleyball. Evaluate for compression fracture vs other acute bony process after blunt trauma.  COMPARISON: None.      Impression    IMPRESSION: There is a small osseous fragment posterior to the L2 pedicle, which appears somewhat well corticated, although an acute fracture is not excluded. CT can be performed for further evaluation. Normal vertebral heights and alignment. Disc spaces   are preserved. Minimal osteophyte production throughout the lower thoracic and upper lumbar vertebral bodies. Minimal multilevel facet arthropathy.     Lumbar spine CT w/o contrast     Status: None    Narrative    EXAM: CT LUMBAR SPINE W/O CONTRAST  LOCATION: [facility]  DATE: 10/29/2023    INDICATION: Low back pain.  Axial loading type injury while playing volleyball 2 day prior. Abnormal XR (L2 pedicle) CT advised. Followup imaging for further detail.  COMPARISON: Lumbar spine radiograph 10/29/2023  TECHNIQUE: Routine CT Lumbar Spine without IV contrast.  Multiplanar reformats. Dose reduction techniques were used.     FINDINGS:  VERTEBRA: Normal vertebral body heights and alignment. No fracture or posttraumatic subluxation.     CANAL/FORAMINA: At L4-L5, there is a disc bulge with a central disc protrusion. This results in moderate spinal canal stenosis.    PARASPINAL: No extraspinal abnormality.      Impression    IMPRESSION:  1.  There is no acute fracture. Previously described osseous fragment adjacent to the right L1-L2 facet is consistent with an osteophyte.    2.  At L4-L5, there is a central disc protrusion resulting in moderate spinal canal stenosis.   Basic metabolic panel     Status: Abnormal   Result Value Ref Range    Sodium 139 135 - 145 mmol/L    Potassium 4.3 3.4 - 5.3 mmol/L    Chloride 106 98 - 107 mmol/L    Carbon Dioxide (CO2) 26 22 - 29 mmol/L    Anion Gap 7 7 - 15 mmol/L    Urea Nitrogen 20.4 (H) 6.0 - 20.0 mg/dL    Creatinine 1.09 0.67 - 1.17 mg/dL    GFR Estimate >90 >60 mL/min/1.73m2    Calcium 9.2 8.6 - 10.0 mg/dL    Glucose 111 (H) 70 - 99 mg/dL     Assessments & Plan (with Medical Decision Making)   Assessment Summary and Clinical Impression: 33-year-old male who presented with lumbar back pain over the last 2 days while playing volleyball.  He reports landing on his left leg and ultimately developing burning discomfort around the lumbar area and left buttock with worsening pain with ambulation.  Pain is improved with laying prone but worse when lying supine.  He was treated for nontraumatic rhabdomyolysis 2 months earlier.  On arrival on examination patient was hemodynamically normal and afebrile.  He was laying prone on the gurney on examination and had some lumbar tenderness but no step-offs.  No pain with palpation at the sciatic notch.  No pain with hip flexion and knee flexion and no pain with internal/external rotation about the left hip.  Pelvis is stable.  No midline cervical or thoracic spine tenderness.  He also reported no red  flag specifically-no paresthesias, no perianal numbness or extremity weakness.  No previous back injury or surgery or recent interventions including chiropractic care.  He was offered medications to manage his pain and discomfort. Imaging obtained in a stepwise fashion.  CT revealing L4-L5 disc bulge with central disc protrusion and moderate spinal canal stenosis after period of care pain was modestly controlled and patient expressed comfort going home with.  Outpatient follow-up.  Orthopedic  referral placed to help with follow-up assessment and a pain management plan reviewed.  Low threshold to return if there are new concerns.    ED course and plan:  Reviewed the medical record.  Reviewed ED visit on 8/23/2023.  With back pain after landing on feet while playing volleyball we discussed possible causes for his pain.  He was offered medications for his pain and discomfort and stepwise imaging undertaken given his age and symptoms with axial type loading.   X-ray of the lumbar spine was reviewed independently and the radiology report  also reviewed.  Radiology advised CT for follow-up evaluation given a small osseous fragment posterior to the L2 pedicle and an acute fracture excluded.  CT lumbar spine was obtained.  CT lumbar spine revealed no acute fracture.  X-ray findings were confirmed to be osteophytes.  There is central disc protrusion at L4-L5 with moderate spinal canal stenosis.  See details outlined the radiology report.    Patient was reevaluated after period of care and completion of x-ray and CT imaging.  Pain was marginally controlled.  Pain was much improved and patient expressed comfort going home.  He ambulated during his ED course I placed a referral through the orthopedic  service for follow-up assessment discussion about the role of epidural steroid injection.  He was discharged with Dilaudid.  For additional breakthrough pain management in addition to over-the-counter  medications and prednisone taper we discussed and reviewed concerning symptoms including reasons to return for evaluation.  Patient was given a work note.      Disclaimer: This note consists of symbols derived from keyboarding, dictation and/or voice recognition software. As a result, there may be errors in the script that have gone undetected. Please consider this when interpreting information found in this chart.   I have reviewed the nursing notes.    I have reviewed the findings, diagnosis, plan and need for follow up with the patient.           Medical Decision Making  The patient's presentation was of moderate complexity (an acute complicated injury).    The patient's evaluation involved:  ordering and/or review of 2 test(s) in this encounter (diagnostic labs and imaging)    The patient's management necessitated moderate risk (prescription drug management including medications given in the ED).        Discharge Medication List as of 10/29/2023  2:59 PM        START taking these medications    Details   HYDROmorphone (DILAUDID) 2 MG tablet Take 1 tablet (2 mg) by mouth every 6 hours as needed for pain, Disp-10 tablet, R-0, E-Prescribe      predniSONE (DELTASONE) 20 MG tablet 1 tab daily for 3 days, then 1/2 tab daily for 4 days, Disp-5 tablet, R-0, E-Prescribe             Final diagnoses:   Lumbar back pain   Abnormal CT scan, lumbar spine - At L4-L5, there is a central disc protrusion resulting in moderate spinal canal stenosis.       10/29/2023   [facility] EMERGENCY DEPT       Derick Mina MD  10/29/23 [id number]

## 2023-10-29 NOTE — DISCHARGE INSTRUCTIONS
1) Your evaluation reviewed that your back pain is likely coming from disc protrusion at L4-L5 resulting in moderate canal stenosis appreciated on CT imaging.  There is no evidence of compression fracture on x-ray or CT.    2) after period of care with medications given during your visit you reported some improvement in your back discomfort.  For home I recommend you take Tylenol 1000 mg every 8 hours, he also prescribed pain medication Dilaudid to use every 4 hours if needed.  You given steroids to see if this will help your pain and discomfort.  Have placed a referral to the orthopedic  service to help with discussion about epidural steroid injections for back pain management.  We discussed care at work pending follow-up

## 2023-10-29 NOTE — ED NOTES
Assumed care of pt report received.  Pt laying prone on cot.  Pt was able to roll over to supine, 20g IV started, labs drawn and pt medicated per MD orders. Pt ready for radiology, and explained rational.     PLAN:  Will await test results and further orders.

## 2023-10-30 NOTE — TELEPHONE ENCOUNTER
DIAGNOSIS: abnormal CT scan, lumbar spine/Dr. Bal/[facility]/ortho con/DOI 10/27/sports inj from volleyball    APPOINTMENT DATE: 10/31/23   NOTES STATUS DETAILS   DISCHARGE REPORT from the ER Internal 10/29/23 - Derick Mina MD - [facility] Emergency Dept   MEDICATION LIST Internal    CT SCAN Internal CT LUMBAR SPINE:  - 10/29/23   XRAYS  & INJECTIONS (IMAGES & REPORTS) Internal XR LUMBAR SPINE:  - 10/29/23

## 2023-10-31 ENCOUNTER — OFFICE VISIT (OUTPATIENT)
Dept: ORTHOPEDICS | Facility: CLINIC | Age: 33
End: 2023-10-31
Payer: COMMERCIAL

## 2023-10-31 ENCOUNTER — PRE VISIT (OUTPATIENT)
Dept: ORTHOPEDICS | Facility: CLINIC | Age: 33
End: 2023-10-31

## 2023-10-31 DIAGNOSIS — R93.7 ABNORMAL CT SCAN, LUMBAR SPINE: ICD-10-CM

## 2023-10-31 DIAGNOSIS — M53.3 PAIN OF LEFT SACROILIAC JOINT: Primary | ICD-10-CM

## 2023-10-31 PROCEDURE — 99204 OFFICE O/P NEW MOD 45 MIN: CPT | Performed by: FAMILY MEDICINE

## 2023-10-31 RX ORDER — MELOXICAM 15 MG/1
TABLET ORAL
Qty: 30 TABLET | Refills: 1 | Status: SHIPPED | OUTPATIENT
Start: 2023-10-31 | End: 2024-07-15

## 2023-10-31 NOTE — PROGRESS NOTES
"CHIEF COMPLAINT:  Pain of the Spine       HISTORY OF PRESENT ILLNESS  Mr. Lion is a pleasant 33 year old male who presents to clinic today with back and leg pain.      Jada reports to clinic today for an evaluation of lumbar back pain that radiates into his left buttock. He notes that he was playing indoor volleyball which he does often, came down from a spike and landed onto his left leg. As he landed he experienced a \"jamming sensation\" in his left hip and a \"pop\" in his lower back. Since he has noticed lumbar back pain that worsens with standing, sitting, forward flexion. He notes no numbness, tingling, leg weakness, loss of bowel or bladder control. Lying prone takes away his back as well as lumbar extension. He notes no previous hx of back issues. Treatment thus far has consisted of hydromorphone, ice, heat, and chiropractic care.        Additional history: as documented    MEDICAL HISTORY  Patient Active Problem List   Diagnosis    Moderate persistent asthma    Seasonal allergic rhinitis    Plantar fasciitis    Overweight (BMI 25.0-29.9)    CARDIOVASCULAR SCREENING; LDL GOAL LESS THAN 160    Moderate persistent asthma without complication    Hip pain, left    Tobacco use disorder    Rotator cuff injury, right, initial encounter    Cheilitis    Dry eyes    Acute left-sided low back pain without sciatica       Current Outpatient Medications   Medication Sig Dispense Refill    albuterol (PROAIR HFA/PROVENTIL HFA/VENTOLIN HFA) 108 (90 Base) MCG/ACT inhaler Inhale 1-2 puffs into the lungs every 4 hours as needed for shortness of breath or wheezing 18 g 1    albuterol (PROVENTIL) (2.5 MG/3ML) 0.083% neb solution Qid prn 225 mL 0    fluticasone-salmeterol (ADVAIR DISKUS) 500-50 MCG/ACT inhaler Inhale 1 puff into the lungs 2 times daily 1 each 11    hydrocortisone 2.5 % cream Apply BID to affected region(s) for 7-10 days. 30 g 0    HYDROmorphone (DILAUDID) 2 MG tablet Take 1 tablet (2 mg) by mouth every 6 hours as " needed for pain 10 tablet 0    order for DME Equipment being ordered: Nebulizer, tubing and mask 1 Device 0    predniSONE (DELTASONE) 20 MG tablet 1 tab daily for 3 days, then 1/2 tab daily for 4 days 5 tablet 0    triamcinolone (KENALOG) 0.5 % external ointment Apply to affected areas 2 times a day for a maximum of 2 weeks 45 g 0       Allergies   Allergen Reactions    Nuts     Other Food Allergy      scallops    Shrimp     Singulair [Montelukast]      Abdominal pain    Wheat Bran        Family History   Problem Relation Age of Onset    Diabetes Mother     Hypertension Mother     Asthma Mother     Other Cancer Maternal Grandfather     Other Cancer Paternal Grandfather         unsure if heart disease     Coronary Artery Disease No family hx of        Additional medical/Social/Surgical histories reviewed in EPIC and updated as appropriate.        PHYSICAL EXAM  General  - normal appearance, in no obvious distress    Musculoskeletal - lumbar spine  - stance: slow to rise and sit  - inspection: normal bone and joint alignment, no obvious scoliosis  - palpation: left posterior hip tenderness  - ROM: pain with bilateral rotation, flexion past 30 deg, extension  - strength: lower extremities 5/5 in all planes  - special tests:  (-) slump test bilaterally  Neuro  - patellar and Achilles DTRs 2+ bilaterally, no sensory or motor deficit, grossly normal coordination, normal muscle tone             ASSESSMENT & PLAN  Mr. Lion is a 33 year old male who presents to clinic today with acute left-sided low back and posterior hip pain.    I reviewed his imaging in the room with him, while he does have an L4-5 disc bulge is difficult to tell exactly how much symptoms this is causing acutely, compared to a likely left SI joint contusion.  He is nearing the end of his prednisone taper, I am prescribing him meloxicam to start when his prednisone is finished.  I am also referring him to physical therapy.    If his symptoms are not  improving whatsoever over the course of the next 4 to 6 weeks I would likely recommend either an intra-articular SI joint injection or imaging of his SI joint and hip directly.    It was a pleasure seeing Jada today.    Armando Reina DO, [facility]  Primary Care Sports Medicine      This note was constructed using Dragon dictation software, please excuse any minor errors in spelling, grammar, or syntax.

## 2023-10-31 NOTE — LETTER
"  10/31/2023      RE: Jada Lion  [address] [id number]  [place] [address]     Dear Colleague,    Thank you for referring your patient, Jada Lion, to the [facility] [place]. Please see a copy of my visit note below.    CHIEF COMPLAINT:  Pain of the Spine       HISTORY OF PRESENT ILLNESS  Mr. Lion is a pleasant 33 year old male who presents to clinic today with back and leg pain.      Jada reports to clinic today for an evaluation of lumbar back pain that radiates into his left buttock. He notes that he was playing indoor volleyball which he does often, came down from a spike and landed onto his left leg. As he landed he experienced a \"jamming sensation\" in his left hip and a \"pop\" in his lower back. Since he has noticed lumbar back pain that worsens with standing, sitting, forward flexion. He notes no numbness, tingling, leg weakness, loss of bowel or bladder control. Lying prone takes away his back as well as lumbar extension. He notes no previous hx of back issues. Treatment thus far has consisted of hydromorphone, ice, heat, and chiropractic care.        Additional history: as documented    MEDICAL HISTORY  Patient Active Problem List   Diagnosis     Moderate persistent asthma     Seasonal allergic rhinitis     Plantar fasciitis     Overweight (BMI 25.0-29.9)     CARDIOVASCULAR SCREENING; LDL GOAL LESS THAN 160     Moderate persistent asthma without complication     Hip pain, left     Tobacco use disorder     Rotator cuff injury, right, initial encounter     Cheilitis     Dry eyes     Acute left-sided low back pain without sciatica       Current Outpatient Medications   Medication Sig Dispense Refill     albuterol (PROAIR HFA/PROVENTIL HFA/VENTOLIN HFA) 108 (90 Base) MCG/ACT inhaler Inhale 1-2 puffs into the lungs every 4 hours as needed for shortness of breath or wheezing 18 g 1     albuterol (PROVENTIL) (2.5 MG/3ML) 0.083% neb solution Qid prn 225 mL 0     " fluticasone-salmeterol (ADVAIR DISKUS) 500-50 MCG/ACT inhaler Inhale 1 puff into the lungs 2 times daily 1 each 11     hydrocortisone 2.5 % cream Apply BID to affected region(s) for 7-10 days. 30 g 0     HYDROmorphone (DILAUDID) 2 MG tablet Take 1 tablet (2 mg) by mouth every 6 hours as needed for pain 10 tablet 0     order for DME Equipment being ordered: Nebulizer, tubing and mask 1 Device 0     predniSONE (DELTASONE) 20 MG tablet 1 tab daily for 3 days, then 1/2 tab daily for 4 days 5 tablet 0     triamcinolone (KENALOG) 0.5 % external ointment Apply to affected areas 2 times a day for a maximum of 2 weeks 45 g 0       Allergies   Allergen Reactions     Nuts      Other Food Allergy      scallops     Shrimp      Singulair [Montelukast]      Abdominal pain     Wheat Bran        Family History   Problem Relation Age of Onset     Diabetes Mother      Hypertension Mother      Asthma Mother      Other Cancer Maternal Grandfather      Other Cancer Paternal Grandfather         unsure if heart disease      Coronary Artery Disease No family hx of        Additional medical/Social/Surgical histories reviewed in EPIC and updated as appropriate.        PHYSICAL EXAM  General  - normal appearance, in no obvious distress    Musculoskeletal - lumbar spine  - stance: slow to rise and sit  - inspection: normal bone and joint alignment, no obvious scoliosis  - palpation: left posterior hip tenderness  - ROM: pain with bilateral rotation, flexion past 30 deg, extension  - strength: lower extremities 5/5 in all planes  - special tests:  (-) slump test bilaterally  Neuro  - patellar and Achilles DTRs 2+ bilaterally, no sensory or motor deficit, grossly normal coordination, normal muscle tone             ASSESSMENT & PLAN  Mr. Lion is a 33 year old male who presents to clinic today with acute left-sided low back and posterior hip pain.    I reviewed his imaging in the room with him, while he does have an L4-5 disc bulge is difficult to  tell exactly how much symptoms this is causing acutely, compared to a likely left SI joint contusion.  He is nearing the end of his prednisone taper, I am prescribing him meloxicam to start when his prednisone is finished.  I am also referring him to physical therapy.    If his symptoms are not improving whatsoever over the course of the next 4 to 6 weeks I would likely recommend either an intra-articular SI joint injection or imaging of his SI joint and hip directly.    It was a pleasure seeing Jada today.    Armando Reina DO, [facility]  Primary Care Sports Medicine      This note was constructed using Dragon dictation software, please excuse any minor errors in spelling, grammar, or syntax.

## 2023-10-31 NOTE — LETTER
2023     Seen today: Yes    Patient:  Jada Lion  :   1990  MRN:     [id number]  Physician: ARMANDO REINA        To whom it may concern,     Jada Lion was seen in clinic today for a low back injury. Please allow him to avoid bending, lifting, carrying, pushing, pulling, greater than 10lb for the next two weeks (10/31/23 - 23).               Electronically signed by Armando Reina DO

## 2023-11-03 ENCOUNTER — DOCUMENTATION ONLY (OUTPATIENT)
Dept: ORTHOPEDICS | Facility: CLINIC | Age: 33
End: 2023-11-03
Payer: COMMERCIAL

## 2023-11-03 NOTE — PROGRESS NOTES
Received Completed forms Yes   Faxed Forms Faxed To: [employer].   Fax Number: [phone]   Sent to HIM (Date) 11/2/23

## 2023-11-08 ENCOUNTER — THERAPY VISIT (OUTPATIENT)
Dept: PHYSICAL THERAPY | Facility: CLINIC | Age: 33
End: 2023-11-08
Payer: COMMERCIAL

## 2023-11-08 DIAGNOSIS — M53.3 PAIN OF LEFT SACROILIAC JOINT: ICD-10-CM

## 2023-11-08 DIAGNOSIS — M54.50 ACUTE LEFT-SIDED LOW BACK PAIN WITHOUT SCIATICA: Primary | ICD-10-CM

## 2023-11-08 PROCEDURE — 97110 THERAPEUTIC EXERCISES: CPT | Mod: GP | Performed by: PHYSICAL THERAPIST

## 2023-11-08 PROCEDURE — 97161 PT EVAL LOW COMPLEX 20 MIN: CPT | Mod: GP | Performed by: PHYSICAL THERAPIST

## 2023-11-08 NOTE — PROGRESS NOTES
PHYSICAL THERAPY EVALUATION  Type of Visit: Evaluation    See electronic medical record for Abuse and Falls Screening details.    Pt presents with 1+ week history of acute low back pain after he was playing volleyball and landed awkwardly onto L leg and felt a pop and had immediate severe low back pain. He admits to having this once in the past with similar mechanism and feels unstable in hip at times with some LLE buckling at hip. Has had some improvement already over the 12 days since injury, he has seen a chiropractor and attempted ice and heat without improvement. He is very active and has continued to walk and run which are painfree but has pain with sitting and bending forward and some discomfort with static standing.    Subjective       Presenting condition or subjective complaint: Lower back pain  Date of onset: 10/29/23    Relevant medical history: Asthma   Dates & types of surgery: 2010    Prior diagnostic imaging/testing results: CT scan; X-ray     Prior therapy history for the same diagnosis, illness or injury: Yes Chiropractor    Prior Level of Function  Transfers:   Ambulation:   ADL:   IADL:     Living Environment  Social support: With a significant other or spouse   Type of home: [facility]   Stairs to enter the home: No       Ramp: No   Stairs inside the home: Yes 20 Is there a railing: Yes   Help at home: None  Equipment owned:       Employment: Yes Ware house   Hobbies/Interests: Volleyball hunting    Patient goals for therapy: Everything    Pain assessment: pain in L low back with sitting, bending forward, and standing. Resolves with walking, jogging.     Objective   LUMBAR SPINE EVALUATION  PAIN:   INTEGUMENTARY (edema, incisions):   POSTURE: slouched sitting  GAIT:   Weightbearing Status:   Assistive Device(s):   Gait Deviations:   BALANCE/PROPRIOCEPTION:   WEIGHTBEARING ALIGNMENT:   NON-WEIGHTBEARING ALIGNMENT: pelvic landmarks symmetrical  ROM: lumbar AROM flx painful to thighs, full  extension with end range discomfort, BSB full equal and painfree.  PELVIC/SI SCREEN:   STRENGTH: B hip abd and ext 4/5 each, all other BLE normal 5/5, some pain with knee ext R>L   MYOTOMES:   DTR S:   CORD SIGNS:   DERMATOMES: normal light touch sensation  NEURAL TENSION: slump and SLR tests + bilaterally R>L  FLEXIBILITY: soft tissue tightness in B hamstrings and piriformii  LUMBAR/HIP Special Tests:    PELVIS/SI SPECIAL TESTS:   FUNCTIONAL TESTS:   PALPATION: no TTP  SPINAL SEGMENTAL CONCLUSIONS: spring testing negative lumbo-sacral    Assessment & Plan   CLINICAL IMPRESSIONS  Medical Diagnosis: pain in L sacroiliac joint    Treatment Diagnosis: low back pain   Impression/Assessment: Patient is a 33 year old male with low back pain complaints.  The following significant findings have been identified: Pain, Decreased ROM/flexibility, Decreased strength, and Impaired posture. These impairments interfere with their ability to perform self care tasks, work tasks, recreational activities, household chores, and driving  as compared to previous level of function.     Clinical Decision Making (Complexity):  Clinical Presentation: Stable/Uncomplicated  Clinical Presentation Rationale: based on medical and personal factors listed in PT evaluation  Clinical Decision Making (Complexity): Low complexity    PLAN OF CARE  Treatment Interventions:  Modalities: Cryotherapy, Hot Pack  Interventions: Manual Therapy, Neuromuscular Re-education, Therapeutic Activity, Therapeutic Exercise    Long Term Goals     PT Goal 1  Goal Identifier: bending  Goal Description: pt will be able to bend so that hands reach mid tibias painfree.  Rationale: to maximize safety and independence with performance of ADLs and functional tasks;to maximize safety and independence with self cares  Target Date: 01/31/24      Frequency of Treatment: 1x/wk  Duration of Treatment: 12wks    Recommended Referrals to Other Professionals:   Education Assessment:    Learner/Method: Patient;Demonstration;Pictures/Video  Education Comments: time spent discussing spine anatomy and mechanics, rehab progression and expectations, posture during sitting, driving, lifting, standing    Risks and benefits of evaluation/treatment have been explained.   Patient/Family/caregiver agrees with Plan of Care.     Evaluation Time:     PT Eval, Low Complexity Minutes (07700): 15       Signing Clinician: JJ Kelley [facility]                                                                                   OUTPATIENT PHYSICAL THERAPY      PLAN OF TREATMENT FOR OUTPATIENT REHABILITATION   Patient's Last Name, First Name, Jada Gómez YOB: 1990   Provider's Name   [facility]   Medical Record No.  [id number]     Onset Date: 10/29/23  Start of Care Date: 11/08/23     Medical Diagnosis:  pain in L sacroiliac joint      PT Treatment Diagnosis:  low back pain Plan of Treatment  Frequency/Duration: 1x/wk/ 12wks    Certification date from 11/08/23 to 01/31/24         See note for plan of treatment details and functional goals     Felipe Vieyra PT                         I CERTIFY THE NEED FOR THESE SERVICES FURNISHED UNDER        THIS PLAN OF TREATMENT AND WHILE UNDER MY CARE     (Physician attestation of this document indicates review and certification of the therapy plan).                Referring Provider:  Armando Reina, DO      Initial Assessment  See Epic Evaluation- Start of Care Date: 11/08/23

## 2023-11-15 ENCOUNTER — TELEPHONE (OUTPATIENT)
Dept: PHYSICAL THERAPY | Facility: CLINIC | Age: 33
End: 2023-11-15

## 2023-11-16 ENCOUNTER — OFFICE VISIT (OUTPATIENT)
Dept: ORTHOPEDICS | Facility: CLINIC | Age: 33
End: 2023-11-16
Payer: COMMERCIAL

## 2023-11-16 DIAGNOSIS — M53.3 PAIN OF LEFT SACROILIAC JOINT: Primary | ICD-10-CM

## 2023-11-16 DIAGNOSIS — M54.50 ACUTE LEFT-SIDED LOW BACK PAIN WITHOUT SCIATICA: ICD-10-CM

## 2023-11-16 PROCEDURE — 99203 OFFICE O/P NEW LOW 30 MIN: CPT | Performed by: PEDIATRICS

## 2023-11-16 NOTE — LETTER
11/16/2023         RE: Jada Lion  [address] [id number]  [place] [address]        Dear Colleague,    Thank you for referring your patient, Jada Lion, to the [facility]. Please see a copy of my visit note below.    ASSESSMENT & PLAN    Jada was seen today for pain.    Diagnoses and all orders for this visit:    Pain of left sacroiliac joint    Acute left-sided low back pain without sciatica      This issue is acute and Improving.        ICD-10-CM    1. Pain of left sacroiliac joint  M53.3       2. Acute left-sided low back pain without sciatica  M54.50         Patient Instructions   We discussed these other possible diagnosis: Prior low back injury overall improving. Likely SI joint dysfunction per initial report and exam, reassuring exam today.    Plan:  - Today's Plan of Care:  Monitor symptoms  Work letter to return  Continue Home Exercise Program    Discussed activity considerations and other supportive care including Ice/Heat, OTC and other topical medications as needed.    -We also discussed other future treatment options:  Lumbar MRI  Si joint injection    Follow Up: as needed    Concerning signs and symptoms were reviewed and all questions were answered at this time.    Anna Goodwin MD [facility]  Sports Medicine Physician  [facility]    -----  Chief Complaint   Patient presents with     Lower Back - Pain       SUBJECTIVE  Jada Lion is a/an 33 year old male who is seen as a self referral for evaluation of low back pain.  Note: patient is wanting to return to work     The patient is seen by themselves.    Onset: 2.5 week(s) ago. Patient describes injury as jumping and landing on his L leg. Felt like his leg jammed up the body, went to the ED and followed up with Dr. Reina.    Location of Pain: middle, tailbone pain, Lumbosacral junction  Worsened by: laying down, in the AM, resting too long (feeling stiff)  Better with: getting going, moving  unsure  Treatments tried: ice, heat, Tylenol, other medications: Hydrocodone/Acetaminophen (Vicodin/Norco), home exercises, physical therapy (2 visits), and previous imaging (xray 10/29/23 and CT Scan 10/29/23)  Associated symptoms: pain after resting , heavy lifting on L leg causes instability in the L hip  - Overall improving, only noticing stiffness now, less pain, able to do everything at work he needs to    Orthopedic/Surgical history: yes, previously had this 1 year ago. It pinches for 1 week and then goes away.   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   General: healthy, alert and in no distress  Skin: no suspicious lesions or rash.  CV: distal perfusion intact  Resp: normal respiratory effort without conversational dyspnea   Psych: normal mood and affect  Gait: NORMAL  Neuro: Normal light sensory exam of lower extremity    Low back exam:    Inspection:     no visible deformity in the low back       normal skin       normal vascular       normal lymphatic       no asymmetry    Posture:      normal    Foot Inspection:     no deformity noted    Tender:     none currently    Non Tender:     remainder of lumbar spine    ROM:      full flexion       full extension    Strength:     hip flexion 5/5 bilateral       knee extension 5/5 bilateral       ankle dorsiflexion 5/5 bilateral       ankle plantarflexion 5/5 bilateral    Sensation:    grossly intact throughout lower extremities    Special tests:      straight leg raise left negative        straight leg raise right negative       neg (-) JOSUE  bilateral       neg (-) FADIR  bilateral      RADIOLOGY:  Final results and radiologist's interpretation, available in the [facility] health record.  Images were reviewed with the patient in the office today.  My personal interpretation of  the performed imaging:     Reviewed prior lumbar XRs 10/29/2023, with concern for small osseous fragment posterior to the L2 pedicle and minimal osteophytes and multilevel facet arthropathy    Reviewed CT of the lumbar spine 10/29/2023 without acute fracture, L4-L5 central disc protrusion    Review of prior external note(s) from - ED and [facility]  Review of the result(s) of each unique test - XR and CT             Again, thank you for allowing me to participate in the care of your patient.        Sincerely,        Anna Goodwin MD

## 2023-11-16 NOTE — PATIENT INSTRUCTIONS
We discussed these other possible diagnosis: Prior low back injury overall improving. Likely SI joint dysfunction per initial report and exam, reassuring exam today.    Plan:  - Today's Plan of Care:  Monitor symptoms  Work letter to return  Continue Home Exercise Program    Discussed activity considerations and other supportive care including Ice/Heat, OTC and other topical medications as needed.    -We also discussed other future treatment options:  Lumbar MRI  Si joint injection    Follow Up: as needed    If you have any further questions for your physician or physician s care team you can call [phone] and use option 3 to leave a voice message.

## 2023-11-16 NOTE — PROGRESS NOTES
ASSESSMENT & PLAN    Jada was seen today for pain.    Diagnoses and all orders for this visit:    Pain of left sacroiliac joint    Acute left-sided low back pain without sciatica      This issue is acute and Improving.        ICD-10-CM    1. Pain of left sacroiliac joint  M53.3       2. Acute left-sided low back pain without sciatica  M54.50         Patient Instructions   We discussed these other possible diagnosis: Prior low back injury overall improving. Likely SI joint dysfunction per initial report and exam, reassuring exam today.    Plan:  - Today's Plan of Care:  Monitor symptoms  Work letter to return  Continue Home Exercise Program    Discussed activity considerations and other supportive care including Ice/Heat, OTC and other topical medications as needed.    -We also discussed other future treatment options:  Lumbar MRI  Si joint injection    Follow Up: as needed    Concerning signs and symptoms were reviewed and all questions were answered at this time.    Anna Goodwin MD [facility]  Sports Medicine Physician  [facility]    -----  Chief Complaint   Patient presents with    Lower Back - Pain       SUBJECTIVE  Jada Lion is a/an 33 year old male who is seen as a self referral for evaluation of low back pain.  Note: patient is wanting to return to work     The patient is seen by themselves.    Onset: 2.5 week(s) ago. Patient describes injury as jumping and landing on his L leg. Felt like his leg jammed up the body, went to the ED and followed up with Dr. Reina.    Location of Pain: middle, tailbone pain, Lumbosacral junction  Worsened by: laying down, in the AM, resting too long (feeling stiff)  Better with: getting going, moving unsure  Treatments tried: ice, heat, Tylenol, other medications: Hydrocodone/Acetaminophen (Vicodin/Norco), home exercises, physical therapy (2 visits), and previous imaging (xray 10/29/23 and CT Scan 10/29/23)  Associated symptoms: pain after resting , heavy  lifting on L leg causes instability in the L hip  - Overall improving, only noticing stiffness now, less pain, able to do everything at work he needs to    Orthopedic/Surgical history: yes, previously had this 1 year ago. It pinches for 1 week and then goes away.   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   General: healthy, alert and in no distress  Skin: no suspicious lesions or rash.  CV: distal perfusion intact  Resp: normal respiratory effort without conversational dyspnea   Psych: normal mood and affect  Gait: NORMAL  Neuro: Normal light sensory exam of lower extremity    Low back exam:    Inspection:     no visible deformity in the low back       normal skin       normal vascular       normal lymphatic       no asymmetry    Posture:      normal    Foot Inspection:     no deformity noted    Tender:     none currently    Non Tender:     remainder of lumbar spine    ROM:      full flexion       full extension    Strength:     hip flexion 5/5 bilateral       knee extension 5/5 bilateral       ankle dorsiflexion 5/5 bilateral       ankle plantarflexion 5/5 bilateral    Sensation:    grossly intact throughout lower extremities    Special tests:      straight leg raise left negative        straight leg raise right negative       neg (-) JOSUE  bilateral       neg (-) FADIR  bilateral      RADIOLOGY:  Final results and radiologist's interpretation, available in the [facility] health record.  Images were reviewed with the patient in the office today.  My personal interpretation of the performed imaging:     Reviewed prior lumbar XRs 10/29/2023, with concern for small osseous fragment posterior to the L2 pedicle and minimal osteophytes and multilevel facet arthropathy    Reviewed CT of the lumbar spine 10/29/2023 without acute fracture,  L4-L5 central disc protrusion    Review of prior external note(s) from - ED and [facility]  Review of the result(s) of each unique test - XR and CT

## 2023-11-16 NOTE — LETTER
November 16, 2023      Jada Lion  [address] [address]  [place] [address]        To Whom It May Concern,     Jada was seen for low back pain.  He can return to work without restrictions on Monday November 11/20.      Sincerely,        Anna Goodwin MD

## 2024-01-06 ENCOUNTER — MYC REFILL (OUTPATIENT)
Dept: FAMILY MEDICINE | Facility: CLINIC | Age: 34
End: 2024-01-06
Payer: COMMERCIAL

## 2024-01-06 DIAGNOSIS — L30.9 DERMATITIS: ICD-10-CM

## 2024-01-06 DIAGNOSIS — J45.41 MODERATE PERSISTENT ASTHMA WITH ACUTE EXACERBATION: ICD-10-CM

## 2024-01-06 DIAGNOSIS — J45.40 MODERATE PERSISTENT ASTHMA WITHOUT COMPLICATION: ICD-10-CM

## 2024-01-08 RX ORDER — TRIAMCINOLONE ACETONIDE 5 MG/G
OINTMENT TOPICAL
Qty: 45 G | Refills: 0 | Status: SHIPPED | OUTPATIENT
Start: 2024-01-08

## 2024-01-08 RX ORDER — ALBUTEROL SULFATE 90 UG/1
1-2 AEROSOL, METERED RESPIRATORY (INHALATION) EVERY 4 HOURS PRN
Qty: 18 G | Refills: 1 | Status: SHIPPED | OUTPATIENT
Start: 2024-01-08 | End: 2024-04-10

## 2024-01-08 RX ORDER — ALBUTEROL SULFATE 0.83 MG/ML
SOLUTION RESPIRATORY (INHALATION)
Qty: 225 ML | Refills: 0 | Status: SHIPPED | OUTPATIENT
Start: 2024-01-08 | End: 2024-04-11

## 2024-01-10 RX ORDER — FLUTICASONE PROPIONATE AND SALMETEROL 500; 50 UG/1; UG/1
1 POWDER RESPIRATORY (INHALATION) 2 TIMES DAILY
Qty: 1 EACH | Refills: 0 | Status: SHIPPED | OUTPATIENT
Start: 2024-01-10 | End: 2024-02-07

## 2024-02-07 DIAGNOSIS — J45.41 MODERATE PERSISTENT ASTHMA WITH ACUTE EXACERBATION: ICD-10-CM

## 2024-02-07 RX ORDER — FLUTICASONE PROPIONATE AND SALMETEROL 500; 50 UG/1; UG/1
1 POWDER RESPIRATORY (INHALATION) 2 TIMES DAILY
Qty: 60 EACH | Refills: 0 | Status: SHIPPED | OUTPATIENT
Start: 2024-02-07 | End: 2024-04-11

## 2024-02-07 NOTE — TELEPHONE ENCOUNTER
Rx sent.  Patient is due for a physical exam.  Please schedule an in person preventative visit for future refills.  If possible, please go over any overdue screening questionnaires (PHQ-2, PHQ-9, ACT, etc.).

## 2024-04-10 ENCOUNTER — MYC REFILL (OUTPATIENT)
Dept: FAMILY MEDICINE | Facility: CLINIC | Age: 34
End: 2024-04-10
Payer: COMMERCIAL

## 2024-04-10 DIAGNOSIS — J45.41 MODERATE PERSISTENT ASTHMA WITH ACUTE EXACERBATION: ICD-10-CM

## 2024-04-10 RX ORDER — ALBUTEROL SULFATE 90 UG/1
AEROSOL, METERED RESPIRATORY (INHALATION)
Qty: 8.5 G | OUTPATIENT
Start: 2024-04-10

## 2024-04-10 RX ORDER — ALBUTEROL SULFATE 90 UG/1
1-2 AEROSOL, METERED RESPIRATORY (INHALATION) EVERY 4 HOURS PRN
Qty: 18 G | Refills: 1 | Status: SHIPPED | OUTPATIENT
Start: 2024-04-10 | End: 2024-04-11

## 2024-04-10 ASSESSMENT — ASTHMA QUESTIONNAIRES
QUESTION_2 LAST FOUR WEEKS HOW OFTEN HAVE YOU HAD SHORTNESS OF BREATH: MORE THAN ONCE A DAY
QUESTION_4 LAST FOUR WEEKS HOW OFTEN HAVE YOU USED YOUR RESCUE INHALER OR NEBULIZER MEDICATION (SUCH AS ALBUTEROL): THREE OR MORE TIMES PER DAY
ACT_TOTALSCORE: 6
QUESTION_3 LAST FOUR WEEKS HOW OFTEN DID YOUR ASTHMA SYMPTOMS (WHEEZING, COUGHING, SHORTNESS OF BREATH, CHEST TIGHTNESS OR PAIN) WAKE YOU UP AT NIGHT OR EARLIER THAN USUAL IN THE MORNING: TWO OR THREE NIGHTS A WEEK
QUESTION_1 LAST FOUR WEEKS HOW MUCH OF THE TIME DID YOUR ASTHMA KEEP YOU FROM GETTING AS MUCH DONE AT WORK, SCHOOL OR AT HOME: ALL OF THE TIME
ACT_TOTALSCORE: 6
QUESTION_5 LAST FOUR WEEKS HOW WOULD YOU RATE YOUR ASTHMA CONTROL: NOT CONTROLLED AT ALL

## 2024-04-11 ENCOUNTER — VIRTUAL VISIT (OUTPATIENT)
Dept: FAMILY MEDICINE | Facility: CLINIC | Age: 34
End: 2024-04-11
Payer: COMMERCIAL

## 2024-04-11 DIAGNOSIS — J45.40 MODERATE PERSISTENT ASTHMA WITHOUT COMPLICATION: Primary | ICD-10-CM

## 2024-04-11 PROCEDURE — G2211 COMPLEX E/M VISIT ADD ON: HCPCS | Mod: 95 | Performed by: PHYSICIAN ASSISTANT

## 2024-04-11 PROCEDURE — 99214 OFFICE O/P EST MOD 30 MIN: CPT | Mod: 95 | Performed by: PHYSICIAN ASSISTANT

## 2024-04-11 RX ORDER — FLUTICASONE PROPIONATE AND SALMETEROL 50; 500 UG/1; UG/1
1 POWDER RESPIRATORY (INHALATION) EVERY 12 HOURS
Qty: 60 EACH | Refills: 1 | Status: SHIPPED | OUTPATIENT
Start: 2024-04-11 | End: 2024-05-13

## 2024-04-11 RX ORDER — ALBUTEROL SULFATE 90 UG/1
1-2 AEROSOL, METERED RESPIRATORY (INHALATION) EVERY 4 HOURS PRN
Qty: 18 G | Refills: 1 | Status: SHIPPED | OUTPATIENT
Start: 2024-04-11 | End: 2024-05-13

## 2024-04-11 RX ORDER — ALBUTEROL SULFATE 0.83 MG/ML
SOLUTION RESPIRATORY (INHALATION)
Qty: 225 ML | Refills: 1 | Status: SHIPPED | OUTPATIENT
Start: 2024-04-11 | End: 2024-07-15

## 2024-04-11 NOTE — LETTER
My Asthma Action Plan    Name: Jada Lion   YOB: 1990  Date: 4/11/2024   My doctor: Ariana Gonzalez PA-C   My clinic: [facility]        My Control Medicine: Fluticasone propionate + salmeterol (Advair Diskus or Wixela Inhub) -  500/50 mcg 1 puff twice per day  My Rescue Medicine: Albuterol nebulizer solution qid prn  Albuterol (Proair/Ventolin/Proventil HFA) 2-4 puffs EVERY 4 HOURS as needed. Use a spacer if recommended by your provider.   My Asthma Severity:   Moderate Persistent  Know your asthma triggers:   dust, cold air, outside environment            GREEN ZONE   Good Control  I feel good  No cough or wheeze  Can work, sleep and play without asthma symptoms       Take your asthma control medicine every day.     If exercise triggers your asthma, take your rescue medication  15 minutes before exercise or sports, and  During exercise if you have asthma symptoms  Spacer to use with inhaler: If you have a spacer, make sure to use it with your inhaler             YELLOW ZONE Getting Worse  I have ANY of these:  I do not feel good  Cough or wheeze  Chest feels tight  Wake up at night   Keep taking your Green Zone medications  Start taking your rescue medicine:  every 20 minutes for up to 1 hour. Then every 4 hours for 24-48 hours.  If you stay in the Yellow Zone for more than 12-24 hours, contact your doctor.  If you do not return to the Green Zone in 12-24 hours or you get worse, start taking your oral steroid medicine if prescribed by your provider.           RED ZONE Medical Alert - Get Help  I have ANY of these:  I feel awful  Medicine is not helping  Breathing getting harder  Trouble walking or talking  Nose opens wide to breathe       Take your rescue medicine NOW  If your provider has prescribed an oral steroid medicine, start taking it NOW  Call your doctor NOW  If you are still in the Red Zone after 20 minutes and you have not reached your doctor:  Take your  rescue medicine again and  Call 911 or go to the emergency room right away    See your regular doctor within 2 weeks of an Emergency Room or Urgent Care visit for follow-up treatment.          Annual Reminders:  Meet with Asthma Educator,  Flu Shot in the Fall, consider Pneumonia Vaccination for patients with asthma (aged 19 and older).    Pharmacy:    [facility] [id number] [place], [place] - [address]  [id number] - [facility] [address]  AT [facility] [address]    Electronically signed by rAiana Gonzalez PA-C   Date: 04/11/24                      Asthma Triggers  How To Control Things That Make Your Asthma Worse    Triggers are things that make your asthma worse.  Look at the list below to help you find your triggers and what you can do about them.  You can help prevent asthma flare-ups by staying away from your triggers.      Trigger                                                          What you can do   Cigarette Smoke  Tobacco smoke can make asthma worse. Do not allow smoking in your home, car or around you.  Be sure no one smokes at a child s day care or school.  If you smoke, ask your health care provider for ways to help you quit.  Ask family members to quit too.  Ask your health care provider for a referral to Quit Plan to help you quit smoking, or call 3-420-082-PLAN.     Colds, Flu, Bronchitis  These are common triggers of asthma. Wash your hands often.  Don t touch your eyes, nose or mouth.  Get a flu shot every year.     Dust Mites  These are tiny bugs that live in cloth or carpet. They are too small to see. Wash sheets and blankets in hot water every week.   Encase pillows and mattress in dust mite proof covers.  Avoid having carpet if you can. If you have carpet, vacuum weekly.   Use a dust mask and HEPA vacuum.   Pollen and Outdoor Mold  Some people are allergic to trees, grass, or weed pollen, or molds. Try to keep your windows closed.  Limit  time out doors when pollen count is high.   Ask you health care provider about taking medicine during allergy season.     Animal Dander  Some people are allergic to skin flakes, urine or saliva from pets with fur or feathers. Keep pets with fur or feathers out of your home.    If you can t keep the pet outdoors, then keep the pet out of your bedroom.  Keep the bedroom door closed.  Keep pets off cloth furniture and away from stuffed toys.     Mice, Rats, and Cockroaches   Some people are allergic to the waste from these pests.   Cover food and garbage.  Clean up spills and food crumbs.  Store grease in the refrigerator.   Keep food out of the bedroom.   Indoor Mold  This can be a trigger if your home has high moisture. Fix leaking faucets, pipes, or other sources of water.   Clean moldy surfaces.  Dehumidify basement if it is damp and smelly.   Smoke, Strong Odors, and Sprays  These can reduce air quality. Stay away from strong odors and sprays, such as perfume, powder, hair spray, paints, smoke incense, paint, cleaning products, candles and new carpet.   Exercise or Sports  Some people with asthma have this trigger. Be active!  Ask your doctor about taking medicine before sports or exercise to prevent symptoms.    Warm up for 5-10 minutes before and after sports or exercise.     Other Triggers of Asthma  Cold air:  Cover your nose and mouth with a scarf.  Sometimes laughing or crying can be a trigger.  Some medicines and food can trigger asthma.

## 2024-04-11 NOTE — PROGRESS NOTES
Jada is a 33 year old who is being evaluated via a billable video visit.    How would you like to obtain your AVS? MyChart  If the video visit is dropped, the invitation should be resent by: Text to cell phone: [phone]  Will anyone else be joining your video visit? No      Assessment & Plan     Moderate persistent asthma without complication  We discussed the importance of follow-up, as his current ACT score is quite low (6).  Discussed that asthma can be very dangerous and turn bad quickly if we do not monitor things appropriately.  He has difficulty coming into the clinic as he is sometimes will work 4 hours away and this makes it difficult for him to plan appointments.  He also has 2 small children that he cannot always find care for when he needs appts.      I suggest he contact my care team directly to help reschedule an appointment if needed (as I do have spots available sooner than 3 months out).  Discussed the importance of establishing with just 1 PCP to help manage his asthma long term (and also to help with prior authorization coverage forms for medications, especially as the preferred meds do not seem to work as well for him).      He verbalized understanding and f/up appt scheduled in 1 month (for a physical and asthma recheck) .     Will switch from wixela to advair, as his asthma is not well controlled on current medication.  Discussed that PA's can take 3 weeks for approval, he states he is willing to pay out of pocket for this medication regardless of coverage.        - ADVAIR DISKUS 500-50 MCG/ACT inhaler; Inhale 1 puff into the lungs every 12 hours  - albuterol (PROAIR HFA/PROVENTIL HFA/VENTOLIN HFA) 108 (90 Base) MCG/ACT inhaler; Inhale 1-2 puffs into the lungs every 4 hours as needed for shortness of breath or wheezing  - albuterol (PROVENTIL) (2.5 MG/3ML) 0.083% neb solution; Qid prn    The longitudinal plan of care for the diagnosis(es)/condition(s) as documented were addressed during  "this visit. Due to the added complexity in care, I will continue to support Jada in the subsequent management and with ongoing continuity of care.          BMI  Estimated body mass index is 33.09 kg/m  as calculated from the following:    Height as of 10/29/23: 1.676 m (5' 6\").    Weight as of 10/29/23: 93 kg (205 lb).             Subjective   Jada is a 33 year old, presenting for the following health issues:  RECHECK      4/11/2024     7:37 AM   Additional Questions   Roomed by Patient completed echeck in via [employer]     Video Start Time: 10:10 AM    History of Present Illness     Asthma:  He presents for follow up of asthma.  He has some cough, some wheezing, and some shortness of breath.  He is using a relief medication a few times a month. He does not miss any doses of his controller medication throughout the week. Patient is aware of the following triggers: cold air, dust mites, emotions, exercise or sports, humidity, occupational exposure, strong odors and fumes and upper respiratory infections. The patient has not had a visit to the Emergency Room, Urgent Care or Hospital due to asthma since the last clinic visit.     He eats 2-3 servings of fruits and vegetables daily.He consumes 3 sweetened beverage(s) daily.He exercises with enough effort to increase his heart rate 60 or more minutes per day.  He exercises with enough effort to increase his heart rate 3 or less days per week.   He is taking medications regularly.     He feels breathing is not well controlled on current inhaler.     He has Wixela (off brand) and this is much less effective than advair diskus.  He states he is willing to pay out of pocket for the advair as it helps his breathing better.    He has tried flovent in the past as well.      More daytime issues with shortness of breath.  No cough or wheezing.  No pain.  Does not feel sick and no fevers.      Does not feel the inhaler works as well as the diskus.                Review of " Systems  Constitutional, HEENT, cardiovascular, pulmonary, gi and gu systems are negative, except as otherwise noted.      Objective           Vitals:  No vitals were obtained today due to virtual visit.    Physical Exam   GENERAL: alert and no distress  EYES: Eyes grossly normal to inspection.  No discharge or erythema, or obvious scleral/conjunctival abnormalities.  RESP: No audible wheeze, cough, or visible cyanosis.  Able to talk in full sentences without respiratory distress.   SKIN: Visible skin clear. No significant rash, abnormal pigmentation or lesions.  NEURO: Cranial nerves grossly intact.  Mentation and speech appropriate for age.  PSYCH: Appropriate affect, tone, and pace of words          Video-Visit Details    Type of service:  Video Visit   Video End Time:10:25 AM  Originating Location (pt. Location): Home    Distant Location (provider location):  On-site  Platform used for Video Visit: [employer]  Signed Electronically by: Ariana Gonzalez PA-C

## 2024-04-17 ENCOUNTER — TELEPHONE (OUTPATIENT)
Dept: FAMILY MEDICINE | Facility: CLINIC | Age: 34
End: 2024-04-17
Payer: COMMERCIAL

## 2024-04-17 NOTE — TELEPHONE ENCOUNTER
Forms received from: [employer]   Phone number listed: [phone]   Fax listed: [phone]  Date received: 4/15/24  Form description: Generic substitution request  Once forms are completed, please return to [employer] via fax.  Is patient requesting to be contacted when forms are completed: na  Phone: na  Form placed:  Ariana Dawson

## 2024-04-18 ENCOUNTER — MEDICAL CORRESPONDENCE (OUTPATIENT)
Dept: HEALTH INFORMATION MANAGEMENT | Facility: CLINIC | Age: 34
End: 2024-04-18
Payer: COMMERCIAL

## 2024-05-02 ENCOUNTER — ANCILLARY PROCEDURE (OUTPATIENT)
Dept: GENERAL RADIOLOGY | Facility: CLINIC | Age: 34
End: 2024-05-02
Attending: PHYSICIAN ASSISTANT
Payer: COMMERCIAL

## 2024-05-02 ENCOUNTER — OFFICE VISIT (OUTPATIENT)
Dept: URGENT CARE | Facility: URGENT CARE | Age: 34
End: 2024-05-02
Payer: OTHER MISCELLANEOUS

## 2024-05-02 VITALS
OXYGEN SATURATION: 96 % | DIASTOLIC BLOOD PRESSURE: 78 MMHG | HEART RATE: 64 BPM | WEIGHT: 199.1 LBS | BODY MASS INDEX: 32.14 KG/M2 | TEMPERATURE: 98.7 F | SYSTOLIC BLOOD PRESSURE: 126 MMHG

## 2024-05-02 DIAGNOSIS — M54.50 LEFT LUMBAR PAIN: Primary | ICD-10-CM

## 2024-05-02 PROCEDURE — 99213 OFFICE O/P EST LOW 20 MIN: CPT | Performed by: PHYSICIAN ASSISTANT

## 2024-05-02 PROCEDURE — 72100 X-RAY EXAM L-S SPINE 2/3 VWS: CPT | Mod: TC | Performed by: RADIOLOGY

## 2024-05-02 RX ORDER — CYCLOBENZAPRINE HCL 5 MG
10 TABLET ORAL 3 TIMES DAILY PRN
Qty: 30 TABLET | Refills: 0 | Status: SHIPPED | OUTPATIENT
Start: 2024-05-02 | End: 2024-05-13

## 2024-05-02 ASSESSMENT — PAIN SCALES - GENERAL: PAINLEVEL: EXTREME PAIN (8)

## 2024-05-02 NOTE — PROGRESS NOTES
HPI work comp -- pleasant but mildly distressed 34 yo with left side low back pain post trying tp move multiple heavy boxes while supine and moving in rotating motion., felt pop and had left side low back and buttock pain immediately, no loss B/B control, any bending sets it off      ROS no hx known, no loss of bowel or bladder control, no DM, + asthma      Physical Exam  Constitutional:       General: He is not in acute distress.     Appearance: Normal appearance. He is well-developed. He is not diaphoretic.   HENT:      Head: Normocephalic and atraumatic.      Right Ear: Ear canal and external ear normal. No drainage.      Left Ear: Ear canal and external ear normal. No drainage.      Nose: Nose normal.      Mouth/Throat:      Pharynx: No oropharyngeal exudate.   Eyes:      General: Lids are normal. No scleral icterus.        Right eye: No discharge.         Left eye: No discharge.      Conjunctiva/sclera: Conjunctivae normal.      Right eye: Right conjunctiva is not injected.      Left eye: Left conjunctiva is not injected.      Pupils: Pupils are equal, round, and reactive to light.   Neck:      Thyroid: No thyromegaly.      Trachea: No tracheal deviation.   Cardiovascular:      Rate and Rhythm: Normal rate and regular rhythm.      Pulses: Normal pulses.      Heart sounds: Normal heart sounds. No murmur heard.     No friction rub.   Pulmonary:      Effort: Pulmonary effort is normal. No respiratory distress.      Breath sounds: Normal breath sounds. No stridor. No wheezing, rhonchi or rales.      Comments: Respiratory rate normal, no stridor, no respiratory distress,No wheeze, no retractions, no rales, breath sounds present in all fields, nl bronchophony and fremitus, non-tender to palp     Chest:      Chest wall: No tenderness.   Abdominal:      General: Bowel sounds are normal.      Palpations: Abdomen is soft.      Tenderness: There is no abdominal tenderness.   Musculoskeletal:         General: Normal range  of motion.      Cervical back: Normal range of motion and neck supple. No edema or rigidity. No spinous process tenderness.      Comments: + SLR, + much increased low back pain moving seated to supine, no CVA percuss pain, no spinous process pain from cervical, thoracic and lumbar spine,  nl DTR patella, Lower extremities reveal no signs of deep venous thrombosis; calves and thighs are soft without swelling, induration or tenderness, Andressa's sign is negative.    increased back pain anterior flex at hip while standing , much increased pain with supine rotation to left versus. resistance   Lymphadenopathy:      Cervical: No cervical adenopathy.      Right cervical: No superficial or posterior cervical adenopathy.     Left cervical: No superficial or posterior cervical adenopathy.   Skin:     General: Skin is warm.      Nails: There is no clubbing.   Neurological:      Mental Status: He is alert.      Cranial Nerves: No cranial nerve deficit.      Sensory: No sensory deficit.   Psychiatric:         Speech: Speech normal.         Behavior: Behavior normal.         Thought Content: Thought content normal.     Follow up wih PCP PRN 3 -5 days

## 2024-05-02 NOTE — LETTER
May 2, 2024      Jada Lion  [address] [address]  [place] [address]        To Whom It May Concern:    Jada Lion  was seen on 5/2/2024.  Please excuse him  until 5/6/2024 due to injury.        Sincerely,        Amish Tobar PA-C

## 2024-05-06 ENCOUNTER — OFFICE VISIT (OUTPATIENT)
Dept: FAMILY MEDICINE | Facility: CLINIC | Age: 34
End: 2024-05-06
Payer: OTHER MISCELLANEOUS

## 2024-05-06 VITALS
SYSTOLIC BLOOD PRESSURE: 114 MMHG | HEART RATE: 69 BPM | OXYGEN SATURATION: 98 % | TEMPERATURE: 98.3 F | DIASTOLIC BLOOD PRESSURE: 70 MMHG | WEIGHT: 199.8 LBS | RESPIRATION RATE: 18 BRPM | HEIGHT: 66 IN | BODY MASS INDEX: 32.11 KG/M2

## 2024-05-06 DIAGNOSIS — Z23 NEED FOR VACCINATION: ICD-10-CM
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
## 2024-05-06 DIAGNOSIS — M54.50 LEFT LUMBAR PAIN: ICD-10-CM

## 2024-05-06 PROCEDURE — 99213 OFFICE O/P EST LOW 20 MIN: CPT | Performed by: NURSE PRACTITIONER

## 2024-05-06 PROCEDURE — 91320 SARSCV2 VAC 30MCG TRS-SUC IM: CPT | Performed by: NURSE PRACTITIONER

## 2024-05-06 PROCEDURE — 90480 ADMN SARSCOV2 VAC 1/ONLY CMP: CPT | Performed by: NURSE PRACTITIONER

## 2024-05-06 RX ORDER — NAPROXEN 500 MG/1
500 TABLET ORAL 2 TIMES DAILY WITH MEALS
Qty: 30 TABLET | Refills: 0 | Status: SHIPPED | OUTPATIENT
Start: 2024-05-06

## 2024-05-06 RX ORDER — TIZANIDINE 2 MG/1
2-4 TABLET ORAL 3 TIMES DAILY
Qty: 30 TABLET | Refills: 0 | Status: SHIPPED | OUTPATIENT
Start: 2024-05-06

## 2024-05-06 ASSESSMENT — PAIN SCALES - GENERAL: PAINLEVEL: EXTREME PAIN (8)

## 2024-05-06 NOTE — NURSING NOTE
Prior to immunization administration, verified patients identity using patient s name and date of birth. Please see Immunization Activity for additional information.     Screening Questionnaire for Adult Immunization    Are you sick today?   No   Do you have allergies to medications, food, a vaccine component or latex?   No   Have you ever had a serious reaction after receiving a vaccination?   No   Do you have a long-term health problem with heart, lung, kidney, or metabolic disease (e.g., diabetes), asthma, a blood disorder, no spleen, complement component deficiency, a cochlear implant, or a spinal fluid leak?  Are you on long-term aspirin therapy?   Yes   Do you have cancer, leukemia, HIV/AIDS, or any other immune system problem?   No   Do you have a parent, brother, or sister with an immune system problem?   No   In the past 3 months, have you taken medications that affect  your immune system, such as prednisone, other steroids, or anticancer drugs; drugs for the treatment of rheumatoid arthritis, Crohn s disease, or psoriasis; or have you had radiation treatments?   YES   Have you had a seizure, or a brain or other nervous system problem?   No   During the past year, have you received a transfusion of blood or blood    products, or been given immune (gamma) globulin or antiviral drug?   No   For women: Are you pregnant or is there a chance you could become       pregnant during the next month?   No   Have you received any vaccinations in the past 4 weeks?   No     Immunization questionnaire was positive for at least one answer.  Notified Xuan Little CNP .      Patient instructed to remain in clinic for 15 minutes afterwards, and to report any adverse reactions.     Screening performed by Joan Ramirez MA on 5/6/2024 at 8:37 AM.

## 2024-05-06 NOTE — LETTER
May 6, 2024      Jada Lion  [address]  [place] [address]        To Whom It May Concern:    Jada Lion was seen in our clinic today 5/6/2024. It is advised he remain out of work until cleared by his PCP on 5/13/2024 due to restrictions and medications making him unable to operate heavy equipment or make safe judgements.     Current restrictions include the following: limited to light duty - lifting no greater than 0 pounds, no use of arms over shoulders, no bending/stooping, no repetitive motions, and standing limited to 15 minutes, and may not operate heavy equipment.      Sincerely,      Xuan Little, NIECY, NP-C

## 2024-05-06 NOTE — PATIENT INSTRUCTIONS
Left lower back pain:  Back strain.  Take naproxen as prescribed for pain. Take with food.  If you develop any upset stomach or GI symptoms, please contact us.  Take tizanidine as prescribed for muscle spasms/pain. Do not drive or operate heavy equipment while on medication.  Rest, ice/heat.   Follow-up with PCP as scheduled.   Work note printed.

## 2024-05-06 NOTE — PROGRESS NOTES
"  Assessment & Plan     Work related injury  Work related injury to left lower back 1 week ago. He was seen by , but muscle relaxer is too strong and he isn't able to safely watch his kids while on medication or rest. He isn't taking anything over the counter currently. Naproxen and tizanidine prescribed to take the place of other medication he was taking. Advised against driving or operating machinery while on medication. Work note with restrictions given. Advised he be out another week to let back rest. He has follow-up with PCP in 1 week and she write further restrictions. No imaging done today as imaging was completed on 5/2/24 and was normal.     - naproxen (NAPROSYN) 500 MG tablet; Take 1 tablet (500 mg) by mouth 2 times daily (with meals)  - tiZANidine (ZANAFLEX) 2 MG tablet; Take 1-2 tablets (2-4 mg) by mouth 3 times daily    Left lumbar pain  See above.     - naproxen (NAPROSYN) 500 MG tablet; Take 1 tablet (500 mg) by mouth 2 times daily (with meals)  - tiZANidine (ZANAFLEX) 2 MG tablet; Take 1-2 tablets (2-4 mg) by mouth 3 times daily    Need for vaccination  Given.     - COVID-19 12+ (2023-24) (PFIZER)    Review of the result(s) of each unique test - x-ray results  Prescription drug management        BMI  Estimated body mass index is 32.03 kg/m  as calculated from the following:    Height as of this encounter: 1.682 m (5' 6.22\").    Weight as of this encounter: 90.6 kg (199 lb 12.8 oz).   Weight management plan: Discussed healthy diet and exercise guidelines      Patient Instructions   Left lower back pain:  Back strain.  Take naproxen as prescribed for pain. Take with food.  If you develop any upset stomach or GI symptoms, please contact us.  Take tizanidine as prescribed for muscle spasms/pain. Do not drive or operate heavy equipment while on medication.  Rest, ice/heat.   Follow-up with PCP as scheduled.   Work note printed.       Marlen Elias is a 33 year old, presenting for the following " "health issues:  Work Comp    History of Present Illness       Back Pain:  He presents for follow up of back pain. Patient's back pain is a new problem.    Original cause of back pain: lifting  First noticed back pain: in the last week  Patient feels back pain: constantlyLocation of back pain:  Left lower back  Description of back pain: sharp and shooting  Back pain spreads: left buttocks    Since patient first noticed back pain, pain is: gradually worsening  Does back pain interfere with his job:  Yes  On a scale of 1-10 (10 being the worst), patient describes pain as:  8  What makes back pain worse: bending, coughing, sitting, standing and twisting   Acupuncture: not tried  Acetaminophen: not helpful  Activity or exercise: not helpful  Chiropractor:  Not tried  Cold: not helpful  Heat: not helpful  Massage: helpful  Muscle relaxants: helpful  NSAIDS: not helpful  Opioids: not tried  Physical Therapy: not tried  Rest: helpful  Steroid Injection: not tried  Stretching: not helpful  Surgery: not tried  TENS unit: not tried  Topical pain relievers: not helpful  Other healthcare providers patient is seeing for back pain: None    He eats 0-1 servings of fruits and vegetables daily.He consumes 3 sweetened beverage(s) daily.He exercises with enough effort to increase his heart rate 30 to 60 minutes per day.  He exercises with enough effort to increase his heart rate 3 or less days per week.   He is taking medications regularly.           Additional provider notes: Patient presents in clinic for work related work injury that happened on May 1st. He works in a warehouse. He states he was on hands and knees and trying to lift in a \"pinch point area\" and felt a pinch/twinge in lower left above buttock. Feeling sensation going down buttock. Left work that same day due to pain. Feels pain has gotten worse and it is constant. Pain is 8/10. He was doing ibuprofen and tylenol, but stopped since it wasn't working (states he was " taking ~1500mg daily). He was seen by  provider on 5/2/24 and was prescribed flexeril. But states he hasn't been able to take it a lot due to drowsiness and having to watch kids. Has two kids he has to watch at home (1 and 2yo). He hasn't been lifting them. Unable to stand longer than 15 minutes at a time without significant pain.   -no loss of b/b control  -Hx of slip disc injury at home 1 year ago.   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   -sees PCP on May 13th. Previous work note was to be out until today.     Allergies   Allergen Reactions    Nuts     Other Food Allergy      scallops    Shrimp     Singulair [Montelukast]      Abdominal pain    Wheat        Current Outpatient Medications   Medication Sig Dispense Refill    ADVAIR DISKUS 500-50 MCG/ACT inhaler Inhale 1 puff into the lungs every 12 hours 60 each 1    albuterol (PROAIR HFA/PROVENTIL HFA/VENTOLIN HFA) 108 (90 Base) MCG/ACT inhaler Inhale 1-2 puffs into the lungs every 4 hours as needed for shortness of breath or wheezing 18 g 1    albuterol (PROVENTIL) (2.5 MG/3ML) 0.083% neb solution Qid prn 225 mL 1    cyclobenzaprine (FLEXERIL) 5 MG tablet Take 2 tablets (10 mg) by mouth 3 times daily as needed for muscle spasms (start with one and see how it affects you and your asthma) 30 tablet 0    meloxicam (MOBIC) 15 MG tablet Take one pill daily for 2 weeks, then daily as needed (Patient not taking: Reported on 5/2/2024) 30 tablet 1    triamcinolone (KENALOG) 0.5 % external ointment Apply to affected areas 2 times a day for a maximum of 2 weeks (Patient not taking: Reported on 5/2/2024) 45 g 0     No current facility-administered medications for this visit.       Past Medical History:   Diagnosis Date    Obesity     Tobacco abuse     Uncomplicated asthma             Review of Systems  Constitutional, HEENT, cardiovascular, pulmonary, gi and gu systems are negative, except as otherwise noted.     "  Objective    Temp 98.3  F (36.8  C) (Oral)   Resp 18   Ht 1.682 m (5' 6.22\")   Wt 90.6 kg (199 lb 12.8 oz)   BMI 32.03 kg/m    Body mass index is 32.03 kg/m .  Physical Exam  Vitals reviewed.   Constitutional:       General: He is not in acute distress.     Appearance: Normal appearance. He is not ill-appearing or toxic-appearing.   Musculoskeletal:      Lumbar back: Tenderness (left lower muscles) present. No swelling, spasms or bony tenderness. Decreased range of motion (due to pain).      Comments: Sitting on his right side in clinic   Skin:     General: Skin is warm and dry.   Neurological:      Mental Status: He is alert and oriented to person, place, and time.   Psychiatric:         Behavior: Behavior normal.            XR Lumbar Spine 2/3 Views    Result Date: 5/3/2024  EXAM: XR LUMBAR SPINE 2/3 VIEWS LOCATION: [facility] DATE: 5/2/2024 INDICATION:  Left lumbar pain COMPARISON: None.     IMPRESSION: 5 lumbar-type vertebral bodies. The vertebral bodies of the lumbar spine have normal stature and alignment without evidence of compression fracture. The disc spaces are well-maintained.  No significant degenerative changes. Soft tissues unremarkable.         Signed Electronically by: Xuan Little DNP    "

## 2024-05-09 SDOH — HEALTH STABILITY: PHYSICAL HEALTH: ON AVERAGE, HOW MANY DAYS PER WEEK DO YOU ENGAGE IN MODERATE TO STRENUOUS EXERCISE (LIKE A BRISK WALK)?: 1 DAY

## 2024-05-09 SDOH — HEALTH STABILITY: PHYSICAL HEALTH: ON AVERAGE, HOW MANY MINUTES DO YOU ENGAGE IN EXERCISE AT THIS LEVEL?: 20 MIN

## 2024-05-09 ASSESSMENT — SOCIAL DETERMINANTS OF HEALTH (SDOH): HOW OFTEN DO YOU GET TOGETHER WITH FRIENDS OR RELATIVES?: NEVER

## 2024-05-13 ENCOUNTER — MYC MEDICAL ADVICE (OUTPATIENT)
Dept: FAMILY MEDICINE | Facility: CLINIC | Age: 34
End: 2024-05-13
Payer: COMMERCIAL

## 2024-05-13 ENCOUNTER — TELEPHONE (OUTPATIENT)
Dept: FAMILY MEDICINE | Facility: CLINIC | Age: 34
End: 2024-05-13
Payer: COMMERCIAL

## 2024-05-13 ENCOUNTER — OFFICE VISIT (OUTPATIENT)
Dept: FAMILY MEDICINE | Facility: CLINIC | Age: 34
End: 2024-05-13
Payer: OTHER MISCELLANEOUS

## 2024-05-13 VITALS
SYSTOLIC BLOOD PRESSURE: 116 MMHG | WEIGHT: 202 LBS | TEMPERATURE: 97 F | DIASTOLIC BLOOD PRESSURE: 64 MMHG | RESPIRATION RATE: 16 BRPM | BODY MASS INDEX: 32.47 KG/M2 | HEIGHT: 66 IN | OXYGEN SATURATION: 100 % | HEART RATE: 73 BPM
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
## 2024-05-13 DIAGNOSIS — M54.42 LEFT-SIDED LOW BACK PAIN WITH LEFT-SIDED SCIATICA, UNSPECIFIED CHRONICITY: ICD-10-CM

## 2024-05-13 DIAGNOSIS — J45.40 MODERATE PERSISTENT ASTHMA WITHOUT COMPLICATION: ICD-10-CM

## 2024-05-13 PROCEDURE — 99214 OFFICE O/P EST MOD 30 MIN: CPT | Performed by: PHYSICIAN ASSISTANT

## 2024-05-13 RX ORDER — FLUTICASONE PROPIONATE AND SALMETEROL 50; 500 UG/1; UG/1
1 POWDER RESPIRATORY (INHALATION) EVERY 12 HOURS
Qty: 60 EACH | Refills: 2 | Status: SHIPPED | OUTPATIENT
Start: 2024-05-13 | End: 2024-07-15

## 2024-05-13 RX ORDER — METHYLPREDNISOLONE 4 MG
TABLET, DOSE PACK ORAL
Qty: 21 TABLET | Refills: 0 | Status: SHIPPED | OUTPATIENT
Start: 2024-05-13 | End: 2024-07-15

## 2024-05-13 RX ORDER — ALBUTEROL SULFATE 90 UG/1
1-2 AEROSOL, METERED RESPIRATORY (INHALATION) EVERY 4 HOURS PRN
Qty: 18 G | Refills: 1 | Status: SHIPPED | OUTPATIENT
Start: 2024-05-13 | End: 2024-07-15

## 2024-05-13 ASSESSMENT — ASTHMA QUESTIONNAIRES
QUESTION_3 LAST FOUR WEEKS HOW OFTEN DID YOUR ASTHMA SYMPTOMS (WHEEZING, COUGHING, SHORTNESS OF BREATH, CHEST TIGHTNESS OR PAIN) WAKE YOU UP AT NIGHT OR EARLIER THAN USUAL IN THE MORNING: NOT AT ALL
QUESTION_2 LAST FOUR WEEKS HOW OFTEN HAVE YOU HAD SHORTNESS OF BREATH: THREE TO SIX TIMES A WEEK
QUESTION_5 LAST FOUR WEEKS HOW WOULD YOU RATE YOUR ASTHMA CONTROL: SOMEWHAT CONTROLLED
ACT_TOTALSCORE: 16
ACT_TOTALSCORE: 16
QUESTION_1 LAST FOUR WEEKS HOW MUCH OF THE TIME DID YOUR ASTHMA KEEP YOU FROM GETTING AS MUCH DONE AT WORK, SCHOOL OR AT HOME: SOME OF THE TIME
QUESTION_4 LAST FOUR WEEKS HOW OFTEN HAVE YOU USED YOUR RESCUE INHALER OR NEBULIZER MEDICATION (SUCH AS ALBUTEROL): ONE OR TWO TIMES PER DAY

## 2024-05-13 ASSESSMENT — PAIN SCALES - GENERAL: PAINLEVEL: EXTREME PAIN (8)

## 2024-05-13 NOTE — PATIENT INSTRUCTIONS
Please call [facility] at [phone]  to set up the MRI of your back.   I also suggest scheduling an appt with the back specialist after the MRI to review and discuss your back.     Continue with daily stretching of the low back.   Heat.   Keep moving around.  I suggest holding off on volleyball for a while (talk with spine specialist further).  Try swimming, this is very good for the back.  Walking and day to day activities are fine (no heavy lifting or jumping).

## 2024-05-13 NOTE — TELEPHONE ENCOUNTER
Asthma Control Test (Act) For People 12 Years And Older    Question 5/13/2024 10:22 AM CDT - Filed by Patient   1. In the past 4 weeks, how much of the time did your asthma keep you from getting as much done at work, school or at home? Some of the time   2. During the past 4 weeks, how often have you had shortness of breath? Three to six times a week   3. During the past 4 weeks, how often did your asthma symptoms (wheezing, coughing, shortness of breath, chest tightness or pain) wake you up at night or earlier than usual in the morning? Not at all   4. During the past 4 weeks, how often have you used your rescue inhaler or nebulizer medication (such as albuterol)? One or two times per day   5. How would you rate your asthma control during the past 4 weeks? Somewhat controlled   Peq Act Total Score (range: 5 - 25) 16     I reviewed the message from Ariana Gonzalez PA-C with patient and he verbalized a good understanding.     He completed the ACT during his visit today.     He prefers to pay out of pocket for Advair and is also asking for a refill on his Albuterol inhaler.     Viv VON  [facility]     
Advair diskus not covered. The preferred alternative is Wixela, Breoellipta. Please send new Rx.  
Please send an ACT to patient through [employer] to fill out and then call a few hours later.  If he has not filled out, please administer over phone and have him reference [employer] copy for copyright purposes).      Please notify patient, insurance is again denying advair. Does he want to continue to pay out of pocket for this or I could send a script for Breoellipta (he has tried Wixela without control of asthma symptoms).     He should be very cautious with breathing, especially with the air quality alerts.     If ACT < 20, please schedule an asthma recheck with me sooner than July (we could not discuss today during workman's comp visit).  If > 20, ok to wait until July.    Ariana Gonzalez PA-C        
See MyChart encounter.     Per the direction of Ariana Gonzalez PA-C, ACT sent to patient to complete & return.     Viv Hall RN BSN  [facility]     
show

## 2024-05-13 NOTE — LETTER
[facility]  [address]  [place] [address]  Phone: [phone]    May 13, 2024        Jada Lion  [address]  [place] [address]          To whom it may concern:    RE: oRsaskera ORTEGA Lion    Patient may return to work 5/15/24 with the following:  Light duty-unable to lift more than 10 pounds.   No standing for more than 3 hours at a time.    Restrictions effective through June 1, 2024.     Please contact me for questions or concerns.      Sincerely,      Ariana Gonzalez PA-C

## 2024-05-13 NOTE — TELEPHONE ENCOUNTER
I called patient and he actually completed the ACT in the clinic today.   Asthma Control Test (Act) For People 12 Years And Older    Question 5/13/2024 10:22 AM CDT - Filed by Patient   1. In the past 4 weeks, how much of the time did your asthma keep you from getting as much done at work, school or at home? Some of the time   2. During the past 4 weeks, how often have you had shortness of breath? Three to six times a week   3. During the past 4 weeks, how often did your asthma symptoms (wheezing, coughing, shortness of breath, chest tightness or pain) wake you up at night or earlier than usual in the morning? Not at all   4. During the past 4 weeks, how often have you used your rescue inhaler or nebulizer medication (such as albuterol)? One or two times per day   5. How would you rate your asthma control during the past 4 weeks? Somewhat controlled   Peq Act Total Score (range: 5 - 25) 16        Viv RASMUSSEN  [facility]

## 2024-05-13 NOTE — PROGRESS NOTES
Assessment & Plan     Work related injury  Was recently lifting boxes at work and triggered back pain (this is acute on chronic pain).   Working restrictions placed, see letter.     Patient instructions:   Please call [facility] at [phone]  to set up the MRI of your back.   I also suggest scheduling an appt with the back specialist after the MRI to review and discuss your back.     Continue with daily stretching of the low back.   Heat.   Keep moving around.  I suggest holding off on volleyball for a while (talk with spine specialist further).  Try swimming, this is very good for the back.  Walking and day to day activities are fine (no heavy lifting or jumping).     - methylPREDNISolone (MEDROL DOSEPAK) 4 MG tablet therapy pack; Follow Package Directions  - MR Lumbar Spine w/o & w Contrast; Future  - Spine  Referral; Future  - Physical Therapy  Referral; Future    Left-sided low back pain with left-sided sciatica, unspecified chronicity    Patient instructions:   Please call [facility] at [phone]  to set up the MRI of your back.   I also suggest scheduling an appt with the back specialist after the MRI to review and discuss your back.     Continue with daily stretching of the low back.   Heat.   Keep moving around.  I suggest holding off on volleyball for a while (talk with spine specialist further).  Try swimming, this is very good for the back.  Walking and day to day activities are fine (no heavy lifting or jumping).       - methylPREDNISolone (MEDROL DOSEPAK) 4 MG tablet therapy pack; Follow Package Directions  - MR Lumbar Spine w/o & w Contrast; Future  - Spine  Referral; Future  - Physical Therapy  Referral; Future        32 minutes spent by me on the date of the encounter doing chart review, history and exam, documentation and further activities per the note      Counseling  Appropriate preventive services were discussed with  this patient, including applicable screening as appropriate for fall prevention, nutrition, physical activity, Tobacco-use cessation, weight loss and cognition.  Checklist reviewing preventive services available has been given to the patient.  Reviewed patient's diet, addressing concerns and/or questions.   He is at risk for lack of exercise and has been provided with information to increase physical activity for the benefit of his well-being.   Patient is at risk for social isolation and has been provided with information about the benefit of social connection.   The patient was instructed to see the dentist every 6 months.       CONSULTATION/REFERRAL to spine specialist.     Marlen Elias is a 33 year old, presenting for the following health issues:  Work Comp        5/13/2024    10:34 AM   Additional Questions   Roomed by Janet   Accompanied by Self         5/13/2024    10:34 AM   Patient Reported Additional Medications   Patient reports taking the following new medications na       Patient arrived to follow-up with Lower Back Work Comp Injury, last seen 05/06/24 at [facility].  Injured May 1st, 2024.        Work Comp Back Pain Follow Up    Where is your back pain located? (Select all that apply) Lower Left Back   How would you describe your back pain?  shooting  Where does your back pain spread? To the left knee  Since your last clinic visit for back pain, how has your pain changed? gradually worsening, spreading further down Left Leg   Does your back pain interfere with your job? YES  Since your last visit, have you tried any new treatment? Patient taking only tiZANidine (ZANAFLEX) 2 MG tablet and naproxen (NAPROSYN) 500 MG tablet at night due to side effects of drowsiness, pt feels he is not getting full effects of medication during the day    Having more radiation of pain down the left leg.    He is feeling very stiff.    He is trying to stay active, but it is not helping.    He is only  "using the muscle relaxor (tizandine) at night (as he cannot use it during the day when he has his young children).  Tizanidine is better tolerated than flexeril.    Uses naproxen BID with food.      Job: Works in a warehouse.   He does a lot of lifting of boxes (50-80 lbs) or on feet all day.  Previously was working construction.     Pain is on left low back and shoots down to above left knee (in back).  No numbness or tingling.  Has a constant shooting pain.      Had previous CT scan in October 2023 which showed a bulging disc resulting in moderate spinal canal stenosis at L4-L5.    He was seeing a chiropractor daily in the past for his back pain and this did help.   Did physical therapy (a couple sessions) this didn't seem to help as much.      He does have a prior history of back pain that started about 2-3 years ago.  1st injury was when he landed hard playing volleyball. More recent injury in October 2023 was also after a volleyball injury.    Back pains will come and go.      This current episode of back pain was triggered by lifting boxes at work.               Review of Systems  Constitutional, neuro, ENT, endocrine, pulmonary, cardiac, gastrointestinal, genitourinary, musculoskeletal, integument and psychiatric systems are negative, except as otherwise noted.      Objective    /64 (BP Location: Left arm, Patient Position: Chair, Cuff Size: Adult Regular)   Pulse 73   Temp 97  F (36.1  C) (Tympanic)   Resp 16   Ht 1.682 m (5' 6.22\")   Wt 91.6 kg (202 lb)   SpO2 100%   BMI 32.39 kg/m    Body mass index is 32.39 kg/m .  Physical Exam   GENERAL: alert and no distress      Patient appears to be moderate pain, antalgic gait noted (walks very stiff). Lumbosacral spine area reveals normal spinal curvature and no local tenderness or mass.  Painful and reduced LS ROM noted. motor strength normal, including heel and toe gait.                 Signed Electronically by: Ariana Gonzalez PA-C    Answers " submitted by the patient for this visit:  Back Pain Visit Questionnaire (Submitted on 5/6/2024)  Your back pain is: new  New Back Pain Visit Questionnaire  (Submitted on 5/6/2024)  What do you think is the original cause of your back pain?: lifting  When did you first notice your back pain? : in the last week  How would you describe your back pain? : sharp, shooting  How often do you feel your back pain? : constantly  Where is your back pain located? : left lower back  Where does your back pain spread? : left buttocks  Since you noticed your back pain, how has it changed? : gradually worsening  Does your back pain interfere with your job?: Yes  On a scale of 1-10 (10 being the worst), how strong is your back pain?: 8  What makes your back pain worse? : bending, coughing, sitting, standing, twisting  Acupuncture:: not tried  Acetaminophen: not helpful  Activity or Exercise: not helpful  Chiropractor: not tried  Cold: not helpful  Heat: not helpful  Massage: helpful  Muscle relaxants : helpful  NSAIDS (Ibuprofen, Naproxen) : not helpful  Opioids: not tried  Physical Therapy: not tried  Rest: helpful  Steroid Injection: not tried  Stretching : not helpful  Surgery: not tried  TENS Unit: not tried  Topical pain relievers : not helpful  Do you see any other healthcare providers for your back pain? : None  General Questionnaire (Submitted on 5/6/2024)  Chief Complaint: Chronic problems general questions HPI Form  How many servings of fruits and vegetables do you eat daily?: 0-1  On average, how many sweetened beverages do you drink each day (Examples: soda, juice, sweet tea, etc.  Do NOT count diet or artificially sweetened beverages)?: 3  How many minutes a day do you exercise enough to make your heart beat faster?: 30 to 60  How many days a week do you exercise enough to make your heart beat faster?: 3 or less  How many days per week do you miss taking your medication?: 0

## 2024-05-28 ENCOUNTER — OFFICE VISIT (OUTPATIENT)
Dept: PHYSICAL MEDICINE AND REHAB | Facility: CLINIC | Age: 34
End: 2024-05-28
Attending: PHYSICIAN ASSISTANT
Payer: OTHER MISCELLANEOUS

## 2024-05-28 VITALS
HEIGHT: 66 IN | WEIGHT: 205 LBS | SYSTOLIC BLOOD PRESSURE: 142 MMHG | BODY MASS INDEX: 32.95 KG/M2 | HEART RATE: 73 BPM | DIASTOLIC BLOOD PRESSURE: 83 MMHG
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
## 2024-05-28 DIAGNOSIS — M54.42 LEFT-SIDED LOW BACK PAIN WITH LEFT-SIDED SCIATICA, UNSPECIFIED CHRONICITY: ICD-10-CM

## 2024-05-28 PROCEDURE — 99203 OFFICE O/P NEW LOW 30 MIN: CPT | Performed by: NURSE PRACTITIONER

## 2024-05-28 ASSESSMENT — PAIN SCALES - GENERAL: PAINLEVEL: NO PAIN (0)

## 2024-05-28 NOTE — PROGRESS NOTES
ASSESSMENT: Jada Lion is a 33 year old male who presents for consultation at the request of PCP Lisa, Ariana Esposito, who presents today for new patient evaluation of:    -work related injury, left sided low back pain with left sided sciatica, unspecified chronicity    Patient is neurologically intact on exam. No myelopathic or red flag symptoms. Given symptoms are much improved, recommended completion of full course of PT. He has upcoming PT booked. OK to take naproxen and tizanidine on an as needed ongoing basis. He has a lumbar MRI ordered by another provider, I asked him to send us a message or call after he has this done so we can review.           5/28/2024     9:13 AM   OSWESTRY DISABILITY INDEX   Count 10   Sum 0   Oswestry Score (%) 0 %            Diagnoses and all orders for this visit:  Work related injury  -     Spine  Referral  Left-sided low back pain with left-sided sciatica, unspecified chronicity  -     Spine  Referral      PLAN:  Reviewed spine anatomy and disease process. Discussed diagnosis and treatment options with the patient today. A shared decision making model was used.  The patient's values and choices were respected. The following represents what was discussed and decided upon by the provider and the patient.      -DIAGNOSTIC TESTS:  Images were personally reviewed and interpreted and explained to patient today using a spine model.   -has an appt for a Lumbar MRI without contrast       -PHYSICAL THERAPY:    - has PT booked  Discussed the importance of core strengthening, ROM, stretching exercises with the patient and how each of these entities is important in decreasing pain.  Explained to the patient that the purpose of physical therapy is to teach the patient a home exercise program.  These exercises need to be performed every day in order to decrease pain and prevent future occurrences of pain.        -MEDICATIONS:    --ok to continue naproxen, tizanidine as  needed  Discussed multiple medication options today with patient. Discussed risks, side effects, and proper use of medications. Patient verbalized understanding.    -INTERVENTIONS:    -Discussed the role of injections with patient today. Patient would be a good candidate in the future for either epidural steroid injections or medial branch blocks if indicated based on symptoms and supported by imaging results.    -PATIENT EDUCATION:  Total time of 40 minutes, on the day of service, spent with the patient, reviewing the chart, placing orders, and documenting.   -Today we also discussed the pros and cons of the current treatment plan.    -FOLLOW-UP: call us after having imaging done    Advised patient to call the [facility] if symptoms worsen, new numbness or weakness develops in the legs, or if they develop new or worsening problems controlling bladder or bowel function.   ______________________________________________________________________    SUBJECTIVE:    HPI:  Jada Lion  Is a 33 year old male hx asthma, tobacco use disorder who presents today for new patient evaluation of left lumbar radiculopathy    Left low back pain into the left leg, which started after moving boxes while rotating on 5/1/24. He tried ibuprofen and tylenol, but it stopped working. He was seen in urgent care on 5/2 had lumbar XR done, and was prescribed flexeril but this caused drowsiness. Seen by [facility] on 5/6, switched to naproxen and tizanidine. Seen in follow up 5/13, medrol pack added, PT ordered, and lumbar MRI ordered (scheduled 5/30).    He has no further symptoms after the medrol dose pack. Doing very well. He has been resting, did return to doing some running. He is an  at work, carries boxes 50lbs-80lbs or so. He has tried doing about 50 lbs of lifting since started feeling better and it has not been bothersome.     He has had prior episodes of low back pain in the past which have healed with rest in the  past. He had not had any prior episodes of sciatica. He works nights. Does not sleep much.      -Treatment to Date:     -Medications:  Tylenol  Ibuprofen  Flexeril  Tizanidine  naproxen    Current Outpatient Medications   Medication Sig Dispense Refill    ADVAIR DISKUS 500-50 MCG/ACT inhaler Inhale 1 puff into the lungs every 12 hours 60 each 2    albuterol (PROAIR HFA/PROVENTIL HFA/VENTOLIN HFA) 108 (90 Base) MCG/ACT inhaler Inhale 1-2 puffs into the lungs every 4 hours as needed for shortness of breath or wheezing 18 g 1    albuterol (PROVENTIL) (2.5 MG/3ML) 0.083% neb solution Qid prn 225 mL 1    meloxicam (MOBIC) 15 MG tablet Take one pill daily for 2 weeks, then daily as needed 30 tablet 1    methylPREDNISolone (MEDROL DOSEPAK) 4 MG tablet therapy pack Follow Package Directions 21 tablet 0    naproxen (NAPROSYN) 500 MG tablet Take 1 tablet (500 mg) by mouth 2 times daily (with meals) 30 tablet 0    tiZANidine (ZANAFLEX) 2 MG tablet Take 1-2 tablets (2-4 mg) by mouth 3 times daily 30 tablet 0    triamcinolone (KENALOG) 0.5 % external ointment Apply to affected areas 2 times a day for a maximum of 2 weeks 45 g 0     No current facility-administered medications for this visit.       Allergies   Allergen Reactions    Nuts     Other Food Allergy      scallops    Shrimp     Singulair [Montelukast]      Abdominal pain    Wheat        Past Medical History:   Diagnosis Date    Obesity     Tobacco abuse     Uncomplicated asthma         Patient Active Problem List   Diagnosis    Moderate persistent asthma    Seasonal allergic rhinitis    Plantar fasciitis    Overweight (BMI 25.0-29.9)    CARDIOVASCULAR SCREENING; LDL GOAL LESS THAN 160    Moderate persistent asthma without complication    Hip pain, left    Tobacco use disorder    Rotator cuff injury, right, initial encounter    Cheilitis    Dry eyes    Acute left-sided low back pain without sciatica       Past Surgical History:   Procedure Laterality Date    DENTAL  "SURGERY  1-2015    wisdom teeth        Family History   Problem Relation Age of Onset    Diabetes Mother     Hypertension Mother     Asthma Mother     Other Cancer Maternal Grandfather     Other Cancer Paternal Grandfather         unsure if heart disease     Coronary Artery Disease No family hx of        Reviewed past medical, surgical, and family history with patient found on new patient intake packet located in EMR Media tab.     SOCIAL HX: smoking, alcohol use, and heavy drinking. no rec drug use    ROS: negative to all. Specifically negative for bowel/bladder dysfunction, balance changes, headache, dizziness, foot drop, fevers, chills, appetite changes, nausea/vomiting, unexplained weight loss. Otherwise 13 systems reviewed are negative. Please see the patient's intake questionnaire from today for details.    OBJECTIVE:  BP (!) 142/83   Pulse 73   Ht 5' 6\" (1.676 m)   Wt 205 lb (93 kg)   BMI 33.09 kg/m      PHYSICAL EXAMINATION:    --CONSTITUTIONAL:  Vital signs as above.  No acute distress.  The patient is well nourished and well groomed.  --PSYCHIATRIC:  Appropriate mood and affect. The patient is awake, alert, oriented to person, place, time and answering questions appropriately with clear speech.    --SKIN:  Skin over the face, bilateral lower extremities, and posterior torso is clean, dry, intact without rashes.    --RESPIRATORY: Normal rhythm and effort. No abnormal accessory muscle breathing patterns noted.   --ABDOMINAL:  Non-distended.    --GROSS MOTOR: Gait is non-antalgic. Easily arises from a seated position. Toe walking and heel walking are normal without significant difficulty.      --LOWER EXTREMITY MOTOR TESTING:  Hip flexion: right 5/5, left 5/5   Quads: right 5/5, left 5/5  Hamstrings: right 5/5, left 5/5  Dorsiflexion: right 5/5, left 5/5  Plantar flexion: right 5/5, left 5/5    Great toe MTP extension/EHL: right 5/5, left 5/5    --NEUROLOGICAL:  2/4 patellar and achilles reflexes " bilaterally. Sensation to light touch is intact throughout both lower extremities. Babinski is negative. No clonus.    --STANDING EXAMINATION:  Normal lumbar lordosis noted, no lateral shift.    --MUSCULOSKELETAL: Lumbar spine inspection reveals no evidence of deformity. Range of motion is not particularly limited in lumbar flexion, extension, lateral rotation. No point tenderness to palpation lumbar spine. No paraspinal musculature tenderness.     Straight leg raising is negative.    --SACROILIAC JOINT:One finger point test was negative. Negative SI joint tenderness to palpation bilaterally.    --VASCULAR:  Bilateral lower extremities are warm without any discoloration.  There is no pitting edema of the bilateral lower extremities.    RESULTS:   Prior medical records from [facility] and Care Everywhere were reviewed today.    Imaging: Spine imaging was personally reviewed and interpreted today. The images were shown to the patient and the findings were explained using a spine model.      Narrative & Impression   EXAM: CT LUMBAR SPINE W/O CONTRAST  LOCATION: [facility]  DATE: 10/29/2023     INDICATION: Low back pain.  Axial loading type injury while playing volleyball 2 day prior. Abnormal XR (L2 pedicle) CT advised. Followup imaging for further detail.  COMPARISON: Lumbar spine radiograph 10/29/2023  TECHNIQUE: Routine CT Lumbar Spine without IV contrast. Multiplanar reformats. Dose reduction techniques were used.      FINDINGS:  VERTEBRA: Normal vertebral body heights and alignment. No fracture or posttraumatic subluxation.      CANAL/FORAMINA: At L4-L5, there is a disc bulge with a central disc protrusion. This results in moderate spinal canal stenosis.     PARASPINAL: No extraspinal abnormality.                                                                      IMPRESSION:  1.  There is no acute fracture. Previously described osseous fragment adjacent to the right L1-L2 facet  is consistent with an osteophyte.     2.  At L4-L5, there is a central disc protrusion resulting in moderate spinal canal stenosis.         XR Lumbar Spine 2/3 Views    Result Date: 5/3/2024  EXAM: XR LUMBAR SPINE 2/3 VIEWS LOCATION: [facility] DATE: 5/2/2024 INDICATION:  Left lumbar pain COMPARISON: None.     IMPRESSION: 5 lumbar-type vertebral bodies. The vertebral bodies of the lumbar spine have normal stature and alignment without evidence of compression fracture. The disc spaces are well-maintained.  No significant degenerative changes. Soft tissues unremarkable.        This note was dictated using voice recognition software. Any grammatical or context distortions are unintentional and inherent to the software.       Joana MILLERP-C  [facility]  O. [phone]

## 2024-05-28 NOTE — PATIENT INSTRUCTIONS
"Importance of specialized Physical Therapy:     Today, we discussed the importance of core strengthening, ROM, and stretching exercises, and how each of these are key in decreasing pain.   The purpose of physical therapy is to teach patients a home exercise program individualized to them and their specific health concerns.  These exercises need to be performed every day in order to decrease pain and prevent future occurrences of pain.      Currently has PT booked, recommend 8-12wk course        Radicular Pain    Radicular pain in either the arm or leg is usually from a bulging disc in the spine. A portion of the herniated disc may press against the nerves as the nerves exit the spine. This causes pain which is felt down the arm or leg. Other causes of radicular pain may include:  Fractures.  Heart disease.  Cancer.  An abnormal and usually degenerative state of the nervous system or nerves (neuropathy).    In most cases, radicular pain is treated without imaging unless symptoms do not start to improve. If that is the case, your provider may order a CT or MRI scan to determine the cause.     Nerves in the cervical spine (neck) may cause radicular pain into the outer shoulder and down the arm. It can spread down to the thumb and fingers. The symptoms vary depending on which nerve root has been affected. In most cases, radicular pain improves with conservative treatment such as physical therapy, cervical traction, medications, and epidural steroid injections. A program for neck rehabilitation with stretching and strengthening exercises is an important part of management. Treatment may take months, and surgery may be considered as a last resort if the symptoms do not improve.    Nerves in the lumbar spine (lower back) may cause radicular pain into the hip and down the leg. The commonly used term for this type of pain is \"sciatica\". Conservative treatment is also recommended for this problem. Most patients feel better " after 2 to 4 weeks of rest and other supportive care. You should avoid bending, lifting, and all other activities which can make your pain worse. Physical therapy, traction, medications, and epidural steroid injections can be good options to help with your recovery. A program for back injury rehabilitation with stretching and strengthening exercises is an important part of management. Surgery may be considered as a last resort if symptoms do not improve with conservative treatment.     You may take over-the-counter or prescription medicines for pain, discomfort, or fever as directed by your caregiver. Muscle relaxants may help by relieving more severe pain and spasm. Neuropathic medication (such as gabapentin, lyrica, or cymbalta) can help decrease your symptoms of nerve pain as well. Cold or massage can also give significant relief. Spinal manipulation is not recommended as it can increase the degree of disc protrusion. We do not recommend taking narcotic medication such as percocet, oxycodone, norco, dilaudid, or others unless pain is severe and not controlled with any other oral options.    Epidural steroid injections are often effective treatment for radicular pain. These injections deliver strong anti-inflammatory medicine to the area directly around the nerve root in the space between your back bones (vertebrae). Your provider can give you more information about steroid injections. These injections are most effective when given within two weeks of the onset of acute pain. You should see your provider for follow up care as recommended.     In most cases, radicular pain is treated without imaging unless symptoms do not start to improve. If that is the case, your provider may order a CT or MRI scan to determine the cause.     SEEK IMMEDIATE MEDICAL CARE IF:  You develop increased pain, weakness, or numbness in your arm or leg.  You develop difficulty with bladder or bowel control.  You develop abdominal  pain.    Document Released: 01/25/2006 Document Revised: 03/11/2013 Document Reviewed: 04/12/2010  Patient Information  2013 [employer].       ~Please call our [facility] Nurse Navigation line [phone] with any questions or concerns about your treatment plan, if symptoms worsen and you would like to be seen urgently, or if you have any new or worsening numbness, weakness, or problems controlling bladder and bowel function.  ~You are also welcome to contact Joana Beltran via imageloop, but please be aware that responses to imageloop message may take 2-3 days due to the high volume of patients seen in clinic.

## 2024-05-28 NOTE — LETTER
5/28/2024         RE: Jada Lion  [id number]  [place] [address]        Dear Colleague,    Thank you for referring your patient, Jada Lion, to the [facility]. Please see a copy of my visit note below.    ASSESSMENT: Jada Lion is a 33 year old male who presents for consultation at the request of PCP Lisa, Ariana Esposito, who presents today for new patient evaluation of:    -work related injury, left sided low back pain with left sided sciatica, unspecified chronicity    Patient is neurologically intact on exam. No myelopathic or red flag symptoms. Given symptoms are much improved, recommended completion of full course of PT. He has upcoming PT booked. OK to take naproxen and tizanidine on an as needed ongoing basis. He has a lumbar MRI ordered by another provider, I asked him to send us a message or call after he has this done so we can review.           5/28/2024     9:13 AM   OSWESTRY DISABILITY INDEX   Count 10   Sum 0   Oswestry Score (%) 0 %            Diagnoses and all orders for this visit:  Work related injury  -     Spine  Referral  Left-sided low back pain with left-sided sciatica, unspecified chronicity  -     Spine  Referral      PLAN:  Reviewed spine anatomy and disease process. Discussed diagnosis and treatment options with the patient today. A shared decision making model was used.  The patient's values and choices were respected. The following represents what was discussed and decided upon by the provider and the patient.      -DIAGNOSTIC TESTS:  Images were personally reviewed and interpreted and explained to patient today using a spine model.   -has an appt for a Lumbar MRI without contrast       -PHYSICAL THERAPY:    - has PT booked  Discussed the importance of core strengthening, ROM, stretching exercises with the patient and how each of these entities is important in decreasing pain.  Explained to the patient that the purpose  of physical therapy is to teach the patient a home exercise program.  These exercises need to be performed every day in order to decrease pain and prevent future occurrences of pain.        -MEDICATIONS:    --ok to continue naproxen, tizanidine as needed  Discussed multiple medication options today with patient. Discussed risks, side effects, and proper use of medications. Patient verbalized understanding.    -INTERVENTIONS:    -Discussed the role of injections with patient today. Patient would be a good candidate in the future for either epidural steroid injections or medial branch blocks if indicated based on symptoms and supported by imaging results.    -PATIENT EDUCATION:  Total time of 40 minutes, on the day of service, spent with the patient, reviewing the chart, placing orders, and documenting.   -Today we also discussed the pros and cons of the current treatment plan.    -FOLLOW-UP: call us after having imaging done    Advised patient to call the Sp[facility] Center if symptoms worsen, new numbness or weakness develops in the legs, or if they develop new or worsening problems controlling bladder or bowel function.   ______________________________________________________________________    SUBJECTIVE:    HPI:  Jada Lion  Is a 33 year old male hx asthma, tobacco use disorder who presents today for new patient evaluation of left lumbar radiculopathy    Left low back pain into the left leg, which started after moving boxes while rotating on 5/1/24. He tried ibuprofen and tylenol, but it stopped working. He was seen in urgent care on 5/2 had lumbar XR done, and was prescribed flexeril but this caused drowsiness. Seen by [facility] on 5/6, switched to naproxen and tizanidine. Seen in follow up 5/13, medrol pack added, PT ordered, and lumbar MRI ordered (scheduled 5/30).    He has no further symptoms after the medrol dose pack. Doing very well. He has been resting, did return to doing some running. He is an order  selector at work, carries boxes 50lbs-80lbs or so. He has tried doing about 50 lbs of lifting since started feeling better and it has not been bothersome.     He has had prior episodes of low back pain in the past which have healed with rest in the past. He had not had any prior episodes of sciatica. He works nights. Does not sleep much.      -Treatment to Date:     -Medications:  Tylenol  Ibuprofen  Flexeril  Tizanidine  naproxen    Current Outpatient Medications   Medication Sig Dispense Refill     ADVAIR DISKUS 500-50 MCG/ACT inhaler Inhale 1 puff into the lungs every 12 hours 60 each 2     albuterol (PROAIR HFA/PROVENTIL HFA/VENTOLIN HFA) 108 (90 Base) MCG/ACT inhaler Inhale 1-2 puffs into the lungs every 4 hours as needed for shortness of breath or wheezing 18 g 1     albuterol (PROVENTIL) (2.5 MG/3ML) 0.083% neb solution Qid prn 225 mL 1     meloxicam (MOBIC) 15 MG tablet Take one pill daily for 2 weeks, then daily as needed 30 tablet 1     methylPREDNISolone (MEDROL DOSEPAK) 4 MG tablet therapy pack Follow Package Directions 21 tablet 0     naproxen (NAPROSYN) 500 MG tablet Take 1 tablet (500 mg) by mouth 2 times daily (with meals) 30 tablet 0     tiZANidine (ZANAFLEX) 2 MG tablet Take 1-2 tablets (2-4 mg) by mouth 3 times daily 30 tablet 0     triamcinolone (KENALOG) 0.5 % external ointment Apply to affected areas 2 times a day for a maximum of 2 weeks 45 g 0     No current facility-administered medications for this visit.       Allergies   Allergen Reactions     Nuts      Other Food Allergy      scallops     Shrimp      Singulair [Montelukast]      Abdominal pain     Wheat        Past Medical History:   Diagnosis Date     Obesity      Tobacco abuse      Uncomplicated asthma         Patient Active Problem List   Diagnosis     Moderate persistent asthma     Seasonal allergic rhinitis     Plantar fasciitis     Overweight (BMI 25.0-29.9)     CARDIOVASCULAR SCREENING; LDL GOAL LESS THAN 160     Moderate  "persistent asthma without complication     Hip pain, left     Tobacco use disorder     Rotator cuff injury, right, initial encounter     Cheilitis     Dry eyes     Acute left-sided low back pain without sciatica       Past Surgical History:   Procedure Laterality Date     DENTAL SURGERY  1-2015    wisdom teeth        Family History   Problem Relation Age of Onset     Diabetes Mother      Hypertension Mother      Asthma Mother      Other Cancer Maternal Grandfather      Other Cancer Paternal Grandfather         unsure if heart disease      Coronary Artery Disease No family hx of        Reviewed past medical, surgical, and family history with patient found on new patient intake packet located in EMR Media tab.     SOCIAL HX: smoking, alcohol use, and heavy drinking. no rec drug use    ROS: negative to all. Specifically negative for bowel/bladder dysfunction, balance changes, headache, dizziness, foot drop, fevers, chills, appetite changes, nausea/vomiting, unexplained weight loss. Otherwise 13 systems reviewed are negative. Please see the patient's intake questionnaire from today for details.    OBJECTIVE:  BP (!) 142/83   Pulse 73   Ht 5' 6\" (1.676 m)   Wt 205 lb (93 kg)   BMI 33.09 kg/m      PHYSICAL EXAMINATION:    --CONSTITUTIONAL:  Vital signs as above.  No acute distress.  The patient is well nourished and well groomed.  --PSYCHIATRIC:  Appropriate mood and affect. The patient is awake, alert, oriented to person, place, time and answering questions appropriately with clear speech.    --SKIN:  Skin over the face, bilateral lower extremities, and posterior torso is clean, dry, intact without rashes.    --RESPIRATORY: Normal rhythm and effort. No abnormal accessory muscle breathing patterns noted.   --ABDOMINAL:  Non-distended.    --GROSS MOTOR: Gait is non-antalgic. Easily arises from a seated position. Toe walking and heel walking are normal without significant difficulty.      --LOWER EXTREMITY MOTOR " TESTING:  Hip flexion: right 5/5, left 5/5   Quads: right 5/5, left 5/5  Hamstrings: right 5/5, left 5/5  Dorsiflexion: right 5/5, left 5/5  Plantar flexion: right 5/5, left 5/5    Great toe MTP extension/EHL: right 5/5, left 5/5    --NEUROLOGICAL:  2/4 patellar and achilles reflexes bilaterally. Sensation to light touch is intact throughout both lower extremities. Babinski is negative. No clonus.    --STANDING EXAMINATION:  Normal lumbar lordosis noted, no lateral shift.    --MUSCULOSKELETAL: Lumbar spine inspection reveals no evidence of deformity. Range of motion is not particularly limited in lumbar flexion, extension, lateral rotation. No point tenderness to palpation lumbar spine. No paraspinal musculature tenderness.     Straight leg raising is negative.    --SACROILIAC JOINT:One finger point test was negative. Negative SI joint tenderness to palpation bilaterally.    --VASCULAR:  Bilateral lower extremities are warm without any discoloration.  There is no pitting edema of the bilateral lower extremities.    RESULTS:   Prior medical records from [facility] and Care Everywhere were reviewed today.    Imaging: Spine imaging was personally reviewed and interpreted today. The images were shown to the patient and the findings were explained using a spine model.      Narrative & Impression   EXAM: CT LUMBAR SPINE W/O CONTRAST  LOCATION: [facility]  DATE: 10/29/2023     INDICATION: Low back pain.  Axial loading type injury while playing volleyball 2 day prior. Abnormal XR (L2 pedicle) CT advised. Followup imaging for further detail.  COMPARISON: Lumbar spine radiograph 10/29/2023  TECHNIQUE: Routine CT Lumbar Spine without IV contrast. Multiplanar reformats. Dose reduction techniques were used.      FINDINGS:  VERTEBRA: Normal vertebral body heights and alignment. No fracture or posttraumatic subluxation.      CANAL/FORAMINA: At L4-L5, there is a disc bulge with a central disc  protrusion. This results in moderate spinal canal stenosis.     PARASPINAL: No extraspinal abnormality.                                                                      IMPRESSION:  1.  There is no acute fracture. Previously described osseous fragment adjacent to the right L1-L2 facet is consistent with an osteophyte.     2.  At L4-L5, there is a central disc protrusion resulting in moderate spinal canal stenosis.         XR Lumbar Spine 2/3 Views    Result Date: 5/3/2024  EXAM: XR LUMBAR SPINE 2/3 VIEWS LOCATION: [facility] DATE: 5/2/2024 INDICATION:  Left lumbar pain COMPARISON: None.     IMPRESSION: 5 lumbar-type vertebral bodies. The vertebral bodies of the lumbar spine have normal stature and alignment without evidence of compression fracture. The disc spaces are well-maintained.  No significant degenerative changes. Soft tissues unremarkable.        This note was dictated using voice recognition software. Any grammatical or context distortions are unintentional and inherent to the software.       Joana MILLERP-C  [facility]  O. [phone]             Again, thank you for allowing me to participate in the care of your patient.        Sincerely,        RADHIKA Mayfield CNP

## 2024-05-30 ENCOUNTER — ANCILLARY PROCEDURE (OUTPATIENT)
Dept: MRI IMAGING | Facility: CLINIC | Age: 34
End: 2024-05-30
Attending: PHYSICIAN ASSISTANT
Payer: OTHER MISCELLANEOUS

## 2024-05-30 DIAGNOSIS — M54.42 LEFT-SIDED LOW BACK PAIN WITH LEFT-SIDED SCIATICA, UNSPECIFIED CHRONICITY: ICD-10-CM
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
## 2024-05-30 PROCEDURE — 72148 MRI LUMBAR SPINE W/O DYE: CPT | Mod: TC | Performed by: RADIOLOGY

## 2024-06-10 ENCOUNTER — VIRTUAL VISIT (OUTPATIENT)
Dept: URGENT CARE | Facility: CLINIC | Age: 34
End: 2024-06-10
Payer: COMMERCIAL

## 2024-06-10 DIAGNOSIS — Z53.9 ERRONEOUS ENCOUNTER--DISREGARD: Primary | ICD-10-CM

## 2024-06-10 PROCEDURE — 99207 PR NO CHARGE LOS: CPT | Mod: 95

## 2024-06-11 NOTE — PROGRESS NOTES
Jada presents with reports of continued coughing. He states he is currently on prednisone. His coughing has worsened and he reports he coughed so hard his vision went away for about 10 seconds and he is unsure if he had a seizure.     Due to this, I recommended Jada go to the Emergency Department immediately for appropriate work up and management. He stated he felt better now. I urged him to be seen emergently for appropriate evaluation and treatment.     [place] [facility]

## 2024-07-15 ENCOUNTER — OFFICE VISIT (OUTPATIENT)
Dept: FAMILY MEDICINE | Facility: CLINIC | Age: 34
End: 2024-07-15
Payer: COMMERCIAL

## 2024-07-15 VITALS
DIASTOLIC BLOOD PRESSURE: 60 MMHG | WEIGHT: 204.9 LBS | HEART RATE: 72 BPM | OXYGEN SATURATION: 99 % | HEIGHT: 66 IN | TEMPERATURE: 98.2 F | SYSTOLIC BLOOD PRESSURE: 120 MMHG | BODY MASS INDEX: 32.93 KG/M2 | RESPIRATION RATE: 16 BRPM

## 2024-07-15 DIAGNOSIS — Z72.0 VAPES NICOTINE CONTAINING SUBSTANCE: ICD-10-CM

## 2024-07-15 DIAGNOSIS — Z11.4 SCREENING FOR HIV (HUMAN IMMUNODEFICIENCY VIRUS): ICD-10-CM

## 2024-07-15 DIAGNOSIS — J45.40 MODERATE PERSISTENT ASTHMA WITHOUT COMPLICATION: ICD-10-CM

## 2024-07-15 DIAGNOSIS — M10.9 ACUTE GOUT OF WRIST, UNSPECIFIED CAUSE, UNSPECIFIED LATERALITY: ICD-10-CM

## 2024-07-15 DIAGNOSIS — Z00.00 ROUTINE GENERAL MEDICAL EXAMINATION AT A HEALTH CARE FACILITY: Primary | ICD-10-CM

## 2024-07-15 DIAGNOSIS — Z13.6 CARDIOVASCULAR SCREENING; LDL GOAL LESS THAN 160: ICD-10-CM

## 2024-07-15 DIAGNOSIS — Z13.1 SCREENING FOR DIABETES MELLITUS: ICD-10-CM

## 2024-07-15 DIAGNOSIS — Z11.59 NEED FOR HEPATITIS C SCREENING TEST: ICD-10-CM

## 2024-07-15 PROCEDURE — 99395 PREV VISIT EST AGE 18-39: CPT | Performed by: PHYSICIAN ASSISTANT

## 2024-07-15 PROCEDURE — 99213 OFFICE O/P EST LOW 20 MIN: CPT | Mod: 25 | Performed by: PHYSICIAN ASSISTANT

## 2024-07-15 RX ORDER — FLUTICASONE PROPIONATE AND SALMETEROL 50; 500 UG/1; UG/1
1 POWDER RESPIRATORY (INHALATION) EVERY 12 HOURS
Qty: 180 EACH | Refills: 1 | Status: SHIPPED | OUTPATIENT
Start: 2024-07-15 | End: 2024-09-30

## 2024-07-15 RX ORDER — ALBUTEROL SULFATE 90 UG/1
1-2 AEROSOL, METERED RESPIRATORY (INHALATION) EVERY 4 HOURS PRN
Qty: 18 G | Refills: 3 | Status: SHIPPED | OUTPATIENT
Start: 2024-07-15 | End: 2024-09-30

## 2024-07-15 RX ORDER — ALBUTEROL SULFATE 0.83 MG/ML
SOLUTION RESPIRATORY (INHALATION)
Qty: 225 ML | Refills: 3 | Status: SHIPPED | OUTPATIENT
Start: 2024-07-15

## 2024-07-15 RX ORDER — INDOMETHACIN 50 MG/1
50 CAPSULE ORAL
Qty: 15 CAPSULE | Refills: 1 | Status: SHIPPED | OUTPATIENT
Start: 2024-07-15 | End: 2024-07-20

## 2024-07-15 SDOH — HEALTH STABILITY: PHYSICAL HEALTH: ON AVERAGE, HOW MANY MINUTES DO YOU ENGAGE IN EXERCISE AT THIS LEVEL?: 150+ MIN

## 2024-07-15 SDOH — HEALTH STABILITY: PHYSICAL HEALTH: ON AVERAGE, HOW MANY DAYS PER WEEK DO YOU ENGAGE IN MODERATE TO STRENUOUS EXERCISE (LIKE A BRISK WALK)?: 5 DAYS

## 2024-07-15 ASSESSMENT — ASTHMA QUESTIONNAIRES
QUESTION_3 LAST FOUR WEEKS HOW OFTEN DID YOUR ASTHMA SYMPTOMS (WHEEZING, COUGHING, SHORTNESS OF BREATH, CHEST TIGHTNESS OR PAIN) WAKE YOU UP AT NIGHT OR EARLIER THAN USUAL IN THE MORNING: NOT AT ALL
QUESTION_1 LAST FOUR WEEKS HOW MUCH OF THE TIME DID YOUR ASTHMA KEEP YOU FROM GETTING AS MUCH DONE AT WORK, SCHOOL OR AT HOME: SOME OF THE TIME
ACT_TOTALSCORE: 14
QUESTION_4 LAST FOUR WEEKS HOW OFTEN HAVE YOU USED YOUR RESCUE INHALER OR NEBULIZER MEDICATION (SUCH AS ALBUTEROL): ONE OR TWO TIMES PER DAY
QUESTION_5 LAST FOUR WEEKS HOW WOULD YOU RATE YOUR ASTHMA CONTROL: SOMEWHAT CONTROLLED
QUESTION_2 LAST FOUR WEEKS HOW OFTEN HAVE YOU HAD SHORTNESS OF BREATH: MORE THAN ONCE A DAY
ACT_TOTALSCORE: 14

## 2024-07-15 ASSESSMENT — SOCIAL DETERMINANTS OF HEALTH (SDOH): HOW OFTEN DO YOU GET TOGETHER WITH FRIENDS OR RELATIVES?: ONCE A WEEK
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
## 2024-07-15 NOTE — PATIENT INSTRUCTIONS
Patient Education   Preventive Care Advice   This is general advice given by our system to help you stay healthy. However, your care team may have specific advice just for you. Please talk to your care team about your preventive care needs.  Nutrition  Eat 5 or more servings of fruits and vegetables each day.  Try wheat bread, brown rice and whole grain pasta (instead of white bread, rice, and pasta).  Get enough calcium and vitamin D. Check the label on foods and aim for 100% of the RDA (recommended daily allowance).  Lifestyle  Exercise at least 150 minutes each week  (30 minutes a day, 5 days a week).  Do muscle strengthening activities 2 days a week. These help control your weight and prevent disease.  No smoking.  Wear sunscreen to prevent skin cancer.  Have a dental exam and cleaning every 6 months.  Yearly exams  See your health care team every year to talk about:  Any changes in your health.  Any medicines your care team has prescribed.  Preventive care, family planning, and ways to prevent chronic diseases.  Shots (vaccines)   HPV shots (up to age 26), if you've never had them before.  Hepatitis B shots (up to age 59), if you've never had them before.  COVID-19 shot: Get this shot when it's due.  Flu shot: Get a flu shot every year.  Tetanus shot: Get a tetanus shot every 10 years.  Pneumococcal, hepatitis A, and RSV shots: Ask your care team if you need these based on your risk.  Shingles shot (for age 50 and up)  General health tests  Diabetes screening:  Starting at age 35, Get screened for diabetes at least every 3 years.  If you are younger than age 35, ask your care team if you should be screened for diabetes.  Cholesterol test: At age 39, start having a cholesterol test every 5 years, or more often if advised.  Bone density scan (DEXA): At age 50, ask your care team if you should have this scan for osteoporosis (brittle bones).  Hepatitis C: Get tested at least once in your life.  STIs (sexually  transmitted infections)  Before age 24: Ask your care team if you should be screened for STIs.  After age 24: Get screened for STIs if you're at risk. You are at risk for STIs (including HIV) if:  You are sexually active with more than one person.  You don't use condoms every time.  You or a partner was diagnosed with a sexually transmitted infection.  If you are at risk for HIV, ask about PrEP medicine to prevent HIV.  Get tested for HIV at least once in your life, whether you are at risk for HIV or not.  Cancer screening tests  Cervical cancer screening: If you have a cervix, begin getting regular cervical cancer screening tests starting at age 21.  Breast cancer scan (mammogram): If you've ever had breasts, begin having regular mammograms starting at age 40. This is a scan to check for breast cancer.  Colon cancer screening: It is important to start screening for colon cancer at age 45.  Have a colonoscopy test every 10 years (or more often if you're at risk) Or, ask your provider about stool tests like a FIT test every year or Cologuard test every 3 years.  To learn more about your testing options, visit:   .  For help making a decision, visit:   https://bit.ly/sx65955.  Prostate cancer screening test: If you have a prostate, ask your care team if a prostate cancer screening test (PSA) at age 55 is right for you.  Lung cancer screening: If you are a current or former smoker ages 50 to 80, ask your care team if ongoing lung cancer screenings are right for you.  For informational purposes only. Not to replace the advice of your health care provider. Copyright   2023 [facility]. All rights reserved. Clinically reviewed by the [facility]. [employer] 313734 - REV 01/24.  Substance Use Disorder: Care Instructions  Overview     You can improve your life and health by stopping your use of alcohol or drugs. When you don't drink or use drugs, you may feel and sleep better. You may  get along better with your family, friends, and coworkers. There are medicines and programs that can help with substance use disorder.  How can you care for yourself at home?  Here are some ways to help you stay sober and prevent relapse.  If you have been given medicine to help keep you sober or reduce your cravings, be sure to take it exactly as prescribed.  Talk to your doctor about programs that can help you stop using drugs or drinking alcohol.  Do not keep alcohol or drugs in your home.  Plan ahead. Think about what you'll say if other people ask you to drink or use drugs. Try not to spend time with people who drink or use drugs.  Use the time and money spent on drinking or drugs to do something that's important to you.  Preventing a relapse  Have a plan to deal with relapse. Learn to recognize changes in your thinking that lead you to drink or use drugs. Get help before you start to drink or use drugs again.  Try to stay away from situations, friends, or places that may lead you to drink or use drugs.  If you feel the need to drink alcohol or use drugs again, seek help right away. Call a trusted friend or family member. Some people get support from organizations such as Narcotics Anonymous or [facility] or from treatment facilities.  If you relapse, get help as soon as you can. Some people make a plan with another person that outlines what they want that person to do for them if they relapse. The plan usually includes how to handle the relapse and who to notify in case of relapse.  Don't give up. Remember that a relapse doesn't mean that you have failed. Use the experience to learn the triggers that lead you to drink or use drugs. Then quit again. Recovery is a lifelong process. Many people have several relapses before they are able to quit for good.  Follow-up care is a key part of your treatment and safety. Be sure to make and go to all appointments, and call your doctor if you are having problems. It's  "also a good idea to know your test results and keep a list of the medicines you take.  When should you call for help?   Call 911  anytime you think you may need emergency care. For example, call if you or someone else:    Has overdosed or has withdrawal signs. Be sure to tell the emergency workers that you are or someone else is using or trying to quit using drugs. Overdose or withdrawal signs may include:  Losing consciousness.  Seizure.  Seeing or hearing things that aren't there (hallucinations).     Is thinking or talking about suicide or harming others.   Where to get help 24 hours a day, 7 days a week   If you or someone you know talks about suicide, self-harm, a mental health crisis, a substance use crisis, or any other kind of emotional distress, get help right away. You can:    Call the Suicide and Crisis Lifeline at 988.     Call 7-673-608-TALK (1-845.365.8210).     Text HOME to 857318 to access the Crisis Text Line.   Consider saving these numbers in your phone.  Go to [employer] for more information or to chat online.  Call your doctor now or seek immediate medical care if:    You are having withdrawal symptoms. These may include nausea or vomiting, sweating, shakiness, and anxiety.   Watch closely for changes in your health, and be sure to contact your doctor if:    You have a relapse.     You need more help or support to stop.   Where can you learn more?  Go to https://www.Isoflux.net/patiented  Enter H573 in the search box to learn more about \"Substance Use Disorder: Care Instructions.\"  Current as of: November 15, 2023               Content Version: 14.0    4122-8661 [employer].   Care instructions adapted under license by your healthcare professional. If you have questions about a medical condition or this instruction, always ask your healthcare professional. [employer] disclaims any warranty or liability for your use of this information.         "

## 2024-07-16 ENCOUNTER — TELEPHONE (OUTPATIENT)
Dept: FAMILY MEDICINE | Facility: CLINIC | Age: 34
End: 2024-07-16
Payer: COMMERCIAL

## 2024-07-16 NOTE — TELEPHONE ENCOUNTER
Prior Authorization Retail Medication Request    Medication/Dose: albuterol (PROAIR HFA/PROVENTIL HFA/VENTOLIN HFA) 108 (90 Base) MCG/ACT inhaler  Diagnosis and ICD code (if different than what is on RX):  J45.40   New/renewal/insurance change PA/secondary ins. PA:  Previously Tried and Failed:  na  Rationale:  na    Insurance   Primary:  MEDICA  Insurance ID:  [id number]     Secondary (if applicable):na  Insurance ID:  dylan    Pharmacy Information (if different than what is on RX)  Name:   [facility]  Phone:  [phone]  Fax:     [phone]

## 2024-07-19 NOTE — TELEPHONE ENCOUNTER
Retail Pharmacy Prior Authorization Team   Phone: [phone]    PA Initiation    Medication: ALBUTEROL SULFATE  (90 BASE) MCG/ACT IN AERS  Insurance Company: Paid/[employer] ([employer]) - Phone [phone] Fax [phone]  Pharmacy Filling the Rx: [id number] - [place], [place] - Pending sale to [facility] [address]  Filling Pharmacy Phone: [phone]  Filling Pharmacy Fax:    Start Date: 7/19/2024    Left message for the pharmacy to use whatever Brand is preferred by insurance.  Advised if the patient is requesting something specific to call and a PA will be started for that medication.  **Instructed pharmacy to notify patient when script is ready to /ship.**

## 2024-09-21 DIAGNOSIS — Z00.00 HEALTH CARE MAINTENANCE: Primary | ICD-10-CM

## 2024-09-30 ENCOUNTER — TELEPHONE (OUTPATIENT)
Dept: FAMILY MEDICINE | Facility: CLINIC | Age: 34
End: 2024-09-30

## 2024-09-30 DIAGNOSIS — J45.40 MODERATE PERSISTENT ASTHMA WITHOUT COMPLICATION: ICD-10-CM

## 2024-09-30 RX ORDER — ALBUTEROL SULFATE 90 UG/1
1-2 AEROSOL, METERED RESPIRATORY (INHALATION) EVERY 4 HOURS PRN
Qty: 18 G | Refills: 1 | Status: SHIPPED | OUTPATIENT
Start: 2024-09-30

## 2024-09-30 RX ORDER — FLUTICASONE PROPIONATE AND SALMETEROL 50; 500 UG/1; UG/1
1 POWDER RESPIRATORY (INHALATION) EVERY 12 HOURS
Qty: 180 EACH | Refills: 0 | Status: SHIPPED | OUTPATIENT
Start: 2024-09-30

## 2024-09-30 NOTE — TELEPHONE ENCOUNTER
Pt calling       He is needing prescriptions sent to a different pharmacy due to construction.  HE requested the remaining refill for albuterol and advair.  He had concerns that he received the generic advair last time, should not have as it is [facility].    He needs to check his meds before leaving the pharmacy to ensure that have given him the correct prescription.        Mena, RN    Triage Nurse  [facility]

## 2024-10-17 NOTE — MR AVS SNAPSHOT
After Visit Summary   5/9/2018    Jada Lion    MRN: [id number]           Patient Information     Date Of Birth          1990        Visit Information        Provider Department      5/9/2018 5:20 PM Liliam Hernández MD [facility]        Today's Diagnoses     Moderate persistent asthma without complication    -  1    Tobacco use disorder          Care Instructions    At [facility], we strive to deliver an exceptional experience to you, every time we see you.  If you receive a survey in the mail, please send us back your thoughts. We really do value your feedback.    Based on your medical history, these are the current health maintenance/preventive care services that you are due for (some may have been done at this visit.)  Health Maintenance Due   Topic Date Due     HIV SCREEN (SYSTEM ASSIGNED)  07/21/2010       Suggested websites for health information:  Www.FreeMarkets : Up to date and easily searchable information on multiple topics.  Www.Riskonnect.gov : medication info, interactive tutorials, watch real surgeries online  Www.familydoctor.org : good info from the Academy of Family Physicians  Www.cdc.gov : public health info, travel advisories, epidemics (H1N1)  Www.aap.org : children's health info, normal development, vaccinations  Www.health.[facility].[place].us : [place] dept of health, public health issues in [place], N1N1    Your care team:                            Family Medicine Internal Medicine   MD Michael Ma MD Shantel Branch-Fleming, MD Katya Georgiev PA-C Megan Hill, RADHIKA Fischer MD Pediatrics   CHAVA Corado, MD Kira Roberson APRN CNP   MD Krista Lawrence MD Deborah Mielke, MD Kim Thein, APRN [facility]      Clinic hours: Monday - Thursday 7 am-7 pm; Fridays 7 am-5 pm.   Urgent care: Monday - Friday 11 am-9 pm; Saturday and Sunday 9 am-5 pm.  Pharmacy : Monday  Patient: America Estrada    Procedure: Procedure(s):  COLONOSCOPY, FLEXIBLE, WITH LESION REMOVAL USING SNARE       Anesthesia Type:  MAC    Note:  Anesthesia Post Evaluation    Last vitals:  Vitals Value Taken Time   BP 80/55 10/17/24 0950   Temp     Pulse 75 10/17/24 0950   Resp     SpO2 99 % 10/17/24 1010       Electronically Signed By: KATARINA Smith CRNA  October 17, 2024  10:19 AM   "-Thursday 8 am-8 pm; Friday 8 am-6 pm; Saturday and Sunday 9 am-5 pm.     Clinic: [phone]   Pharmacy: [phone]              Follow-ups after your visit        Follow-up notes from your care team     Return in about 4 weeks (around 6/6/2018) for ACT recheck .      Who to contact     If you have questions or need follow up information about today's clinic visit or your schedule please contact [facility] directly at [phone].  Normal or non-critical lab and imaging results will be communicated to you by Cubeaconhart, letter or phone within 4 business days after the clinic has received the results. If you do not hear from us within 7 days, please contact the clinic through Boontyt or phone. If you have a critical or abnormal lab result, we will notify you by phone as soon as possible.  Submit refill requests through [employer] or call your pharmacy and they will forward the refill request to us. Please allow 3 business days for your refill to be completed.          Additional Information About Your Visit        MyChart Information     [employer] gives you secure access to your electronic health record. If you see a primary care provider, you can also send messages to your care team and make appointments. If you have questions, please call your primary care clinic.  If you do not have a primary care provider, please call [phone] and they will assist you.        Care EveryWhere ID     This is your Care EveryWhere ID. This could be used by other organizations to access your [place] medical records  [id number]        Your Vitals Were     Pulse Temperature Height Pulse Oximetry BMI (Body Mass Index)       56 98  F (36.7  C) (Oral) 5' 5.75\" (1.67 m) 98% 27.97 kg/m2        Blood Pressure from Last 3 Encounters:   05/09/18 113/69   03/05/18 118/80   01/22/18 126/68    Weight from Last 3 Encounters:   05/09/18 172 lb (78 kg)   03/05/18 167 lb (75.8 kg)   01/22/18 172 lb (78 kg)            "   Today, you had the following     No orders found for display         Today's Medication Changes          These changes are accurate as of 5/9/18  6:53 PM.  If you have any questions, ask your nurse or doctor.               Stop taking these medicines if you haven't already. Please contact your care team if you have questions.     benzonatate 200 MG capsule   Commonly known as:  TESSALON   Stopped by:  Liliam Hernández MD           methocarbamol 750 MG tablet   Commonly known as:  ROBAXIN   Stopped by:  Liliam Hernández MD           oseltamivir 75 MG capsule   Commonly known as:  TAMIFLU   Stopped by:  Liliam Hernández MD                Where to get your medicines      These medications were sent to [id number] - [facility], [place] - [address]  [address] [facility] [place] [address]     Phone:  [phone]     albuterol (2.5 MG/3ML) 0.083% neb solution    albuterol 108 (90 Base) MCG/ACT Inhaler    fluticasone-salmeterol 500-50 MCG/DOSE diskus inhaler                Primary Care Provider Office Phone # Fax #    Ariana Cate Gonzalez PA-C [phone] [address] [place] [address]        Equal Access to Services     RACHEL NICOLE : Hadii bebeto casianoo Soomaali, waaxda luqadaha, qaybta kaalmada adeegyada, waxay lindain haydanyell rees. So [facility] [phone].    ATENCIÓN: Si habla español, tiene a antunez disposición servicios gratuitos de asistencia lingüística. Joey al [phone].    We comply with applicable federal civil rights laws and [place] laws. We do not discriminate on the basis of race, color, national origin, age, disability, sex, sexual orientation, or gender identity.            Thank you!     Thank you for choosing [facility]  for your care. Our goal is always to provide you with excellent care. Hearing back from our patients is one way we can continue to improve our services. Please take a few minutes to complete the  written survey that you may receive in the mail after your visit with us. Thank you!             Your Updated Medication List - Protect others around you: Learn how to safely use, store and throw away your medicines at www.disposemymeds.org.          This list is accurate as of 5/9/18  6:53 PM.  Always use your most recent med list.                   Brand Name Dispense Instructions for use Diagnosis    * albuterol (2.5 MG/3ML) 0.083% neb solution     150 mL    INHALE 1 VIAL VIA NEB EVERY 6 HOURS AS NEEDED FOR SHORTNESS OF BREATH/WHEEZING    Moderate persistent asthma without complication       * albuterol 108 (90 Base) MCG/ACT Inhaler    PROAIR HFA/PROVENTIL HFA/VENTOLIN HFA    2 Inhaler    Inhale 2 puffs into the lungs every 4 hours as needed for shortness of breath / dyspnea or wheezing    Moderate persistent asthma without complication       fluticasone-salmeterol 500-50 MCG/DOSE diskus inhaler    ADVAIR    1 Inhaler    Inhale 1 puff into the lungs 2 times daily    Moderate persistent asthma without complication       * Notice:  This list has 2 medication(s) that are the same as other medications prescribed for you. Read the directions carefully, and ask your doctor or other care provider to review them with you.

## 2024-12-26 DIAGNOSIS — J45.40 MODERATE PERSISTENT ASTHMA WITHOUT COMPLICATION: ICD-10-CM

## 2024-12-26 NOTE — TELEPHONE ENCOUNTER
Medication Question or Refill    Contacts       Contact Date/Time Type Contact Phone/Fax    12/26/2024 04:40 PM CST Phone (Incoming) Jada Lion (Self) [phone] (M)            What medication are you calling about (include dose and sig)?: Inhaler    Preferred Pharmacy:       [id number] [address]  AT [place] [address]  [address]  [place] [address]  Phone: [phone] Fax: [phone]      Controlled Substance Agreement on file:   CSA -- Patient Level:    CSA: None found at the patient level.       Who prescribed the medication?: Ariana Gonzalez    Do you need a refill? Yes    When did you use the medication last? This week    Patient offered an appointment? Yes: Scheduling once this is sent.    Do you have any questions or concerns?  Yes: Pt reports not feeling well and needs inhaler.      Could we send this information to you in [facility] or would you prefer to receive a phone call?:   No preference   Okay to leave a detailed message?: Yes at Home number on file [phone] (home)

## 2024-12-30 RX ORDER — ALBUTEROL SULFATE 90 UG/1
1-2 INHALANT RESPIRATORY (INHALATION) EVERY 4 HOURS PRN
Qty: 18 G | Refills: 1 | OUTPATIENT
Start: 2024-12-30